# Patient Record
Sex: MALE | Race: BLACK OR AFRICAN AMERICAN | HISPANIC OR LATINO | Employment: UNEMPLOYED | URBAN - METROPOLITAN AREA
[De-identification: names, ages, dates, MRNs, and addresses within clinical notes are randomized per-mention and may not be internally consistent; named-entity substitution may affect disease eponyms.]

---
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
## 2021-04-21 NOTE — ED NOTES
Pt asking for bedtime medication, placed in chart and ordered       Margie Adams, CATALINA  02/24/19 0055 No

## 2023-11-01 ENCOUNTER — HOSPITAL ENCOUNTER (EMERGENCY)
Facility: HOSPITAL | Age: 23
End: 2023-11-03
Attending: EMERGENCY MEDICINE
Payer: COMMERCIAL

## 2023-11-01 DIAGNOSIS — F29 PSYCHOSIS (HCC): ICD-10-CM

## 2023-11-01 DIAGNOSIS — F12.10 CANNABIS ABUSE: Primary | ICD-10-CM

## 2023-11-01 LAB
AMPHETAMINES SERPL QL SCN: NEGATIVE
BARBITURATES UR QL: NEGATIVE
BENZODIAZ UR QL: NEGATIVE
COCAINE UR QL: NEGATIVE
ETHANOL EXG-MCNC: 0 MG/DL
METHADONE UR QL: NEGATIVE
OPIATES UR QL SCN: NEGATIVE
OXYCODONE+OXYMORPHONE UR QL SCN: NEGATIVE
PCP UR QL: NEGATIVE
THC UR QL: POSITIVE

## 2023-11-01 PROCEDURE — 80307 DRUG TEST PRSMV CHEM ANLYZR: CPT | Performed by: PHYSICIAN ASSISTANT

## 2023-11-01 PROCEDURE — 99285 EMERGENCY DEPT VISIT HI MDM: CPT | Performed by: PHYSICIAN ASSISTANT

## 2023-11-01 PROCEDURE — 82075 ASSAY OF BREATH ETHANOL: CPT | Performed by: PHYSICIAN ASSISTANT

## 2023-11-01 PROCEDURE — 99285 EMERGENCY DEPT VISIT HI MDM: CPT

## 2023-11-01 PROCEDURE — 96372 THER/PROPH/DIAG INJ SC/IM: CPT

## 2023-11-01 RX ORDER — BENZTROPINE MESYLATE 1 MG/ML
2 INJECTION INTRAMUSCULAR; INTRAVENOUS ONCE
Status: COMPLETED | OUTPATIENT
Start: 2023-11-01 | End: 2023-11-01

## 2023-11-01 RX ORDER — LORAZEPAM 2 MG/ML
2 INJECTION INTRAMUSCULAR ONCE
Status: DISCONTINUED | OUTPATIENT
Start: 2023-11-01 | End: 2023-11-03 | Stop reason: HOSPADM

## 2023-11-01 RX ORDER — DIPHENHYDRAMINE HYDROCHLORIDE 50 MG/ML
50 INJECTION INTRAMUSCULAR; INTRAVENOUS ONCE
Status: COMPLETED | OUTPATIENT
Start: 2023-11-01 | End: 2023-11-01

## 2023-11-01 RX ORDER — WATER 10 ML/10ML
INJECTION INTRAMUSCULAR; INTRAVENOUS; SUBCUTANEOUS
Status: COMPLETED
Start: 2023-11-01 | End: 2023-11-01

## 2023-11-01 RX ORDER — ZIPRASIDONE MESYLATE 20 MG/ML
20 INJECTION, POWDER, LYOPHILIZED, FOR SOLUTION INTRAMUSCULAR ONCE
Status: COMPLETED | OUTPATIENT
Start: 2023-11-01 | End: 2023-11-01

## 2023-11-01 RX ORDER — HALOPERIDOL 5 MG/ML
5 INJECTION INTRAMUSCULAR ONCE
Status: DISCONTINUED | OUTPATIENT
Start: 2023-11-01 | End: 2023-11-01

## 2023-11-01 RX ORDER — LORAZEPAM 2 MG/ML
2 INJECTION INTRAMUSCULAR ONCE
Status: COMPLETED | OUTPATIENT
Start: 2023-11-01 | End: 2023-11-01

## 2023-11-01 RX ORDER — LORAZEPAM 2 MG/ML
2 INJECTION INTRAMUSCULAR EVERY 4 HOURS PRN
Status: DISCONTINUED | OUTPATIENT
Start: 2023-11-01 | End: 2023-11-03

## 2023-11-01 RX ORDER — LORAZEPAM 2 MG/ML
2 INJECTION INTRAMUSCULAR ONCE
Status: DISCONTINUED | OUTPATIENT
Start: 2023-11-01 | End: 2023-11-01

## 2023-11-01 RX ORDER — HALOPERIDOL 5 MG/ML
5 INJECTION INTRAMUSCULAR ONCE
Status: COMPLETED | OUTPATIENT
Start: 2023-11-01 | End: 2023-11-01

## 2023-11-01 RX ORDER — DIPHENHYDRAMINE HYDROCHLORIDE 50 MG/ML
50 INJECTION INTRAMUSCULAR; INTRAVENOUS ONCE
Status: DISCONTINUED | OUTPATIENT
Start: 2023-11-01 | End: 2023-11-01

## 2023-11-01 RX ADMIN — BENZTROPINE MESYLATE 2 MG: 1 INJECTION INTRAMUSCULAR; INTRAVENOUS at 23:05

## 2023-11-01 RX ADMIN — HALOPERIDOL LACTATE 5 MG: 5 INJECTION, SOLUTION INTRAMUSCULAR at 17:04

## 2023-11-01 RX ADMIN — ZIPRASIDONE MESYLATE 20 MG: 20 INJECTION, POWDER, LYOPHILIZED, FOR SOLUTION INTRAMUSCULAR at 23:05

## 2023-11-01 RX ADMIN — WATER 1.2 ML: 1 INJECTION INTRAMUSCULAR; INTRAVENOUS; SUBCUTANEOUS at 23:05

## 2023-11-01 RX ADMIN — LORAZEPAM 2 MG: 2 INJECTION INTRAMUSCULAR; INTRAVENOUS at 17:04

## 2023-11-01 RX ADMIN — WATER 1.2 ML: 1 INJECTION INTRAMUSCULAR; INTRAVENOUS; SUBCUTANEOUS at 21:06

## 2023-11-01 RX ADMIN — DIPHENHYDRAMINE HYDROCHLORIDE 50 MG: 50 INJECTION, SOLUTION INTRAMUSCULAR; INTRAVENOUS at 17:03

## 2023-11-01 RX ADMIN — ZIPRASIDONE MESYLATE 20 MG: 20 INJECTION, POWDER, LYOPHILIZED, FOR SOLUTION INTRAMUSCULAR at 21:06

## 2023-11-01 NOTE — ED NOTES
Pt moved to the [facility] area, pt stripped and placed in scrubs w/ assistance from police and Maci Campbell  11/01/23 6366
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
## 2023-11-01 NOTE — ED NOTES
Unable to obtain vitals at this time due to patient being unsafe to approach.       Buddy Smith RN  11/01/23 3561

## 2023-11-01 NOTE — LETTER
[address] [facility] [address]  Dept: [phone]      [id number] TRANSFER CONSENT    NAME Latanya Wagoner                    2000                              MRN [id number]    I have been informed of my rights regarding examination, treatment, and transfer   by Dr. Nereida Porter DO    Benefits: Specialized equipment and/or services available at the receiving facility (Include comment)________________________    Risks: Potential for delay in receiving treatment      Consent for Transfer:  I acknowledge that my medical condition has been evaluated and explained to me by the emergency department physician or other qualified medical person and/or my attending physician, who has recommended that I be transferred to the service of  Accepting Physician: Dr. Gini Murillo at [address] Name, [address] [facility] [address] : [address] [facility], [facility]. The above potential benefits of such transfer, the potential risks associated with such transfer, and the probable risks of not being transferred have been explained to me, and I fully understand them. The doctor has explained that, in my case, the benefits of transfer outweigh the risks. I agree to be transferred. I authorize the performance of emergency medical procedures and treatments upon me in both transit and upon arrival at the receiving facility. Additionally, I authorize the release of any and all medical records to the receiving facility and request they be transported with me, if possible. I understand that the safest mode of transportation during a medical emergency is an ambulance and that the Hospital advocates the use of this mode of transport. Risks of traveling to the receiving facility by car, including absence of medical control, life sustaining equipment, such as oxygen, and medical personnel has been explained to me and I fully understand them.     ([address] [facility] CORRECT BOX BELOW)  [X]  I consent to the stated transfer and to be transported by ambulance/helicopter. [  ]  I consent to the stated transfer, but refuse transportation by ambulance and accept full responsibility for my transportation by car. I understand the risks of non-ambulance transfers and I exonerate the Hospital and its staff from any deterioration in my condition that results from this refusal.    X___________________________________________    DATE  23  TIME________  Signature of patient or legally responsible individual signing on patient behalf           RELATIONSHIP TO PATIENT_________________________                  Provider Certification    NAME All Harper                                 2000                              MRN [id number]    A medical screening exam was performed on the above named patient. Based on the examination:    Condition Necessitating Transfer The primary encounter diagnosis was Cannabis abuse. A diagnosis of Psychosis ([address] [facility]) was also pertinent to this visit.     Patient Condition: The patient has been stabilized such that within reasonable medical probability, no material deterioration of the patient condition or the condition of the unborn child(harshad) is likely to result from the transfer    Reason for Transfer: Level of Care needed not available at this facility    Transfer Requirements: [address] [facility], [address] [facility] [address]   Space available and qualified personnel available for treatment as acknowledged by [place], [address] [facility], [phone]  Agreed to accept transfer and to provide appropriate medical treatment as acknowledged by       Dr. Gilmar Hdz  Appropriate medical records of the examination and treatment of the patient are provided at the time of transfer   [address] [facility] [address] [facility] [address] __TS__  Transfer will be performed by qualified personnel from [facility]  and appropriate transfer equipment as required, including the use of necessary and appropriate life support measures. Provider Certification: I have examined the patient and explained the following risks and benefits of being transferred/refusing transfer to the patient/family:         Based on these reasonable risks and benefits to the patient and/or the unborn child(harshad), and based upon the information available at the time of the patient’s examination, I certify that the medical benefits reasonably to be expected from the provision of appropriate medical treatments at another medical facility outweigh the increasing risks, if any, to the individual’s medical condition, and in the case of labor to the unborn child, from effecting the transfer.     X____________________________________________ DATE 11/03/23        TIME_______      ORIGINAL - SEND TO MEDICAL RECORDS   COPY - SEND WITH PATIENT DURING TRANSFER

## 2023-11-01 NOTE — ED NOTES
Pt uncooperative with vital signs, breathalyzer, triage questions     Elio Messina RN  11/01/23 [phone]

## 2023-11-01 NOTE — ED NOTES
Pt sitting up in restraints, pt speaking but not providing any comprehensible information.       Ru Eckert RN  11/01/23 4234

## 2023-11-01 NOTE — ED NOTES
Pt urinated on himself. Bed changed. Skin cleansed. Security at bedside.  Scrubs changed     Óscar Ventura RN  11/01/23 6226

## 2023-11-02 PROCEDURE — 96374 THER/PROPH/DIAG INJ IV PUSH: CPT

## 2023-11-02 RX ORDER — HYDROXYZINE HYDROCHLORIDE 25 MG/1
25 TABLET, FILM COATED ORAL
Status: CANCELLED | OUTPATIENT
Start: 2023-11-02

## 2023-11-02 RX ORDER — HALOPERIDOL 5 MG/1
5 TABLET ORAL
Status: CANCELLED | OUTPATIENT
Start: 2023-11-02

## 2023-11-02 RX ORDER — LORAZEPAM 2 MG/ML
1 INJECTION INTRAMUSCULAR EVERY 4 HOURS PRN
Status: CANCELLED | OUTPATIENT
Start: 2023-11-02

## 2023-11-02 RX ORDER — ZIPRASIDONE MESYLATE 20 MG/ML
20 INJECTION, POWDER, LYOPHILIZED, FOR SOLUTION INTRAMUSCULAR EVERY 4 HOURS PRN
Status: DISCONTINUED | OUTPATIENT
Start: 2023-11-02 | End: 2023-11-03

## 2023-11-02 RX ORDER — WATER 10 ML/10ML
INJECTION INTRAMUSCULAR; INTRAVENOUS; SUBCUTANEOUS
Status: DISPENSED
Start: 2023-11-02 | End: 2023-11-03

## 2023-11-02 RX ORDER — LORAZEPAM 1 MG/1
1 TABLET ORAL EVERY 8 HOURS PRN
Status: CANCELLED | OUTPATIENT
Start: 2023-11-02

## 2023-11-02 RX ORDER — HALOPERIDOL 5 MG/ML
2.5 INJECTION INTRAMUSCULAR
Status: CANCELLED | OUTPATIENT
Start: 2023-11-02

## 2023-11-02 RX ORDER — POLYETHYLENE GLYCOL 3350 17 G/17G
17 POWDER, FOR SOLUTION ORAL DAILY PRN
Status: CANCELLED | OUTPATIENT
Start: 2023-11-02

## 2023-11-02 RX ORDER — HALOPERIDOL 5 MG/ML
5 INJECTION INTRAMUSCULAR
Status: CANCELLED | OUTPATIENT
Start: 2023-11-02

## 2023-11-02 RX ORDER — ACETAMINOPHEN 325 MG/1
650 TABLET ORAL EVERY 6 HOURS PRN
Status: CANCELLED | OUTPATIENT
Start: 2023-11-02

## 2023-11-02 RX ORDER — BENZTROPINE MESYLATE 1 MG/ML
0.5 INJECTION INTRAMUSCULAR; INTRAVENOUS
Status: CANCELLED | OUTPATIENT
Start: 2023-11-02

## 2023-11-02 RX ORDER — LORAZEPAM 2 MG/ML
1 INJECTION INTRAMUSCULAR
Status: CANCELLED | OUTPATIENT
Start: 2023-11-02

## 2023-11-02 RX ORDER — LORAZEPAM 2 MG/ML
2 INJECTION INTRAMUSCULAR EVERY 8 HOURS PRN
Status: CANCELLED | OUTPATIENT
Start: 2023-11-02

## 2023-11-02 RX ORDER — BENZTROPINE MESYLATE 1 MG/ML
1 INJECTION INTRAMUSCULAR; INTRAVENOUS
Status: CANCELLED | OUTPATIENT
Start: 2023-11-02

## 2023-11-02 RX ORDER — BENZTROPINE MESYLATE 1 MG/ML
2 INJECTION INTRAMUSCULAR; INTRAVENOUS ONCE
Status: DISCONTINUED | OUTPATIENT
Start: 2023-11-02 | End: 2023-11-03 | Stop reason: HOSPADM

## 2023-11-02 RX ORDER — LORAZEPAM 2 MG/ML
2 INJECTION INTRAMUSCULAR
Status: CANCELLED | OUTPATIENT
Start: 2023-11-02

## 2023-11-02 RX ORDER — TRAZODONE HYDROCHLORIDE 100 MG/1
100 TABLET ORAL
Status: CANCELLED | OUTPATIENT
Start: 2023-11-02

## 2023-11-02 RX ORDER — ACETAMINOPHEN 325 MG/1
650 TABLET ORAL EVERY 4 HOURS PRN
Status: CANCELLED | OUTPATIENT
Start: 2023-11-02

## 2023-11-02 RX ORDER — MAGNESIUM HYDROXIDE/ALUMINUM HYDROXICE/SIMETHICONE 120; 1200; 1200 MG/30ML; MG/30ML; MG/30ML
30 SUSPENSION ORAL EVERY 4 HOURS PRN
Status: CANCELLED | OUTPATIENT
Start: 2023-11-02

## 2023-11-02 RX ORDER — ACETAMINOPHEN 325 MG/1
975 TABLET ORAL EVERY 6 HOURS PRN
Status: CANCELLED | OUTPATIENT
Start: 2023-11-02

## 2023-11-02 RX ORDER — HALOPERIDOL 1 MG/1
2 TABLET ORAL
Status: CANCELLED | OUTPATIENT
Start: 2023-11-02

## 2023-11-02 RX ORDER — BENZTROPINE MESYLATE 1 MG/1
1 TABLET ORAL EVERY 6 HOURS PRN
Status: CANCELLED | OUTPATIENT
Start: 2023-11-02

## 2023-11-02 RX ORDER — HYDROXYZINE HYDROCHLORIDE 25 MG/1
50 TABLET, FILM COATED ORAL
Status: CANCELLED | OUTPATIENT
Start: 2023-11-02

## 2023-11-02 RX ADMIN — LORAZEPAM 2 MG: 2 INJECTION INTRAMUSCULAR; INTRAVENOUS at 00:10
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
## 2023-11-02 NOTE — ED NOTES
CW received tc from pt's mother inquiring on update of pt. CW advised pt's mother, Vaibhav Culver, pt has not given this writer verbal consent to share details. CW did advise Vaibhav Culver, as Vaibhav Culver is the petitioner of the [phone], pt will be hospitalized. CW advised Huy Noble would advise pt, Vaibhav Culver called and if pt is interested in contacting her, a phone would be provided. CW confirmed w/Ele[employer], pt does not have active health insurance at this time. Vaibhav Culver, pt's mother, can be reached at: [phone]    The phone number on demographic sheet is an old number.     TDS, CW
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
## 2023-11-02 NOTE — ED NOTES
CW observes pt speaking w/another pt in secure holding area. Pt appears to be conversing and engaging appropriately.     TDS, CW
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
## 2023-11-02 NOTE — ED NOTES
Pt resting comfortably at this time, will continue to monitor.       Charisse Aaron RN  11/02/23 2003

## 2023-11-02 NOTE — RESTRAINT FACE TO FACE
Restraint Face to Face   Lyssa Oconnor 25 y.o. male MRN: [id number]  Unit/Bed#: [facility] 02 Encounter: [id number]      Physical Evaluation  Purpose for Restraints/ Seclusion High risk for self harm, High risk for causing significant disruption of treatment environment , High risk for harm to others, and high risk for flight  Patient's reaction to the intervention is as expected  Patient's medical condition stable  Patient's Behavioral conditionstable  Restraints to be Continued

## 2023-11-02 NOTE — ED NOTES
Medication Ativan 2mg and Geodon wasted with Cristhian Chao RN.       Patricia Meyers RN  11/02/23 7908

## 2023-11-02 NOTE — ED NOTES
Pt is observed making verbal threats to staff. Pt is refusing to obtain vitals.       Shabana Gibson RN  11/01/23 8238

## 2023-11-02 NOTE — ED NOTES
Left arm, right leg restraints removed with security at bedside. Primary RN notified   Pt allowed EDT ML to obtain vitals.  pt behavior calm and cooperative, security at bedside   Pt currently eating sandwich calm and cooperative      Viktoriya Carrera, [address] [place] [address]  11/02/23 0251

## 2023-11-03 ENCOUNTER — HOSPITAL ENCOUNTER (INPATIENT)
Facility: HOSPITAL | Age: 23
LOS: 25 days | Discharge: HOME | DRG: 753 | End: 2023-11-28
Attending: PSYCHIATRY & NEUROLOGY | Admitting: PSYCHIATRY & NEUROLOGY
Payer: COMMERCIAL

## 2023-11-03 VITALS
HEART RATE: 91 BPM | OXYGEN SATURATION: 100 % | TEMPERATURE: 98.3 F | RESPIRATION RATE: 18 BRPM | SYSTOLIC BLOOD PRESSURE: 172 MMHG | DIASTOLIC BLOOD PRESSURE: 82 MMHG

## 2023-11-03 DIAGNOSIS — M54.50 LUMBAGO: ICD-10-CM

## 2023-11-03 DIAGNOSIS — E55.9 VITAMIN D DEFICIENCY: ICD-10-CM

## 2023-11-03 DIAGNOSIS — Z72.0 TOBACCO ABUSE: ICD-10-CM

## 2023-11-03 DIAGNOSIS — E53.8 VITAMIN B12 DEFICIENCY: ICD-10-CM

## 2023-11-03 DIAGNOSIS — K64.9 HEMORRHOIDS: ICD-10-CM

## 2023-11-03 DIAGNOSIS — F39 MOOD DISORDER (HCC): Primary | ICD-10-CM

## 2023-11-03 DIAGNOSIS — F84.0 AUTISM SPECTRUM DISORDER: Chronic | ICD-10-CM

## 2023-11-03 DIAGNOSIS — F12.10 CANNABIS ABUSE: ICD-10-CM

## 2023-11-03 PROCEDURE — 99245 OFF/OP CONSLTJ NEW/EST HI 55: CPT | Performed by: PSYCHIATRY & NEUROLOGY

## 2023-11-03 PROCEDURE — 99223 1ST HOSP IP/OBS HIGH 75: CPT | Performed by: PSYCHIATRY & NEUROLOGY

## 2023-11-03 RX ORDER — BENZTROPINE MESYLATE 1 MG/1
1 TABLET ORAL EVERY 6 HOURS PRN
Status: DISCONTINUED | OUTPATIENT
Start: 2023-11-03 | End: 2023-11-03 | Stop reason: HOSPADM

## 2023-11-03 RX ORDER — HYDROXYZINE HYDROCHLORIDE 25 MG/1
25 TABLET, FILM COATED ORAL
Status: DISCONTINUED | OUTPATIENT
Start: 2023-11-03 | End: 2023-11-28 | Stop reason: HOSPADM

## 2023-11-03 RX ORDER — HALOPERIDOL 5 MG/ML
2.5 INJECTION INTRAMUSCULAR
Status: DISCONTINUED | OUTPATIENT
Start: 2023-11-03 | End: 2023-11-28 | Stop reason: HOSPADM

## 2023-11-03 RX ORDER — HYDROXYZINE 50 MG/1
50 TABLET, FILM COATED ORAL
Status: DISCONTINUED | OUTPATIENT
Start: 2023-11-03 | End: 2023-11-28 | Stop reason: HOSPADM

## 2023-11-03 RX ORDER — MAGNESIUM HYDROXIDE/ALUMINUM HYDROXICE/SIMETHICONE 120; 1200; 1200 MG/30ML; MG/30ML; MG/30ML
30 SUSPENSION ORAL EVERY 4 HOURS PRN
Status: DISCONTINUED | OUTPATIENT
Start: 2023-11-03 | End: 2023-11-28 | Stop reason: HOSPADM

## 2023-11-03 RX ORDER — CHLORPROMAZINE HYDROCHLORIDE 25 MG/ML
50 INJECTION INTRAMUSCULAR EVERY 6 HOURS PRN
Status: DISCONTINUED | OUTPATIENT
Start: 2023-11-03 | End: 2023-11-03 | Stop reason: HOSPADM

## 2023-11-03 RX ORDER — HALOPERIDOL 5 MG/1
5 TABLET ORAL
Status: DISCONTINUED | OUTPATIENT
Start: 2023-11-03 | End: 2023-11-28 | Stop reason: HOSPADM

## 2023-11-03 RX ORDER — OLANZAPINE 20 MG/1
20 TABLET ORAL DAILY
COMMUNITY
End: 2023-11-28

## 2023-11-03 RX ORDER — CHLORPROMAZINE HYDROCHLORIDE 25 MG/1
50 TABLET, FILM COATED ORAL EVERY 6 HOURS PRN
Status: DISCONTINUED | OUTPATIENT
Start: 2023-11-03 | End: 2023-11-03 | Stop reason: HOSPADM

## 2023-11-03 RX ORDER — LORAZEPAM 1 MG/1
1 TABLET ORAL EVERY 8 HOURS PRN
Status: DISCONTINUED | OUTPATIENT
Start: 2023-11-03 | End: 2023-11-28 | Stop reason: HOSPADM

## 2023-11-03 RX ORDER — LORAZEPAM 2 MG/ML
2 INJECTION INTRAMUSCULAR EVERY 8 HOURS PRN
Status: DISCONTINUED | OUTPATIENT
Start: 2023-11-03 | End: 2023-11-28 | Stop reason: HOSPADM

## 2023-11-03 RX ORDER — LORAZEPAM 2 MG/ML
2 INJECTION INTRAMUSCULAR EVERY 6 HOURS PRN
Status: DISCONTINUED | OUTPATIENT
Start: 2023-11-03 | End: 2023-11-03 | Stop reason: HOSPADM

## 2023-11-03 RX ORDER — OLANZAPINE 5 MG/1
5 TABLET ORAL
COMMUNITY
End: 2023-11-28

## 2023-11-03 RX ORDER — BENZTROPINE MESYLATE 1 MG/1
1 TABLET ORAL EVERY 6 HOURS PRN
Status: DISCONTINUED | OUTPATIENT
Start: 2023-11-03 | End: 2023-11-28 | Stop reason: HOSPADM

## 2023-11-03 RX ORDER — HALOPERIDOL 5 MG/ML
5 INJECTION INTRAMUSCULAR
Status: DISCONTINUED | OUTPATIENT
Start: 2023-11-03 | End: 2023-11-28 | Stop reason: HOSPADM

## 2023-11-03 RX ORDER — LORAZEPAM 2 MG/ML
1 INJECTION INTRAMUSCULAR EVERY 4 HOURS PRN
Status: DISCONTINUED | OUTPATIENT
Start: 2023-11-03 | End: 2023-11-28 | Stop reason: HOSPADM

## 2023-11-03 RX ORDER — BENZTROPINE MESYLATE 1 MG/ML
1 INJECTION INTRAMUSCULAR; INTRAVENOUS EVERY 6 HOURS PRN
Status: DISCONTINUED | OUTPATIENT
Start: 2023-11-03 | End: 2023-11-03 | Stop reason: HOSPADM

## 2023-11-03 RX ORDER — LORAZEPAM 2 MG/ML
2 INJECTION INTRAMUSCULAR
Status: DISCONTINUED | OUTPATIENT
Start: 2023-11-03 | End: 2023-11-28 | Stop reason: HOSPADM

## 2023-11-03 RX ORDER — BENZTROPINE MESYLATE 1 MG/ML
1 INJECTION INTRAMUSCULAR; INTRAVENOUS
Status: DISCONTINUED | OUTPATIENT
Start: 2023-11-03 | End: 2023-11-28 | Stop reason: HOSPADM

## 2023-11-03 RX ORDER — HALOPERIDOL 2 MG/1
2 TABLET ORAL
Status: DISCONTINUED | OUTPATIENT
Start: 2023-11-03 | End: 2023-11-28 | Stop reason: HOSPADM

## 2023-11-03 RX ORDER — LITHIUM CARBONATE 600 MG/1
1200 CAPSULE ORAL 3 TIMES DAILY
COMMUNITY
End: 2023-11-28

## 2023-11-03 RX ORDER — CHLORPROMAZINE HYDROCHLORIDE 25 MG/1
100 TABLET, FILM COATED ORAL EVERY 6 HOURS PRN
Status: DISCONTINUED | OUTPATIENT
Start: 2023-11-03 | End: 2023-11-03 | Stop reason: HOSPADM

## 2023-11-03 RX ORDER — LORAZEPAM 2 MG/ML
1 INJECTION INTRAMUSCULAR
Status: DISCONTINUED | OUTPATIENT
Start: 2023-11-03 | End: 2023-11-28 | Stop reason: HOSPADM

## 2023-11-03 RX ORDER — LORAZEPAM 1 MG/1
1 TABLET ORAL EVERY 6 HOURS PRN
Status: DISCONTINUED | OUTPATIENT
Start: 2023-11-03 | End: 2023-11-03 | Stop reason: HOSPADM

## 2023-11-03 RX ORDER — BENZTROPINE MESYLATE 1 MG/ML
0.5 INJECTION INTRAMUSCULAR; INTRAVENOUS
Status: DISCONTINUED | OUTPATIENT
Start: 2023-11-03 | End: 2023-11-28 | Stop reason: HOSPADM

## 2023-11-03 RX ORDER — TRAZODONE HYDROCHLORIDE 100 MG/1
100 TABLET ORAL
Status: DISCONTINUED | OUTPATIENT
Start: 2023-11-03 | End: 2023-11-09

## 2023-11-03 RX ORDER — ACETAMINOPHEN 325 MG/1
975 TABLET ORAL EVERY 6 HOURS PRN
Status: DISCONTINUED | OUTPATIENT
Start: 2023-11-03 | End: 2023-11-28 | Stop reason: HOSPADM

## 2023-11-03 RX ORDER — ACETAMINOPHEN 325 MG/1
650 TABLET ORAL EVERY 4 HOURS PRN
Status: DISCONTINUED | OUTPATIENT
Start: 2023-11-03 | End: 2023-11-28 | Stop reason: HOSPADM

## 2023-11-03 RX ORDER — ACETAMINOPHEN 325 MG/1
650 TABLET ORAL EVERY 6 HOURS PRN
Status: DISCONTINUED | OUTPATIENT
Start: 2023-11-03 | End: 2023-11-28 | Stop reason: HOSPADM

## 2023-11-03 RX ORDER — POLYETHYLENE GLYCOL 3350 17 G/17G
17 POWDER, FOR SOLUTION ORAL DAILY PRN
Status: DISCONTINUED | OUTPATIENT
Start: 2023-11-03 | End: 2023-11-28 | Stop reason: HOSPADM

## 2023-11-03 NOTE — CONSULTS
REQUIRED DOCUMENTATION:     1. This service was provided via Telemedicine. 2. Provider located at [facility]. 3. TeleMed provider: Beatris Friedlander Holter, DO.  4. Identify all parties in room with patient during tele consult:  Patient, case management/crisis  5. Patient was then informed that this was a Telemedicine visit and that the exam was being conducted confidentially over secure lines. My office door was closed. No one else was in the room. Patient acknowledged consent and understanding of privacy and security of the Telemedicine visit, and gave us permission to have the assistant stay in the room in order to assist with the history and to conduct the exam.  I informed the patient that I have reviewed their record in Epic and presented the opportunity for them to ask any questions regarding the visit today. The patient agreed to participate. Psychiatric Evaluation - Department of [address] 25 y.o. male MRN: [id number]  Unit/Bed#: [facility] 02 Encounter: [id number]    ASSESSMENT & PLAN     Suicide/Homicide Risk Assessment:  Risk of Harm to Self:  The following ratings are based on assessment at the time of the interview and review of records  Demographic risk factors include: lowest socioeconomic class, never , male, age: young adult (15-24)  Historical Risk Factors include: chronic psychiatric problems, chronic mood disorder, history of mood disorder, chronic psychotic symptoms, history of psychosis, history of cognitive impairment, drug use, substance use, history of substance use, history of violence, criminal behaviors  Recent Specific Risk Factors include: mental illness diagnosis, unstable mood, substance abuse, unemployed, agitation, aggression  Protective Factors: no current suicidal ideation, stable living environment  Weapons:  admits to presence of weapons .  The following steps have been taken to ensure weapons are properly secured: Case Manager will contact family to remove or secure weapons  Based on today's assessment, Tayla Leon presents the following risk of harm to self:  elevated secondary to psychotic state    Risk of Harm to Others: The following ratings are based on assessment at the time of the interview and review of records  Demographic risk factors include: lowest socioeconomic class, never , male, age: young adult (15-24)  Historical Risk Factors include: chronic psychiatric problems, chronic mood disorder, history of mood disorder, chronic psychotic symptoms, history of psychosis, history of cognitive impairment, drug use, substance use, history of substance use, history of violence, criminal behaviors  Recent Specific Risk Factors include: mental illness diagnosis, unstable mood, substance abuse, unemployed, agitation, aggression, homicidal ideation  Protective Factors: stable living environment  Weapons: admits to presence of weapons. The following steps have been taken to ensure weapons are properly secured:  will contact family to remove or secure weapons  Based on today's assessment, Tayla Leon presents the following risk of harm to others: elevated secondary to psychotic state    DSM-5 Diagnoses:   Psychosis, unspecified; consideration for bipolar affective disorder, current episode manic with psychotic features versus schizoaffective disorder, bipolar type versus less likely substance-induced psychosis  Autism spectrum disorder per record review. Cannabis use disorder    Treatment Recommendations/Precautions:  Continue medical management per primary team. Consider assess CK level, seeing as patient was previously restrained physically. Collaborate with collaterals for baseline assessment and disposition as necessary. Continue one-to-one observation to assure patient and peer safety in addition to possible elopement risk  Defer introduction of psychotropic medications per patient preference. Introduce p.r.n. medications for agitation/aggression; see below. Presently, patient adheres to criteria for inpatient behavioral health admission by means of involuntary 302 commitment. 303 pending. Patient is not able to leave [address] [place] [address], pending inpatient behavioral health placement. Discharge date per primary team.   Consultation appreciated. Psychiatry to follow as necessary. Please do not hesitate to call/contact our service with any additional comments/concerns. Please call/contact on-call Psychiatry via [address] including on-call psychiatric service via [address] [place] [address] ([phone]) with any comments/concerns if after hours/Fri/Sat/Sunday. Thank you! Medications Risks/Benefits:    Risks, benefits, and possible side effects of medications explained to Rodríguez} and he verbalizes understanding. At this time Ernesto Selby does not want to start medications. Physician Requesting Consult: Marnee Sandifer, DO  Reason for Consult / Principal Problem: psychosis    HISTORY OF PRESENT ILLNESS (HPI)     Shante Serra is a 25 y.o., overtly appearing , single male, possessing pertinent psychiatric history of bipolar affective disorder versus schizoaffective disorder bipolar type in addition to autism spectrum disorder (documented as Asperger's disorder) and previous oppositional defiant disorder, presenting to [address] [place] [address] emergency department accompanied by ambulance, subsequent to instances of agitation/aggression requiring physical and chemical restraint in addition to worsening signs/symptoms of psychosis including homicidal ideation that includes particular plan to "slash her throat", referring to his mother, in addition to plan pertaining to [facility] his mother with a hammer in the setting of medication noncompliance for approximately 1-2 months preceding present admission.  Per record review: Pt placed in locked 4 point restraints due to aggressive behavior towards staff. Pt verbalizing death threats and threats of violence towards staff. Pt witnessed punching the wall and air. Pt witnessed making gestures of guns with his hands and "shooting" the staff. Pt witnessed using his hands to break a person's neck. Security to bedside to restrain patient. Pt witnessed saying to PCA delivering patient's food "I will knock you the fuck out" advancing upon PCA w/ his fists clenched and arms poised to punch PCA. Consequentially, transfer to inpatient behavioral health was cancelled. Presently, adamantly denies suicidal/homicidal ideation in addition to thoughts of self-injury, denying any/all psychiatric complaints/concerns despite stating that he has a lot of "hatred", appearing increasingly angry/irritable, agitated, labile, reiterating that he "doesn't need to be in this hellhole." Additionally, patient denies any/all psychiatric complaints/concerns, appearing suspicious and paranoid throughout evaluation, minimizing incident preceding present admission, stating that he is "good smoking weed and hanging out at home", contending that if staff attempts to intervene psychiatrically, he will "rain hell storm down on everyone."     PSYCHIATRIC REVIEW OF SYSTEMS     Appetite: no  Adverse eating: no  Weight changes: no  Insomnia/sleeplessness: no  Fatigue/anergy: no  Anhedonia/lack of interest: no  Attention/concentration: no  Psychomotor agitation/retardation: no  Somatic symptoms: no  Anxiety/panic attack: no  Mikki/hypomania: no  Hopelessness/helplessness/worthlessness: no  Self-injurious behavior/high-risk behavior: yes  Suicidal ideation: no  Homicidal ideation: yes, towards "everyone"  Auditory hallucinations: no  Visual hallucinations: no  Other perceptual disturbances: no  Delusional thinking: no  Obsessive/compulsive symptoms: no    REVIEW OF SYSTEMS   Pertinent items are noted in HPI.     HISTORICAL INFORMATION     Past Psychiatric History:   Past Psychiatric management: admits to previous inpatient  behavioral health admission approximately 3-4 years ago in [place], [place], denying present outpatient psychiatric provider  Past Suicide attempts/self-injurious behavior: denies  Past Violent behavior: yes, see above  Past Psychiatric medications: yes, although is unwilling to elaborate    Substance Abuse History:  I spent time with patient in counseling and education on risk of substance abuse. Assessed him motivation and encouraged patient for treatment. Brief intervention done. Social History       Tobacco History       Smoking Status  Never      Smokeless Tobacco Use  Never              Alcohol History       Alcohol Use Status  Never Comment  pt reports being a social drinker              Drug Use       Drug Use Status  Yes Types  Marijuana Comment  Pt reports daily use              Sexual Activity       Sexually Active  Not Asked              Activities of Daily Living    Not Asked                   I have assessed this patient for substance use within the past 12 months    Family Psychiatric History:   Denies    Social History:  Education: 12th grade  Learning Disabilities:  previous diagnosis of autism spectrum disorder  Marital history: single  Living arrangement, social support:  resides with two "friends". Occupational History: unemployed  Functioning Relationships: good support system.   Other Pertinent History: admits to previous charges pertaining to domestic violence, denying probation/parole, alluding to access to     Traumatic History:   Abuse: Denies  Other Traumatic Events: Denies    OBJECTIVE     Past Medical History:   Diagnosis Date    ADHD     Anxiety     Asperger's disorder     Depression     Oppositional defiant disorder     Schizoaffective disorder ([address])      Meds/Allergies   all current active meds have been reviewed, current meds:   Current Facility-Administered Medications   Medication Dose Route Frequency    benztropine (COGENTIN) injection 1 mg  1 mg Intramuscular Q6H PRN    benztropine (COGENTIN) injection 2 mg  2 mg Intramuscular Once    benztropine (COGENTIN) tablet 1 mg  1 mg Oral Q6H PRN    chlorproMAZINE (THORAZINE) tablet 100 mg  100 mg Oral Q6H PRN    Or    chlorproMAZINE (THORAZINE) injection SOLN 50 mg  50 mg Intramuscular Q6H PRN    chlorproMAZINE (THORAZINE) tablet 50 mg  50 mg Oral Q6H PRN    LORazepam (ATIVAN) injection 2 mg  2 mg Intravenous Once    LORazepam (ATIVAN) injection 2 mg  2 mg Intramuscular Q6H PRN    LORazepam (ATIVAN) tablet 1 mg  1 mg Oral Q6H PRN   , and PTA meds:   Prior to Admission Medications   Prescriptions Last Dose Informant Patient Reported? Taking? Benzoyl Peroxide 10 % LIQD   Yes No   Sig: Wash affected areas   amphetamine-dextroamphetamine (ADDERALL XR) 20 MG 24 hr capsule   Yes No   Sig: Take 20 mg by mouth   mirtazapine (REMERON) 15 mg tablet   Yes No   Sig: Take 20 mg by mouth daily at bedtime      Facility-Administered Medications: None     No Known Allergies    Vital signs in last 24 hours:  HR:  [91] 91  BP: (172)/(82) 172/82  No intake or output data in the 24 hours ending 11/03/23 1123    Screenings:  Nutrition Screening: Nutrition Assessment (completed by Staff): Nutrition  Appetite: Poor (Pt reports appetite varies)    Pain Screening: Pain Screening:      Suicide Screening: ED Crisis Suicide Risk Assessment: Suicide Risk Assessment  Violence Risk to Self: Denies ideation within past 6 months  Protective Factors: The patient has no thoughts of suicide      Laboratory results:  I have personally reviewed all pertinent laboratory/tests results.   Most Recent Labs:   Lab Results   Component Value Date    HGBA1C 5.1 07/28/2022     07/28/2022     Labs in last 72 hours: No results for input(s): "WBC", "RBC", "HGB", "HCT", "PLT", "RDW", "TOTANEUTABS", "NEUTROABS", "SODIUM", "K", "CL", "CO2", "BUN", "CREATININE", "GLUC", "GLUF", "CALCIUM", "AST", "ALT", "ALKPHOS", "TP", "ALB", "TBILI", "CHOLESTEROL", "HDL", "TRIG", "[facility]", "VALPROICTOT", "CARBAMAZEPIN", "LITHIUM", "AMMONIA", "[id number]", "Vivek Maury", "T3FREE", "PREGTESTUR", "PREGSERUM", "HCG", "HCGQUANT", "RPR" in the last 72 hours.   Admission Labs:   Admission on 11/01/2023   Component Date Value    Amph/Meth UR 11/01/2023 Negative     Barbiturate Ur 11/01/2023 Negative     Benzodiazepine Urine 11/01/2023 Negative     Cocaine Urine 11/01/2023 Negative     Methadone Urine 11/01/2023 Negative     Opiate Urine 11/01/2023 Negative     PCP Ur 11/01/2023 Negative     THC Urine 11/01/2023 Positive (A)     Oxycodone Urine 11/01/2023 Negative     EXTBreath Alcohol 11/01/2023 0.00        Imaging Studies: see pertinent studies if applicable    EKG, Pathology, and Other Studies: see pertinent studies if applicable    Mental Status Evaluation:  Appearance:  age appropriate, casually dressed, disheveled, and possessing marginal grooming/hygiene in hospital attire   Behavior:  psychomotor agitation and restless and fidgety, argumentative, agitated possessing poor eye contact, suspicious, superficial   Speech:  loud and profane   Mood:  Dysphoric; "Get me the fuck out of this hellhole."    Affect:  increased in intensity, labile, and mood-congruent   Language: naming objects and repeating phrases   Thought Process:  tangential   Thought Content:  No overt obsessions or delusions although appears paranoid   Perceptual Disturbances: None although appears internally preoccupied and distracted at times   Risk Potential: Suicidal Ideations none, Homicidal Ideations with plan as above, and Potential for Aggression Yes previous instances of agitation/aggression   Sensorium:  person, place, month of year, and year   Cognition:  recent and remote memory grossly intact   Consciousness:  alert and awake    Attention: attention span appeared shorter than expected for age   Intellect: not examined   Fund of Knowledge: vocabulary: appears appropriate   Insight:  poor   Judgment: poor Muscle Strength and Tone: Not assessed   Gait/Station: Appears appropriate   Motor Activity: no abnormal movements     Memory: Short and long term memory  fair     Code Status: No Order    Advance Directive and Living Will:        Power of :      POLST:      Note Share: This note was not shared with the patient due to reasonable likelihood of causing patient harm    This note has been constructed using a voice recognition system. There may be translation, syntax,  or grammatical errors. If you have any questions, please contact the dictating provider. Georgia C. Holter, D.O.

## 2023-11-03 NOTE — ED NOTES
Explained to patient would like to trial taking 1 wrist & 1 ankle out of restraint. Patient verbalized understanding. Security at the bedside at this time to release right wrist & left ankle. At this time patient verbalizes understanding to this RN & security of expectations when released from restraints. Patient agreeable to trial period with expectations of being cooperative & non violent towards staff. Provider made aware for new restraint order.         Isael Persaud RN  11/03/23 5224

## 2023-11-03 NOTE — ED NOTES
CW received TT from [facility]    Pt transferring to [facility]   Under the care of Dr. Juan Alberto Grimaldo    Pt may arrive at 1400 or later    CW placed request in [facility],

## 2023-11-03 NOTE — ED NOTES
CW sent email and TT to psychiatrist requesting consult and advising of the need to schedule 303 hearing.      TDS, CW

## 2023-11-03 NOTE — PROGRESS NOTES
Clothing at Bedside:    [address] [facility] with [facility]  1 [place] Sock  1 Black Sock    Patient Belonging Room #20:    [place] Far er War Jacket  [place] Far er Head Lace Band Long  [place] with Silver Chain  Swamp Boots w/laces

## 2023-11-03 NOTE — ED NOTES
Explained to patient will have security remove remaining 2 restraints at this time. Patient agreeable & cooperative. Security aware. Charge RN also aware.       Александр Flores RN  11/03/23 0962

## 2023-11-03 NOTE — ED NOTES
CW received TT from [facility]    Pt has been accepted to [facility]  Pt may arrive anytime after 2200    CW placed transport request in [facility],

## 2023-11-03 NOTE — ED NOTES
CW provided pt w/updates and advised of transfer. Pt states, "I really don't care I don't want to be here anymore and I hope they shoot you." CW asked pt if pt feels anxious and needs any meds to relax.  Pt denied and stated, "Nope I don't want nothing and I really don't care anymore about anything."     TDS, CW

## 2023-11-03 NOTE — ED NOTES
CW received TT from [facility]    Pt will be picked up at [address] [place] [address]

## 2023-11-03 NOTE — ED NOTES
Patient ambulatory to bathroom at this time. Bed sheet changed. Patient given new pair of clean paper scrub pants. Patient cooperative with staff at this time.       Samanta Cline RN  11/03/23 4265

## 2023-11-03 NOTE — ED NOTES
Transport cancelled due to pt becoming aggressive and threatening, and is currently in 4-point locked restraints. Pt to be evaluated for in-pt Tx again tomorrow when out of restraints.     Armin Longoria, [address], CIS ll  11/02/23 22:01

## 2023-11-03 NOTE — H&P
Psychiatric Evaluation - Behavioral Health   Identification Data:Rodríguez Adamson 25 y.o. male MRN: [id number]  Unit/Bed#: U 222-01 Encounter: [id number]    Assessment/Plan   Principal Problem:    Mood disorder ([address]) r/o Bipolar D/o  Active Problems:    Autism spectrum disorder    Plan:   Start Abilify 5 mg daily for mood stabilization. Titrate as necessary. .   Admission labs. Frequent safety checks and vitals per unit protocol. Collaborate with family for baseline assessment and disposition planning. Case discussed with treatment team.  Treatment options and alternatives were reviewed with the patient.        --------------------------------------------------------------------------------------------------    Chief Complaint:  "I don't know why I'm here."    History of Present Illness     Nimesh Haney is a 25 y.o. male with a history of depression, bipolar disorder versus schizoaffective disorder, anxiety, ADHD, Autistic disorder, and ODD  who was admitted to the inpatient psychiatric unit on a involuntary 302 commitment basis due to unstable mood, increased agitation, aggressive behavior, and homicidal threats towards family members . Patient was BIB by police initially due to making homicidal threats of wanting to slash his mother's throat, was agitated and threatening staff in the ED, thus requiring physical restraints and IM medications. Symptoms prior to admission included homicidal ideation, increased irritability, anger outbursts, difficulty controlling anger, agitation, and noncompliance with medications. Onset of symptoms was gradual starting 1 month ago with gradually worsening course since that time. Stressors preceding admission included family conflict, family issues, financial problems, everyday stressors, difficulty with anger management, and noncompliance with treatment.  Estela Ying reports vague history of manic-like symptoms inlcuding goal directed behavior, decreased need for sleep, grandiosity, and increased rate of speech in the past, though unsure if occurred explicitly in the setting of substance use. Denies hx of perceptual disturbances such as auditory or visual hallucinations. On initial evaluation after admission to the inpatient psychiatric unit Katie Gunter was irritable, did not wish to engage with this writer, and refused to leave the group room in order to go through psychiatric evaluation. Patient requested that this writer sit next to him in the group room. During the course of interaction, patient initially stated that "everything is fine, none of the things that were said about me are true."  When talked about the comments he has made in the emergency department and per 302, he states that he felt angry with his family numbers, particularly his brother, who has been getting to conflict with him" triggering" him, to the point where he has gotten agitated and angry. He says that at that time he did make threats of wanting to cut his brother's throat, but not his mother's. In the emergency department, he states that he felt cornered, and states that he gets easily overwhelmed/overstimulated, and "lost it."  At this time, he denies having the same feelings as he did prior to admission, and regrets saying those things, though acknowledges that he has been getting increasingly into conflict with his family members and wanting to move out for some time, but not having the means to do so. Mood wise, he reports episodes of depression and anxiety, but says that he "takes care of it". He states that he has been in and out of different psychiatric hospitals since the age of 12, and notes having tried multiple medications without efficacy. He notes that he stopped taking lithium and Zyprexa several months ago as he notes that he felt "like a zombie" and feels like all the medications that he has tried stripped him of his "identity".   He says that he is interested in trying "something ", but says that he prefers to use marijuana as it is "natural "and helps calm him down. During the conversation, patient broke down crying on several occasions stating that he does not know who he is anymore after "going through all this."  He notes that he has difficulty with social interactions, particularly with social cues, as well as sensitivity to different sensory inputs, such as sounds and lights.    --------------------------------------------------------------------------------------------------  Per Psychiatrist Consultant Dr. Celine Yang Evaluation:  "Adrien Bennett is a 25 y.o., overtly appearing , single male, possessing pertinent psychiatric history of bipolar affective disorder versus schizoaffective disorder bipolar type in addition to autism spectrum disorder (documented as Asperger's disorder) and previous oppositional defiant disorder, presenting to [address] [place] [address] emergency department accompanied by ambulance, subsequent to instances of agitation/aggression requiring physical and chemical restraint in addition to worsening signs/symptoms of psychosis including homicidal ideation that includes particular plan to "slash her throat", referring to his mother, in addition to plan pertaining to [address] [place] [address] his mother with a hammer in the setting of medication noncompliance for approximately 1-2 months preceding present admission. Per record review: Pt placed in locked 4 point restraints due to aggressive behavior towards staff. Pt verbalizing death threats and threats of violence towards staff. Pt witnessed punching the wall and air. Pt witnessed making gestures of guns with his hands and "shooting" the staff. Pt witnessed using his hands to break a person's neck. Security to bedside to restrain patient.  Pt witnessed saying to PCA delivering patient's food "I will knock you the fuck out" advancing upon PCA w/ his fists clenched and arms poised to punch PCA. Consequentially, transfer to inpatient behavioral health was cancelled. Presently, adamantly denies suicidal/homicidal ideation in addition to thoughts of self-injury, denying any/all psychiatric complaints/concerns despite stating that he has a lot of "hatred", appearing increasingly angry/irritable, agitated, labile, reiterating that he "doesn't need to be in this hellhole." Additionally, patient denies any/all psychiatric complaints/concerns, appearing suspicious and paranoid throughout evaluation, minimizing incident preceding present admission, stating that he is "good smoking weed and hanging out at home", contending that if staff attempts to intervene psychiatrically, he will "rain hell storm down on everyone." "    Per Crisis Worker:  "Pt presents to the ED from home accompanied by EMS. 36 petitioned by pt's mother alleging, "Rodríguez's mom, Alisha Mccoy, provided that he threatened to slash her throat, shoot the family, threatened with a hammer, and leave the family for dead. She provided he is non-compliant with medication for 1-2 months. She provided he has schizoaffective disorder and bipolar 1. When I asked if he wanted to speak to a doctor, he re-enacted stabbing them and stomping them. Then he stated that's what he's going to do. In the hospital he wanted to kill a nurse." CW reviewed w/pt events which took place and 302. Pt denies all allegations. Pt denies any threats made to medical staff, self, and or family. Pt is calm, cooperative, and engages in conversation w/this writer. Pt reports not having any OP services and states, "I don't like therapists and don't need to talk to them. I smoke weed which gets my mind level and it's better for me it comes from the earth. I don't need pills or a doctor to tell me what I need." Pt maintains fair eye contact. Pt does report becoming agitated at home w/brother and mother.  Pt states, "I don't know why my mother said I would try to hit her, I never touched her or would I but my brother needs to get out of the house already. I can't stand him." Pt reports hx of ADHD, depression, schizoaffective disorder "and maybe a few others I have them all on my phone." Pt reports hx of IP tx in the past. Pt denies SI's / HI's and or AVH's. Pt does not appear to be responding to internal stimuli. Pt reports sleeping aprox 3-4 hrs per day and reports appetite which varies. Pt states, "I try all different types of diets so it depends how that goes." Pt denies any medical hx but states, "I think I have to get bloodwork done." Pt reports being a social drinker, no use of tobacco products, and unclear or able to provide clarity on legal issues. Pt does not feel IP is needed. Pt does not recollect or confirm any events which transpired even the need for restraints within the ED. Pt minimizes the events. CW went over 302 rights w/pt; however, pt appears to dismiss them and states, "I really don't think any of this is necessary. I'm not sure why my mother and brother said anything they said." CW reminded pt about pt's bx's in the ED and pt does not confirm these bx's took place.  Pt does not report any hx of abuse or trauma."    Psychiatric Review Of Systems:    Sleep changes: no  Appetite changes: decreased  Weight changes: no  Energy/anergy: decreased  Interest/pleasure/anhedonia: no  Somatic symptoms: no  Anxiety/panic: yes  Mikki: no  Guilty/hopeless: no  Self injurious behavior/risky behavior: no  Suicidal ideation: no  Homicidal ideation: no  Auditory hallucinations: no  Visual hallucinations: no  Other hallucinations: no  Delusional thinking: no  Eating disorder history: no  Obsessive/compulsive symptoms: no    Historical Information     Past Psychiatric History:     Past Inpatient Psychiatric Treatment:   Several past inpatient psychiatric admissions  Past Outpatient Psychiatric Treatment:    Noncompliant with outpatient psychiatric treatment prior to admission  Not in outpatient treatment recently  Does not remember the name of the psychiatrist  Past Suicide Attempts: no  Past Violent Behavior: yes  Past Psychiatric Medication Trials: Zoloft, Celexa, Lexapro, Depakote, Lithium, Seroquel, Zyprexa, Vraylar, Buspar, Adderall XR, Clonidine, and Tenex     Substance Abuse History:    Social History       Tobacco History       Smoking Status  Never      Smokeless Tobacco Use  Never              Alcohol History       Alcohol Use Status  Never Comment  pt reports being a social drinker              Drug Use       Drug Use Status  Yes Types  Marijuana Comment  Pt reports daily use              Sexual Activity       Sexually Active  Not Asked              Activities of Daily Living    Not Asked                 I have assessed this patient for substance use within the past 12 months    Alcohol use: denies use  Recreational drug use:   Cocaine:  denies use  Heroin:  denies use  Marijuana:  uses daily  Other drugs: reports "abusing" Adderall. Longest clean time: not applicable  History of Inpatient/Outpatient rehabilitation program: no  Smoking history: denies use  Use of caffeine: denies use    Family Psychiatric History:     Psychiatric Illness:   Mother - mental illness  Substance Abuse:  no family history of substance abuse  Suicide Attempts:  no family history of suicide attempts    Social History:    Education: high school diploma/GED  Learning Disabilities:  ASD  Marital History: single  Children: none  Living Arrangement: lives in home with mother and brother  Occupational History: unemployed  Functioning Relationships: limited support system  Legal History: unknown   History: None    Traumatic History:     Abuse:  denies  Other Traumatic Events: none     Past Medical History:    History of Seizures: no  History of Head injury with loss of consciousness: no    Past Medical History:   Diagnosis Date    ADHD     Anxiety     Asperger's disorder Depression     Oppositional defiant disorder     Schizoaffective disorder [facility])      Past Surgical History:   Procedure Laterality Date    APPENDECTOMY         Medical Review Of Systems:    A comprehensive review of systems was negative except for: Behavioral/Psych: positive for aggressive behavior, irritability, and mood swings    Allergies:    No Known Allergies    Medications: All current active medications have been reviewed. Medications prior to admission:    Prior to Admission Medications   Prescriptions Last Dose Informant Patient Reported? Taking?    Benzoyl Peroxide 10 % LIQD Not Taking Self Yes No   Sig: Wash affected areas   Patient not taking: Reported on 11/3/2023   OLANZapine (ZyPREXA) 20 MG tablet More than a month  Yes No   Sig: Take 20 mg by mouth daily Patient states stopped meds about month and half ago   OLANZapine (ZyPREXA) 5 mg tablet More than a month Self Yes No   Sig: Take 5 mg by mouth Patient states stopped meds about month and half ago   amphetamine-dextroamphetamine (ADDERALL XR) 20 MG 24 hr capsule Not Taking Self Yes No   Sig: Take 20 mg by mouth   Patient not taking: Reported on 11/3/2023   lithium 600 MG capsule More than a month Self Yes No   Sig: Take 1,200 mg by mouth Three times a day Patient states stopped med about month and half ago   mirtazapine (REMERON) 15 mg tablet Not Taking Self Yes No   Sig: Take 20 mg by mouth daily at bedtime   Patient not taking: Reported on 11/3/2023      Facility-Administered Medications: None       OBJECTIVE:    Vital signs in last 24 hours:    Temp:  [97.6 °F (36.4 °C)-98.4 °F (36.9 °C)] 98.4 °F (36.9 °C)  HR:  [60-77] 60  Resp:  [18] 18  BP: (145-153)/(88-92) 146/88      Intake/Output Summary (Last 24 hours) at 11/4/2023 1320  Last data filed at 11/3/2023 2030  Gross per 24 hour   Intake 240 ml   Output --   Net 240 ml        Mental Status Evaluation:    Appearance:  age appropriate, casually dressed, pink hair highlights   Behavior:  calm, guarded   Speech:  normal rate and volume   Mood:  "Fine"   Affect:  constricted, tearful   Language: naming objects   Thought Process:  goal directed   Associations: intact associations   Thought Content:  Some paranoid thoughts   Perceptual Disturbances: does not appear responding to internal stimuli, denies auditory or visual hallucinations when asked   Risk Potential: Suicidal ideation - None at present  Homicidal ideation - None at present  Potential for aggression - Yes, due to poor impulse control   Sensorium:  oriented to person, place, and time/date   Memory:  recent and remote memory grossly intact   Consciousness:  alert and awake   Attention/Concentration: attention span and concentration appear shorter than expected for age   Intellect: within normal limits   Fund of Knowledge: awareness of current events: yes   Insight:  limited   Judgment: limited   Muscle Strength Muscle Tone: normal  normal   Gait/Station: normal gait/station   Motor Activity: no abnormal movements       Laboratory Results: I have personally reviewed all pertinent laboratory/tests results    Most Recent Labs:   Lab Results   Component Value Date    HGBA1C 5.1 07/28/2022     07/28/2022       Imaging Studies: No results found. Code Status: Level 1 - Full Code  Advance Directive and Living Will: <no information>      Planned Treatment and Medication Changes:     All current active medications have been reviewed  Encourage group therapy, milieu therapy and occupational therapy  Behavioral Health checks every 15 minutes      Current Facility-Administered Medications   Medication Dose Route Frequency Provider Last Rate    acetaminophen  650 mg Oral Q6H PRN Cathren David Medei, CRNP      acetaminophen  650 mg Oral Q4H PRN Cathren David Medei, CRNP      acetaminophen  975 mg Oral Q6H PRN Cathren David Medei, CRNP      aluminum-magnesium hydroxide-simethicone  30 mL Oral Q4H PRN Cathren David Medei, CRNP      ARIPiprazole  5 mg Oral Daily Theresar MD Master      haloperidol lactate  2.5 mg Intramuscular Q6H PRN Max 4/day Quyen Maxcy Medei, CRNP      And    LORazepam  1 mg Intramuscular Q6H PRN Max 4/day Quyen Maxcy Medei, CRNP      And    benztropine  0.5 mg Intramuscular Q6H PRN Max 4/day Quyen Maxcy Medei, CRNP      haloperidol lactate  5 mg Intramuscular Q4H PRN Max 4/day Quyen Maxcy Medei, CRNP      And    LORazepam  2 mg Intramuscular Q4H PRN Max 4/day Quyen Maxcy Medei, CRNP      And    benztropine  1 mg Intramuscular Q4H PRN Max 4/day Quyen Maxcy Medei, CRNP      benztropine  1 mg Oral Q6H PRN Quyen Maxcy Medei, CRNP      haloperidol  2 mg Oral Q4H PRN Max 6/day Quyen Maxcy Medei, CRNP      haloperidol  5 mg Oral Q6H PRN Max 4/day Quyen Maxcy Medei, CRNP      haloperidol  5 mg Oral Q4H PRN Max 4/day Quyen Maxcy Medei, CRNP      hydrOXYzine HCL  25 mg Oral Q6H PRN Max 4/day Quyen Maxcy Medei, CRNP      hydrOXYzine HCL  50 mg Oral Q4H PRN Max 4/day Quyen Maxcy Medei, CRNP      Or    LORazepam  1 mg Intramuscular Q4H PRN Quyen Maxcy Medei, CRNP      LORazepam  1 mg Oral Q8H PRN Quyen Maxcy Medei, CRNP      Or    LORazepam  2 mg Intramuscular Q8H PRN Quyen Maxcy Medei, CRNP      polyethylene glycol  17 g Oral Daily PRN Quyen Maxcy Medei, CRNP      traZODone  100 mg Oral HS PRN Quyen Maxcy Medei, CRNP       Risks / Benefits of Treatment:    Risks, benefits, and possible side effects of medications explained to patient and patient verbalizes understanding and agreement for treatment. Counseling / Coordination of Care:    Patient's presentation on admission and proposed treatment plan discussed with treatment team.  Diagnosis, medication changes and treatment plan reviewed with patient.     Inpatient Psychiatric Certification:    Estimated length of stay: 10 midnights    Based upon physical, mental and social evaluations, I certify that inpatient psychiatric services are medically necessary for this patient for a duration of 10 midnights for the treatment of Mood disorder [facility])  Available alternative community resources do not meet the patient's mental health care needs. I further attest that an established written individualized plan of care has been implemented and is outlined in the patient's medical records.     Amparo Alcantar MD 11/04/23

## 2023-11-03 NOTE — ED NOTES
Pt said they had to urinate, tech went in with urinal so pt could urinate while in 4pt restraints. Pt called tech "mad patel" and said they wont urinate while in 4pt restraints.       Volney Halls  11/02/23 2138

## 2023-11-03 NOTE — NURSING NOTE
Patient admitted to unit from ED. When asked patient while he ws here he stated he did not know why denied everything stated no longer had feelings/thoughts to hurt others. ED reported patient threatened to kill mother and leave family for dead. Patient stated he stopped taking his meds about a month and half ago stated did not like how they made him feel so he "weaned" himself off them. Patient cooperative during admission and skin assessment performed with [facility] RN skin unremarkable. Patient states lives with family. Patient denied everything. Q 7 min behavioral and safety checks in place.

## 2023-11-03 NOTE — RESTRAINT FACE TO FACE
Restraint Face to Face   Darci Cooks 25 y.o. male MRN: [id number]  Unit/Bed#: SH 02 Encounter: [id number]      Physical Evaluation intermittent agitation, down to 2 points  Purpose for Restraints/ Seclusion High risk for causing significant disruption of treatment environment   Patient's reaction to the intervention: reluctance  Patient's medical condition guarded  Patient's Behavioral condition agitation   Restraints to be Continued

## 2023-11-04 PROBLEM — F39 MOOD DISORDER (HCC): Status: ACTIVE | Noted: 2023-11-04

## 2023-11-04 PROBLEM — F84.0 AUTISM SPECTRUM DISORDER: Chronic | Status: ACTIVE | Noted: 2023-11-04

## 2023-11-04 LAB
ATRIAL RATE: 59 BPM
P AXIS: 117 DEGREES
PR INTERVAL: 148 MS
QRS AXIS: 102 DEGREES
QRSD INTERVAL: 94 MS
QT INTERVAL: 432 MS
QTC INTERVAL: 427 MS
T WAVE AXIS: 127 DEGREES
VENTRICULAR RATE: 59 BPM

## 2023-11-04 PROCEDURE — 93005 ELECTROCARDIOGRAM TRACING: CPT

## 2023-11-04 PROCEDURE — 93010 ELECTROCARDIOGRAM REPORT: CPT | Performed by: INTERNAL MEDICINE

## 2023-11-04 RX ORDER — ARIPIPRAZOLE 5 MG/1
5 TABLET ORAL DAILY
Status: DISCONTINUED | OUTPATIENT
Start: 2023-11-04 | End: 2023-11-07

## 2023-11-04 NOTE — NURSING NOTE
Pt visible on unit. Interacting with select peers. Uninterested in assessment with RN at start of shift. Pt politely declined stating, "na I don't need to see the psychiatrist I'm good" Compliant with meals. Declined prn medications. Denies SI, HI, or hallucinations. Guarded. Underlying irritable edge. No acute behaviors observed this shift. Safety precautions maintained. Will continue to monitor and assess.

## 2023-11-04 NOTE — H&P
Jerrica Farias#  :2000 [address]  OXZ:[id number]    SAY:[id number]  Adm Date: 11/3/2023 1543  3:43 PM   ATT PHY: [facility]  [address] [address]         Chief Complaint:   Worsening depression and anxiety    History of Presenting Illness: Blondell Both is a(n) 25y.o. year old male was admitted through ED due to worsening depression and anxiety symptoms. Patient reportedly threatened to kill his mother and the family. Patient examined at bedside. Patient denied any active suicidal homicidal ideations. No Known Allergies    No current facility-administered medications on file prior to encounter.      Current Outpatient Medications on File Prior to Encounter   Medication Sig Dispense Refill    amphetamine-dextroamphetamine (ADDERALL XR) 20 MG 24 hr capsule Take 20 mg by mouth (Patient not taking: Reported on 11/3/2023)      Benzoyl Peroxide 10 % LIQD Wash affected areas (Patient not taking: Reported on 11/3/2023)      lithium 600 MG capsule Take 1,200 mg by mouth Three times a day Patient states stopped med about month and half ago      mirtazapine (REMERON) 15 mg tablet Take 20 mg by mouth daily at bedtime (Patient not taking: Reported on 11/3/2023)      OLANZapine (ZyPREXA) 20 MG tablet Take 20 mg by mouth daily Patient states stopped meds about month and half ago      OLANZapine (ZyPREXA) 5 mg tablet Take 5 mg by mouth Patient states stopped meds about month and half ago         Active Ambulatory Problems     Diagnosis Date Noted    No Active Ambulatory Problems     Resolved Ambulatory Problems     Diagnosis Date Noted    No Resolved Ambulatory Problems     Past Medical History:   Diagnosis Date    ADHD     Anxiety     Asperger's disorder     Depression     Oppositional defiant disorder     Schizoaffective disorder ([facility])        Past Surgical History:   Procedure Laterality Date    APPENDECTOMY         Social History:   Social History Socioeconomic History    Marital status: Single     Spouse name: None    Number of children: None    Years of education: None    Highest education level: None   Occupational History    None   Tobacco Use    Smoking status: Never    Smokeless tobacco: Never   Vaping Use    Vaping Use: Never used   Substance and Sexual Activity    Alcohol use: Never     Comment: pt reports being a social drinker    Drug use: Yes     Types: Marijuana     Comment: Pt reports daily use    Sexual activity: None   Other Topics Concern    None   Social History Narrative    None     Social Determinants of Health     Financial Resource Strain: Not on file   Food Insecurity: Not on file   Transportation Needs: Not on file   Physical Activity: Not on file   Stress: Not on file   Social Connections: Not on file   Intimate Partner Violence: Not on file   Housing Stability: Not on file       Family History:   Family History   Problem Relation Age of Onset    Bipolar disorder Mother        Review of Systems    Physical Exam   Vitals: Blood pressure 147/83, pulse 80, temperature 98.2 °F (36.8 °C), temperature source Temporal, resp. rate 18, height 5' 8" (1.727 m), weight 72.5 kg (159 lb 12.8 oz), SpO2 97 %. ,Body mass index is 24.3 kg/m². Constitutional: Awake and Alert. Well-developed and well-nourished. No distress. HENT: PERR,EOMI, conjunctiva normal  Head: Normocephalic and atraumatic. Mouth/Throat: Oropharynx is clear and moist.    Eyes: Conjunctivae and EOM are normal. Pupils are equal, round, and reactive to light. Right and left eye exhibits no discharge. Neck: Neck supple. No tracheal deviation present. No thyromegaly present. Cardiovascular: Normal rate, regular rhythm and normal heart sounds. Exam reveals no friction rub. No murmur heard. Pulmonary/Chest: Effort normal and breath sounds normal. No respiratory distress. She has no wheezes. Abdominal: Soft. Bowel sounds are normal. She exhibits no distension.  There is no tenderness. There is no rebound and no guarding. Neurological: Cranial Nerves grossly intact. No sensory deficit. Coordination normal.   Musculoskeletal:   Nontender spine  Skin: Skin is warm and dry. No rash noted. No diaphoresis. No erythema. No edema. No cyanosis. Assessment     Latanya Wagoner is a(n) 25y.o. year old male with MDD    1. Arthralgia/headache. Patient may get Tylenol as needed. 2.  Insomnia. Patient may get trazodone as needed. 3.  Psych with MDD. Patient is being managed by psych. Prognosis: Fair. Discharge Plan: In progress. Advanced Directives: I have discussed in detail the patient the advanced directives. Patient has not appointed anybody as his POA and has no living will with advanced healthcare directives. Patient's first contact is his mother Brody Galvez and her phone number is [phone]. When discussing cardiac and pulmonary resuscitation efforts with the patient, the patient wishes to be  FULL CODE. I have spent more than 50 minutes gathering data, doing physical examination, and discussing the advanced directives, which was witnessed by caring staff.

## 2023-11-04 NOTE — PROGRESS NOTES
This writer attempted to get the pt's labs this morning, pt refused. Will attempt at a later time. Nurse notified.

## 2023-11-04 NOTE — NURSING NOTE
Patient  was out in community, pt was calm , cooperative and pleasant upon approach. Pt does not have any scheduled medication, denies SI, HI and  visual or auditory hallucinations. Staff maintained Q 7 safety checks, administered medications as prescribed, and assisted as needed. We will continue to monitor , support and encourage as needed.

## 2023-11-05 PROBLEM — Z86.59 HISTORY OF OPPOSITIONAL DEFIANT DISORDER: Status: ACTIVE | Noted: 2023-11-05

## 2023-11-05 PROBLEM — F12.90 CONTINUOUS CANNABIS USE: Status: ACTIVE | Noted: 2023-11-05

## 2023-11-05 PROCEDURE — 93005 ELECTROCARDIOGRAM TRACING: CPT

## 2023-11-05 PROCEDURE — 99232 SBSQ HOSP IP/OBS MODERATE 35: CPT | Performed by: PSYCHIATRY & NEUROLOGY

## 2023-11-05 NOTE — NURSING NOTE
Patient observed intermittently social with peers this evening. On approach he is somewhat bright, but dismissive in assessment. Reports being unsure of events leading to admission, stating he does have a hx of schizoaffective disorder but he is not sure why he was admitted. Asked patient about hx of emotional lability and patient denies this, states he has no recollection of anger or agitation. Asked if he had any "triggers" that he could think of and he shook his head no. Denies hx of psychosis. Patient denies SI/HI. Denies depression and anxiety. Seen inappropriately smiling at times. No needs identified. Needed some redirection for inappropriate childlike behavior but easily redirected. Q7 minute monitoring ongoing.

## 2023-11-05 NOTE — PLAN OF CARE
Problem: Risk for Violence/Aggression Toward Others  Goal: Refrain from harming others  Outcome: Progressing  Goal: Control angry outbursts  Description: Interventions:  - Monitor patient closely, per order  - Ensure early verbal de-escalation  - Monitor prn medication needs  - Set reasonable/therapeutic limits, outline behavioral expectations, and consequences   - Provide a non-threatening milieu, utilizing the least restrictive interventions   Outcome: Progressing

## 2023-11-05 NOTE — PROGRESS NOTES
Progress Note - Yifan Carrillo 25 y.o. male MRN: [id number]    Unit/Bed#: [facility] 222-01 Encounter: [id number]        Subjective:   Patient seen and examined at bedside after reviewing the chart and discussing the case with the caring staff. Patient examined at bedside. Patient has no acute issues. Patient's labs including vitamin D and B12 levels are still pending. Physical Exam   Vitals: Blood pressure 140/96, pulse 64, temperature 97.5 °F (36.4 °C), temperature source Tympanic, resp. rate 18, height 5' 8" (1.727 m), weight 73.5 kg (162 lb), SpO2 100 %. ,Body mass index is 24.63 kg/m². Constitutional: He appears well-developed. HEENT: PERR, EOMI, MMM  Cardiovascular: Normal rate and regular rhythm. Pulmonary/Chest: Effort normal and breath sounds normal.   Abdomen: Soft, + BS, NT    Assessment/Plan:  Yifan Carrillo is a(n) 25y.o. year old male with MDD     1. Arthralgia/headache. Patient may get Tylenol as needed. 2.  Insomnia. Patient may get trazodone as needed.

## 2023-11-05 NOTE — PROGRESS NOTES
Progress Note - [address] 25 y.o. male MRN: [id number]  Unit/Bed#: [facility] 222-01 Encounter: [id number]    Assessment/Plan   Principal Problem:    Mood disorder ([address]) r/o Bipolar D/o  Active Problems:    Autism spectrum disorder    Continuous cannabis use    History of oppositional defiant disorder    Continue Abilify 5 mg for mood stabilization. Patient refused dose yesterday and that he will think about taking it. Recommend encouraging patient to take medications. Continue medications as noted below. Continue to promote patient participation in group therapy, milieu therapy, occupational therapy  Continue medical management by primary team.  Discharge disposition:  patient is under 302 admission status. Subjective: The patient was evaluated this morning for continuity of care and no acute distress noted throughout the evaluation. Over the past 24 hours staff noted that patient is visible in the milieu and interacting with other patients. Guarded, irritable and refusing nursing assessments, but no acute behavioral issues. Refused labwork in the morning. Refused medications, stating "I will not be taking that fucking shit I will take nothing they give me." Patient has been medication non-adherent. Today on evaluation, Sharon Garibay reports that overall he fells "fine." Notes that he has had depression and symptoms of anxiety. Reports that he has irritability when speaking with staff due to "they want to push things on me I don't want to do."  Reports that he did not take his Abilify yesterday due to "not wanting medications." Education provided to patient on importance of medication adherence and treatment goals and patient states that he will "think about it."  Offered other mood stabilizer options which the declined. He notes that he wants to pursue more "natural options like water and juice."    Notes that he slept poorly and felt tired in the morning. Reports improving appetite. Denies AVH. Reports feeling safe. Denies SI and HI. States that he wants to get out of the house and find another place.      Psychiatric Review of Systems:  Behavior over the last 24 hours:  unchanged  Sleep: difficulty falling asleep  Appetite: normal  Medication side effects:  n/a    ROS: no complaints, all other systems are negative    Current Medications:  Current Facility-Administered Medications   Medication Dose Route Frequency Provider Last Rate    acetaminophen  650 mg Oral Q6H PRN Charlyne Elders Medei, CRNP      acetaminophen  650 mg Oral Q4H PRN Charlyne Elders Medei, CRNP      acetaminophen  975 mg Oral Q6H PRN Charlyne Elders Medei, CRNP      aluminum-magnesium hydroxide-simethicone  30 mL Oral Q4H PRN Charlyne Elders Medei, CRNP      ARIPiprazole  5 mg Oral Daily Krystian Thao MD      haloperidol lactate  2.5 mg Intramuscular Q6H PRN Max 4/day Shahrzad Zeng M Medei, CRNP      And    LORazepam  1 mg Intramuscular Q6H PRN Max 4/day Charlyne Elders Medei, CRNP      And    benztropine  0.5 mg Intramuscular Q6H PRN Max 4/day Debora M Medei, CRNP      haloperidol lactate  5 mg Intramuscular Q4H PRN Max 4/day Charlyne Elders Medei, CRNP      And    LORazepam  2 mg Intramuscular Q4H PRN Max 4/day Charlyne Elders Medei, CRNP      And    benztropine  1 mg Intramuscular Q4H PRN Max 4/day Charlyne Elders Medei, CRNP      benztropine  1 mg Oral Q6H PRN Charlyne Elders Medei, CRNP      haloperidol  2 mg Oral Q4H PRN Max 6/day Charlyne Elders Medei, CRNP      haloperidol  5 mg Oral Q6H PRN Max 4/day Charlyne Elders Medei, CRNP      haloperidol  5 mg Oral Q4H PRN Max 4/day Charlyne Elders Medei, CRNP      hydrOXYzine HCL  25 mg Oral Q6H PRN Max 4/day Charlyne Elders Medei, CRNP      hydrOXYzine HCL  50 mg Oral Q4H PRN Max 4/day Charlyne Elders Medei, CRNP      Or    LORazepam  1 mg Intramuscular Q4H PRN [facility] Medei, CRNP      LORazepam  1 mg Oral Q8H PRN JAYA Anaya      Or    LORazepam  2 mg Intramuscular Q8H PRN JAYA Anaya      polyethylene glycol  17 g Oral Daily PRN JAYA Cleaning traZODone  100 mg Oral HS PRN [place] JAYA Oliveros         Behavioral Health Medications: all current active meds have been reviewed and continue current psychiatric medications. Vital signs in last 24 hours:  Temp:  [97.5 °F (36.4 °C)-98.2 °F (36.8 °C)] 97.5 °F (36.4 °C)  HR:  [64-80] 64  Resp:  [18] 18  BP: (140-147)/(83-96) 140/96    Laboratory results:  I have personally reviewed all pertinent laboratory/tests results. Most Recent Labs:   Lab Results   Component Value Date    HGBA1C 5.1 07/28/2022     07/28/2022         Mental Status Evaluation:    Appearance:  age appropriate, casually dressed, wearing shorts and pink shirt. Behavior:  cooperative, calm, guarded   Speech:  normal rate and volume   Mood:  "I'm feeling fine"   Affect:  Constricted, irritable   Thought Process:  goal directed   Associations: intact associations   Thought Content:  no overt delusions   Perceptual Disturbances: denies auditory or visual hallucinations when asked   Risk Potential: Suicidal ideation - None at present  Homicidal ideation - None at present  Potential for aggression - Yes, due to poor impulse control   Sensorium:  oriented to person, place, and time/date   Memory:  recent and remote memory grossly intact   Consciousness:  alert and awake   Attention/Concentration: attention span and concentration appear shorter than expected for age   Insight:  limited   Judgment: limited   Gait/Station: normal gait/station   Motor Activity: no abnormal movements     Progress Toward Goals: limited improvement. Pt refusing medications and has limited insight into needing medication for mood stabilization. Recommended Treatment:   See above for assessment and plan. Risks, benefits and possible side effects of Medications:   Risks, benefits, and possible side effects of medications explained to patient and patient verbalizes understanding.    Patient states that he will "think about" taking the medication but noted to nursing staff that he will not take any medications. Treatment discussed with nursing staff. This note has been constructed using a voice recognition system. There may be translation, syntax, or grammatical errors. If you have any questions, please contact the dictating provider.     Asad Metzger MD

## 2023-11-05 NOTE — NURSING NOTE
Patient observed having sporadic and poor sleep overnight. Non labored breathing observed during periods of rest. No acute behaviors observed. Will remain on safety precautions and continual monitoring.

## 2023-11-05 NOTE — NURSING NOTE
Pt observed playing card in dining room at start of shift. Social with peers. Patient uninterested in answering assessment questions. When attempting to provide medication education on Abilify scheduled in am pt adamantly states, "I will not be taking that fucking shit I will take nothing they give me" Refused any further discussion on topic. Easily agitates when discussing treatment or care. Calm and polite when asking for basic needs on unit. Safety precautions maintained. Will continue to monitor and assess.

## 2023-11-06 LAB
ATRIAL RATE: 69 BPM
P AXIS: 53 DEGREES
PR INTERVAL: 160 MS
QRS AXIS: 65 DEGREES
QRSD INTERVAL: 94 MS
QT INTERVAL: 402 MS
QTC INTERVAL: 430 MS
T WAVE AXIS: 36 DEGREES
VENTRICULAR RATE: 69 BPM

## 2023-11-06 PROCEDURE — 99232 SBSQ HOSP IP/OBS MODERATE 35: CPT | Performed by: HOSPITALIST

## 2023-11-06 PROCEDURE — 93010 ELECTROCARDIOGRAM REPORT: CPT | Performed by: INTERNAL MEDICINE

## 2023-11-06 RX ORDER — DIVALPROEX SODIUM 500 MG/1
500 TABLET, EXTENDED RELEASE ORAL DAILY
Status: DISCONTINUED | OUTPATIENT
Start: 2023-11-06 | End: 2023-11-06

## 2023-11-06 RX ORDER — DIVALPROEX SODIUM 500 MG/1
500 TABLET, EXTENDED RELEASE ORAL
Status: DISCONTINUED | OUTPATIENT
Start: 2023-11-06 | End: 2023-11-09

## 2023-11-06 RX ADMIN — ARIPIPRAZOLE 5 MG: 5 TABLET ORAL at 11:40

## 2023-11-06 NOTE — PROGRESS NOTES
11/06/23 1140   Team Meeting   Meeting Type Tx Team Meeting   Team Members Present   Team Members Present Physician;Nurse;   Physician Team Member Dr Linda Guillen MD   Nursing Team Member Leigh Lombardi[address] Work Team Member Abiola Avina [place]   Patient/Family Present   Patient Present Yes   Patient's Family Present No     Patient and treatment team met and reviewed pt strengths, limitations, coping skills, treatment plan and goals; pt agreeable and signed; copy on chart

## 2023-11-06 NOTE — TREATMENT PLAN
TREATMENT PLAN REVIEW - [address] 22 y.o. 2000 male MRN: [id number]    [address] [address] Room / Bed: Gaston Oseguera/[facility] 042-12 Encounter: [id number]          Admit Date/Time:  11/3/2023  3:43 PM    Treatment Team: Attending Provider: Scotty Ortiz MD; Consulting Physician: Ayla Almeida MD; Patient Care Assistant: Danielle Moyer; Registered Nurse: Anson Cline RN; Patient Care Assistant: Camille Dillon; Care Manager: Leyda Gregory RN; : Chidi Pa;  Patient Care Assistant: Ronny Osei    Diagnosis: Principal Problem:    Mood disorder ([address]) r/o Bipolar D/o  Active Problems:    Autism spectrum disorder    Continuous cannabis use    History of oppositional defiant disorder      Patient Strengths/Assets: average or above intelligence, communication skills, good past treatment response, good physical health    Patient Barriers/Limitations: difficulty adapting, lack of stable employment, noncompliant with medication, noncompliant with treatment, patient is on an involuntary commitment, resistance to treatment    Short Term Goals: decrease in homicidal thoughts, ability to stay safe on the unit, ability to stay free of restraints, mood stabilization, increase in group attendance, increase in socialization with peers on the unit, acceptance of need for psychiatric treatment, acceptance of psychiatric medications    Long Term Goals: stabilization of mood, free of suicidal thoughts, free of homicidal thoughts, acceptance of need for psychiatric medications, acceptance of need for psychiatric treatment, acceptance of need for psychiatric follow up after discharge, appropriate interaction with peers, appropriate interaction with family    Progress Towards Goals: starting psychiatric medications as prescribed    Recommended Treatment: medication management, patient medication education, group therapy, milieu therapy, continued Behavioral Health psychiatric evaluation/assessment process    Treatment Frequency: daily medication monitoring, group and milieu therapy daily, monitoring through interdisciplinary rounds, monitoring through weekly patient care conferences    Expected Discharge Date:  TBD    Discharge Plan: referral for outpatient medication management with a psychiatrist, referral for outpatient psychotherapy, referrals as indicated    Treatment Plan Created/Updated By: Jennifer Mills MD

## 2023-11-06 NOTE — PLAN OF CARE
Problem: DISCHARGE PLANNING - CARE MANAGEMENT  Goal: Discharge to post-acute care or home with appropriate resources  Description: INTERVENTIONS:  - Conduct assessment to determine patient/family and health care team treatment goals, and need for post-acute services based on payer coverage, community resources, and patient preferences, and barriers to discharge  - Address psychosocial, clinical, and financial barriers to discharge as identified in assessment in conjunction with the patient/family and health care team  - Arrange appropriate level of post-acute services according to patient’s   needs and preference and payer coverage in collaboration with the physician and health care team  - Communicate with and update the patient/family, physician, and health care team regarding progress on the discharge plan  - Arrange appropriate transportation to post-acute venues  Outcome: Progressing     New 302, goal initiated.

## 2023-11-06 NOTE — PROGRESS NOTES
11/06/23 1135   Activity/Group Checklist   Group Admission/Discharge   Attendance Attended   Attendance Duration (min) 16-30   Interactions Interacted appropriately   Affect/Mood Appropriate; Wide   Goals Achieved Identified feelings; Identified triggers; Identified relapse prevention strategies; Discussed coping strategies; Discussed discharge plans; Identified resources and support systems; Able to listen to others; Able to engage in interactions; Able to reflect/comment on own behavior;Able to manage/cope with feelings; Able to self-disclose; Able to recieve feedback     Patient was agreeable to meet and complete his self assessment and relapse prevention plan with [facility]. Patient is frustrated being here in the hospital; he does not feel he needs to be here. He shared that the last time he was in hospital he was on so many medication and he currently is down to a minimal amount and he does not feel he needs more at this time.

## 2023-11-06 NOTE — PROGRESS NOTES
Progress Note - [address] 25 y.o. male MRN: [id number]   Unit/Bed#: U 222-01 Encounter: [id number]    Behavior over the last 24 hours: unchanged. Buck Hughes seen today, per staff report has been less irritable still refusing medication on the unit. Pt seen today, is illogical and has poor reasoning skills. He is guarded and denies making threats to others at home or in the emergency room. Feels he should not be in the hospital. Has refused psychotropic medication. Poor sleep, appetite fair. Noncompliant with medication      Mental Status Evaluation:    Appearance:  casually dressed, marginal hygiene, looks stated age   Behavior:  guarded   Speech:  increased rate, hypertalkative   Mood:  anxious   Affect:  overbright, expansive   Thought Process:  illogical, circumstantial   Associations: circumstantial associations   Thought Content:  paranoid ideation   Perceptual Disturbances: denies auditory hallucinations when asked   Risk Potential: Suicidal ideation - None at present  Homicidal ideation - None at present, prior to admission. Denies at present. Sensorium:  oriented to person, place, and time/date   Memory:  recent and remote memory grossly intact   Consciousness:  alert and awake   Attention: decreased concentration and decreased attention span   Insight:  impaired   Judgment: impaired   Gait/Station: normal gait/station   Motor Activity: no abnormal movements     Vital signs in last 24 hours:    Temp:  [97.4 °F (36.3 °C)-97.5 °F (36.4 °C)] 97.5 °F (36.4 °C)  HR:  [70-82] 70  Resp:  [18] 18  BP: (132-142)/(83-85) 132/85    Laboratory results: I have personally reviewed all pertinent laboratory/tests results.         Assessment/Plan   Principal Problem:    Mood disorder (HCC) r/o Bipolar D/o  Active Problems:    Autism spectrum disorder    Continuous cannabis use    History of oppositional defiant disorder    Recommended Treatment:     Planned medication and treatment changes:  Continue Abilify 5 mg daily and add Depakote 500 mg at bedtime for mood instability and paranoid ideations. Will obtain a Depakote level on November 11. After discussion with patient regarding need of medication and due to his being on an involuntary admission we would have to instate medications over refusal if he continues to not accept medications patient states that he will begin to take his medication. We will observe for compliance.   If patient continues to refuse medication we will get a second opinion for medications over refusal    All current active medications have been reviewed  Encourage group therapy, milieu therapy and occupational therapy  Behavioral Health checks every 7 minutes  Current Facility-Administered Medications   Medication Dose Route Frequency Provider Last Rate    acetaminophen  650 mg Oral Q6H PRN Sharolyn Feliz Medei, CRNP      acetaminophen  650 mg Oral Q4H PRN Sharolyn Feliz Medei, CRNP      acetaminophen  975 mg Oral Q6H PRN Sharolyn Feliz Medei, CRNP      aluminum-magnesium hydroxide-simethicone  30 mL Oral Q4H PRN Sharolyn Feliz Medei, CRNP      ARIPiprazole  5 mg Oral Daily Krystian Thao MD      haloperidol lactate  2.5 mg Intramuscular Q6H PRN Max 4/day Debora M Medei, CRNP      And    LORazepam  1 mg Intramuscular Q6H PRN Max 4/day Sharolyn Feliz Medei, CRNP      And    benztropine  0.5 mg Intramuscular Q6H PRN Max 4/day Debora M Medei, CRNP      haloperidol lactate  5 mg Intramuscular Q4H PRN Max 4/day Debora M Medei, CRNP      And    LORazepam  2 mg Intramuscular Q4H PRN Max 4/day Sharolyn Feliz Medei, CRNP      And    benztropine  1 mg Intramuscular Q4H PRN Max 4/day Debora M Medei, CRNP      benztropine  1 mg Oral Q6H PRN Sharolyn Feliz Medei, CRNP      divalproex sodium  500 mg Oral Daily Baltazar Shirley MD      haloperidol  2 mg Oral Q4H PRN Max 6/day Sharolyn Feliz Medei, CRNP      haloperidol  5 mg Oral Q6H PRN Max 4/day Sharolyn Feliz Medei, CRNP      haloperidol  5 mg Oral Q4H PRN Max 4/day [facility]      hydrOXYzine HCL  25 mg Oral Q6H PRN Max 4/day Elizebeth Nestor Medei, CRNP      hydrOXYzine HCL  50 mg Oral Q4H PRN Max 4/day Elizebeth Nestor Medei, CRNP      Or    LORazepam  1 mg Intramuscular Q4H PRN Elizebeth Nestor Medei, CRNP      LORazepam  1 mg Oral Q8H PRN Elizebeth Nestor Medei, CRNP      Or    LORazepam  2 mg Intramuscular Q8H PRN Elizebeth Nestor Medei, CRNP      polyethylene glycol  17 g Oral Daily PRN Elizebeth Nestor Medei, CRNP      traZODone  100 mg Oral HS PRN Elizebeth Nestor Medei, CRNP         Risks / Benefits of Treatment:    Risks, benefits, and possible side effects of medications explained to patient and patient verbalizes understanding and agreement for treatment. Counseling / Coordination of Care: Total floor / unit time spent today 50 minutes. Greater than 50% of total time was spent with the patient and / or family counseling and / or coordination of care. A description of counseling / coordination of care:  Patient's progress discussed with staff in treatment team meeting. Medications, treatment progress and treatment plan reviewed with patient.     Carleen Nugent MD 11/06/23

## 2023-11-06 NOTE — SOCIAL WORK
Hearing Documentation    303 hearing held today;  in attendance:  [address] - ; [address] [email or web address] [employer] office; staff psych; ;  pt;  [phone] recommendation upheld for up to an additional 20 days of inpt treatment at [facility];  [address] [place] [address] expires: 11/26/23    Commitment Josh [email or web address] sent copy of results, copy on pt chart, copy sent to scanning.

## 2023-11-06 NOTE — PLAN OF CARE
Problem: Risk for Violence/Aggression Toward Others  Goal: Refrain from harming others  Outcome: Progressing     Problem: Risk for Violence/Aggression Toward Others  Goal: Control angry outbursts  Description: Interventions:  - Monitor patient closely, per order  - Ensure early verbal de-escalation  - Monitor prn medication needs  - Set reasonable/therapeutic limits, outline behavioral expectations, and consequences   - Provide a non-threatening milieu, utilizing the least restrictive interventions   Outcome: Not Progressing

## 2023-11-06 NOTE — PROGRESS NOTES
11/06/23   Team Meeting   Meeting Type Daily Rounds   Team Members Present   Team Members Present Physician;Nurse;   Physician Team Member Dr Chary Romero MD; Luci Odonnell [place]   Nursing Team Member Siria zayas[address]   Social Work Team Member Yudi Frances [place]   Patient/Family Present   Patient Present No   Patient's Family Present No     New [phone], 303 today 11am, increased aggression, threatening family, in 4pts twice in ED, compliant at admission, rapid mood shifts, labile, continues to threaten family over weekend, peeing on towels to throw at staff, possible MOO.

## 2023-11-06 NOTE — PROGRESS NOTES
Progress Note - Aaliyah Salcedo 25 y.o. male MRN: [id number]    Unit/Bed#: [facility] [address] Encounter: [id number]        Subjective:   Patient seen and examined at bedside after reviewing the chart and discussing the case with the caring staff. Patient examined at bedside. Patient has no acute issues. Patient's labs including vitamin D and B12 levels are still pending. Physical Exam   Vitals: Blood pressure 132/85, pulse 70, temperature 97.5 °F (36.4 °C), temperature source Temporal, resp. rate 18, height 5' 8" (1.727 m), weight 73.5 kg (162 lb), SpO2 99 %. ,Body mass index is 24.63 kg/m². Constitutional: He appears well-developed. HEENT: PERR, EOMI, MMM  Cardiovascular: Normal rate and regular rhythm. Pulmonary/Chest: Effort normal and breath sounds normal.   Abdomen: Soft, + BS, NT    Assessment/Plan:  Aaliyah Salcedo is a(n) 25y.o. year old male with MDD     1. Arthralgia/headache. Patient may get Tylenol as needed. 2.  Insomnia. Patient may get trazodone as needed.

## 2023-11-06 NOTE — NURSING NOTE
Patient presents as superficially bright and overly polite when making requests from staff. This evening he demonstrated a significantly rapid mood shift was noted when the patient was asked to repeat himself due to loud fan in nurses station, patient loudly and aggressively "CRACKERS I NEED FUCKING CRACKERS". Limits on appropriate behavior immediately set and patient's affect went back to bright with his speech drastically softer. Extremely bizarre. Denies psychosis. Denies needs. Intermittently social. Noted to have refused AM medication, states he does not need medication.

## 2023-11-06 NOTE — SOCIAL WORK
Patient Intake: sw met with pt in small group room for psychosocial, ROIs. Pt presents labile but cooperative    Legal status: new 302, 303 completed today; from [facility] for increased aggression at home, threatening     Current SI:  None  Current HI: None  AVH:  None  Depression:  None  Anxiety:   none      Strengths:unstable mental health, family relationships  Stressors/Limitations: no significant stressors  Coping skills:    SA/SI in last 12 months: None  HI/violence towards others in last 12 months: None, yes during [id number] [facility] episode, threatening to harm family  Access to Firearms: No  Hx abuse/trauma: denies all      Treatment History: 3 prior AIP; details unknown    Current Treatment:                 Psychiatrist: None                Therapist: None    Legal Issues:  hx arrest of domestic violence arrest; none current  Substance Abuse:   thc, etoh; declined D&A    Marial Status: single  Children:  denies  Pets: denies   Can patient return home?: yes per pt, but would need to talk with family due to aggression at home. Family:   Parents: mother-strained relationship   Siblings: brother-strained relationship  Family hx (MH/SI/HI/substance use): states mother has bipolar; denies all other family hx      Type of Work: unemployed, last: [employer]   Income/Financial Supports:   Education: [employer], no college  : denies  Transportation: family   Gnosticism/Cultural Needs: denies all   POA/guardianship/advanced directives: denies     Pharm: unknown    No current psych provider; will be Count includes the [facility] referral due to no current insurance; declined D&A, pcp.     SHANNAN signed:  Gutierrez Martins (Mother) [phone]; not available, no vm option

## 2023-11-07 LAB
25(OH)D3 SERPL-MCNC: 13.1 NG/ML (ref 30–100)
ALBUMIN SERPL BCP-MCNC: 4.3 G/DL (ref 3.5–5)
ALP SERPL-CCNC: 99 U/L (ref 34–104)
ALT SERPL W P-5'-P-CCNC: 34 U/L (ref 7–52)
ANION GAP SERPL CALCULATED.3IONS-SCNC: 6 MMOL/L
AST SERPL W P-5'-P-CCNC: 27 U/L (ref 13–39)
BASOPHILS # BLD AUTO: 0.02 THOUSANDS/ÂΜL (ref 0–0.1)
BASOPHILS NFR BLD AUTO: 0 % (ref 0–1)
BILIRUB SERPL-MCNC: 0.56 MG/DL (ref 0.2–1)
BUN SERPL-MCNC: 9 MG/DL (ref 5–25)
CALCIUM SERPL-MCNC: 9.6 MG/DL (ref 8.4–10.2)
CHLORIDE SERPL-SCNC: 101 MMOL/L (ref 96–108)
CHOLEST SERPL-MCNC: 125 MG/DL
CO2 SERPL-SCNC: 31 MMOL/L (ref 21–32)
CREAT SERPL-MCNC: 0.87 MG/DL (ref 0.6–1.3)
EOSINOPHIL # BLD AUTO: 0.13 THOUSAND/ÂΜL (ref 0–0.61)
EOSINOPHIL NFR BLD AUTO: 2 % (ref 0–6)
ERYTHROCYTE [DISTWIDTH] IN BLOOD BY AUTOMATED COUNT: 14.4 % (ref 11.6–15.1)
EST. AVERAGE GLUCOSE BLD GHB EST-MCNC: 117 MG/DL
FOLATE SERPL-MCNC: 5.1 NG/ML
GFR SERPL CREATININE-BSD FRML MDRD: 122 ML/MIN/1.73SQ M
GLUCOSE P FAST SERPL-MCNC: 81 MG/DL (ref 65–99)
GLUCOSE SERPL-MCNC: 81 MG/DL (ref 65–140)
HBA1C MFR BLD: 5.7 %
HCT VFR BLD AUTO: 50.3 % (ref 36.5–49.3)
HDLC SERPL-MCNC: 43 MG/DL
HGB BLD-MCNC: 16 G/DL (ref 12–17)
IMM GRANULOCYTES # BLD AUTO: 0.01 THOUSAND/UL (ref 0–0.2)
IMM GRANULOCYTES NFR BLD AUTO: 0 % (ref 0–2)
LDLC SERPL CALC-MCNC: 66 MG/DL (ref 0–100)
LYMPHOCYTES # BLD AUTO: 2.27 THOUSANDS/ÂΜL (ref 0.6–4.47)
LYMPHOCYTES NFR BLD AUTO: 35 % (ref 14–44)
MCH RBC QN AUTO: 28.8 PG (ref 26.8–34.3)
MCHC RBC AUTO-ENTMCNC: 31.8 G/DL (ref 31.4–37.4)
MCV RBC AUTO: 91 FL (ref 82–98)
MONOCYTES # BLD AUTO: 0.5 THOUSAND/ÂΜL (ref 0.17–1.22)
MONOCYTES NFR BLD AUTO: 8 % (ref 4–12)
NEUTROPHILS # BLD AUTO: 3.5 THOUSANDS/ÂΜL (ref 1.85–7.62)
NEUTS SEG NFR BLD AUTO: 55 % (ref 43–75)
NONHDLC SERPL-MCNC: 82 MG/DL
NRBC BLD AUTO-RTO: 0 /100 WBCS
PLATELET # BLD AUTO: 155 THOUSANDS/UL (ref 149–390)
POTASSIUM SERPL-SCNC: 3.5 MMOL/L (ref 3.5–5.3)
PROT SERPL-MCNC: 7.4 G/DL (ref 6.4–8.4)
RBC # BLD AUTO: 5.56 MILLION/UL (ref 3.88–5.62)
SODIUM SERPL-SCNC: 138 MMOL/L (ref 135–147)
TRIGL SERPL-MCNC: 82 MG/DL
TSH SERPL DL<=0.05 MIU/L-ACNC: 1.33 UIU/ML (ref 0.45–4.5)
VIT B12 SERPL-MCNC: 160 PG/ML (ref 180–914)
WBC # BLD AUTO: 6.43 THOUSAND/UL (ref 4.31–10.16)

## 2023-11-07 PROCEDURE — 85025 COMPLETE CBC W/AUTO DIFF WBC: CPT | Performed by: PSYCHIATRY & NEUROLOGY

## 2023-11-07 PROCEDURE — 84443 ASSAY THYROID STIM HORMONE: CPT | Performed by: PSYCHIATRY & NEUROLOGY

## 2023-11-07 PROCEDURE — 83036 HEMOGLOBIN GLYCOSYLATED A1C: CPT | Performed by: PSYCHIATRY & NEUROLOGY

## 2023-11-07 PROCEDURE — 82306 VITAMIN D 25 HYDROXY: CPT | Performed by: FAMILY MEDICINE

## 2023-11-07 PROCEDURE — NC001 PR NO CHARGE: Performed by: PSYCHIATRY & NEUROLOGY

## 2023-11-07 PROCEDURE — 99232 SBSQ HOSP IP/OBS MODERATE 35: CPT | Performed by: NURSE PRACTITIONER

## 2023-11-07 PROCEDURE — 80061 LIPID PANEL: CPT | Performed by: PSYCHIATRY & NEUROLOGY

## 2023-11-07 PROCEDURE — 82746 ASSAY OF FOLIC ACID SERUM: CPT | Performed by: FAMILY MEDICINE

## 2023-11-07 PROCEDURE — 80053 COMPREHEN METABOLIC PANEL: CPT | Performed by: PSYCHIATRY & NEUROLOGY

## 2023-11-07 PROCEDURE — 82607 VITAMIN B-12: CPT | Performed by: FAMILY MEDICINE

## 2023-11-07 RX ORDER — OLANZAPINE 5 MG/1
5 TABLET ORAL
Status: DISCONTINUED | OUTPATIENT
Start: 2023-11-07 | End: 2023-11-07

## 2023-11-07 RX ORDER — OLANZAPINE 5 MG/1
5 TABLET, ORALLY DISINTEGRATING ORAL
Status: DISCONTINUED | OUTPATIENT
Start: 2023-11-07 | End: 2023-11-09

## 2023-11-07 RX ORDER — OLANZAPINE 10 MG/2ML
5 INJECTION, POWDER, FOR SOLUTION INTRAMUSCULAR
Status: DISCONTINUED | OUTPATIENT
Start: 2023-11-07 | End: 2023-11-09

## 2023-11-07 RX ADMIN — Medication 2 G: at 18:58

## 2023-11-07 RX ADMIN — ARIPIPRAZOLE 5 MG: 5 TABLET ORAL at 08:32

## 2023-11-07 RX ADMIN — OLANZAPINE 5 MG: 5 TABLET, ORALLY DISINTEGRATING ORAL at 20:19

## 2023-11-07 NOTE — NURSING NOTE
Patient compliant with meds and meals. Patient denies all states he does not need to be here. Patient approached this writer an CM stating he felt we were ruining his life outside of here because he can't pay his bills and he feels he does not need medication this writer reassured patient that it is frustrating and patient was able to go to room. Patient is social and visible. Q 7 min behavioral and safety checks in place.

## 2023-11-07 NOTE — PROGRESS NOTES
Progress Note - [address] 25 y.o. male MRN: [id number]  Unit/Bed#: -01 Encounter: [id number]    Assessment/Plan   Principal Problem:    Mood disorder ([address]) r/o Bipolar D/o  Active Problems:    Autism spectrum disorder    Continuous cannabis use    History of oppositional defiant disorder      Behavior over the last 24 hours:  unchanged  Sleep: Poor  Appetite: normal  Medication side effects: No  ROS: no complaints and all other systems are negative    [place] Ronald remains visible in the milieu. Presents as bizarre, manic, and internally preoccupied. Behavior is inappropriate and would begin laughing uncontrollably without reason during encounter. Denies AVH, but grossly psychotic. When discussing discontinuation of Abilify and reinitiating Zyprexa, patient stated "we'll see what they say about that."  Upon clarification if patient felt he was going to become agitated or aggressive or had thoughts of SI/HI, patient stated "nah, I feel safe. It's just I don't know how they're going to react on the outside."  Begin laughing uncontrollably and continued to respond to internal stimuli prompting termination of encounter.     Mental Status Evaluation:  Appearance:  piercings and athletic build -American male, dyed hair   Behavior:  Bizarre, inappropriate, manic   Speech:  normal pitch and normal volume   Mood:  euphoric   Affect:  increased in intensity and labile   Thought Process:  disorganized and illogical   Associations: loose associations   Thought Content:  Grandiose, paranoid   Perceptual Disturbances: Denied AVH, but actively responding to internal stimuli   Risk Potential: Suicidal Ideations none at present  Homicidal Ideations none at present  Potential for Aggression Yes due to low frustration tolerance and poor impulse control, history of aggressive/assaultive behaviors, mood lability, and paranoia   Sensorium:  person and place   Memory:  recent and remote memory: unable to assess due to lack of cooperation, patient does not answer   Consciousness:  alert and awake    Attention: Decreased attention/concentration   Insight:  Impaired   Judgment: Poor   Gait/Station: normal gait/station and normal balance   Motor Activity: no abnormal movements     Progress Toward Goals: Remains psychotic and manic. Sleep poor. Refused medication last evening. Plan is to discontinue Abilify and initiate Zyprexa Zydis 5 mg PO QHS for psychosis, agitation, and mood stabilization. Medication over objection order in place; patient to receive IM Zyprexa 5 mg PO QHS but he refused oral dosing. No aggressive behaviors. Plan is to further titrate Zyprexa and Depakote for psychosis and mood stabilization. Recommended Treatment: Continue with group therapy, milieu therapy and occupational therapy. Risks, benefits and possible side effects of Medications:   Patient does not verbalize understanding at this time and will require further explanation.       Medications:   Continue Abilify 5 mg PO QD  Initiate Zyprexa Zydis 5 mg PO QHS  all current active meds have been reviewed and current meds:   Current Facility-Administered Medications   Medication Dose Route Frequency    acetaminophen (TYLENOL) tablet 650 mg  650 mg Oral Q6H PRN    acetaminophen (TYLENOL) tablet 650 mg  650 mg Oral Q4H PRN    acetaminophen (TYLENOL) tablet 975 mg  975 mg Oral Q6H PRN    aluminum-magnesium hydroxide-simethicone (MAALOX) oral suspension 30 mL  30 mL Oral Q4H PRN    haloperidol lactate (HALDOL) injection 2.5 mg  2.5 mg Intramuscular Q6H PRN Max 4/day    And    LORazepam (ATIVAN) injection 1 mg  1 mg Intramuscular Q6H PRN Max 4/day    And    benztropine (COGENTIN) injection 0.5 mg  0.5 mg Intramuscular Q6H PRN Max 4/day    haloperidol lactate (HALDOL) injection 5 mg  5 mg Intramuscular Q4H PRN Max 4/day    And    LORazepam (ATIVAN) injection 2 mg  2 mg Intramuscular Q4H PRN Max 4/day    And    benztropine (COGENTIN) injection 1 mg  1 mg Intramuscular Q4H PRN Max 4/day    benztropine (COGENTIN) tablet 1 mg  1 mg Oral Q6H PRN    divalproex sodium (DEPAKOTE ER) 24 hr tablet 500 mg  500 mg Oral HS    haloperidol (HALDOL) tablet 2 mg  2 mg Oral Q4H PRN Max 6/day    haloperidol (HALDOL) tablet 5 mg  5 mg Oral Q6H PRN Max 4/day    haloperidol (HALDOL) tablet 5 mg  5 mg Oral Q4H PRN Max 4/day    hydrOXYzine HCL (ATARAX) tablet 25 mg  25 mg Oral Q6H PRN Max 4/day    hydrOXYzine HCL (ATARAX) tablet 50 mg  50 mg Oral Q4H PRN Max 4/day    Or    LORazepam (ATIVAN) injection 1 mg  1 mg Intramuscular Q4H PRN    LORazepam (ATIVAN) tablet 1 mg  1 mg Oral Q8H PRN    Or    LORazepam (ATIVAN) injection 2 mg  2 mg Intramuscular Q8H PRN    OLANZapine (ZyPREXA ZYDIS) dispersible tablet 5 mg  5 mg Oral HS    Or    OLANZapine (ZyPREXA) IM injection 5 mg  5 mg Intramuscular HS    polyethylene glycol (MIRALAX) packet 17 g  17 g Oral Daily PRN    traZODone (DESYREL) tablet 100 mg  100 mg Oral HS PRN   . Labs: I have personally reviewed all pertinent laboratory/tests results.  CBC:   Lab Results   Component Value Date    WBC 6.43 11/07/2023    RBC 5.56 11/07/2023    HGB 16.0 11/07/2023    HCT 50.3 (H) 11/07/2023    MCV 91 11/07/2023     11/07/2023    MCH 28.8 11/07/2023    MCHC 31.8 11/07/2023    RDW 14.4 11/07/2023    NEUTROABS 3.50 11/07/2023     CMP:   Lab Results   Component Value Date    SODIUM 138 11/07/2023    K 3.5 11/07/2023     11/07/2023    CO2 31 11/07/2023    AGAP 6 11/07/2023    BUN 9 11/07/2023    CREATININE 0.87 11/07/2023    GLUC 81 11/07/2023    GLUF 81 11/07/2023    CALCIUM 9.6 11/07/2023    AST 27 11/07/2023    ALT 34 11/07/2023    ALKPHOS 99 11/07/2023    TP 7.4 11/07/2023    ALB 4.3 11/07/2023    TBILI 0.56 11/07/2023    EGFR 122 11/07/2023     Drug Screen:   Lab Results   Component Value Date    AMPMETHUR Negative 11/01/2023    BARBTUR Negative 11/01/2023    BDZUR Negative 11/01/2023    THCUR Positive (A) 11/01/2023    COCAINEUR Negative 11/01/2023    METHADONEUR Negative 11/01/2023    OPIATEUR Negative 11/01/2023    PCPUR Negative 11/01/2023     EKG   Lab Results   Component Value Date    VENTRATE 69 11/05/2023    ATRIALRATE 69 11/05/2023    PRINT 160 11/05/2023    QRSDINT 94 11/05/2023    QTINT 402 11/05/2023    QTCINT 430 11/05/2023    PAXIS 53 11/05/2023    QRSAXIS 65 11/05/2023    TWAVEAXIS 36 11/05/2023       Counseling / Coordination of Care  Total floor / unit time spent today 30 minutes. Greater than 50% of total time was spent with the patient and / or family counseling and / or coordination of care.  A description of the counseling / coordination of care: Medication education, treatment plan, safety planning

## 2023-11-07 NOTE — SOCIAL WORK
Pt requested to speak to Madie RN regarding frustration related to admission stating "You are really fucking things up for me on the outside. I had everything in order and I have bills to pay. If I stay here, I will take things to the next level". Verbal redirection, support not effective. Pt encouraged to return to room.

## 2023-11-07 NOTE — PLAN OF CARE
Problem: Risk for Violence/Aggression Toward Others  Goal: Refrain from harming others  Outcome: Progressing     Problem: Risk for Violence/Aggression Toward Others  Goal: Verbalize thoughts and feelings  Description: Interventions:  - Assess and re-assess patient's level of risk, every waking shift  - Engage patient in 1:1 interactions, daily, for a minimum of 15 minutes   - Allow patient to express feelings and frustrations in a safe and non-threatening manner   - Establish rapport/trust with patient   Outcome: Not Progressing  Goal: Control angry outbursts  Description: Interventions:  - Monitor patient closely, per order  - Ensure early verbal de-escalation  - Monitor prn medication needs  - Set reasonable/therapeutic limits, outline behavioral expectations, and consequences   - Provide a non-threatening milieu, utilizing the least restrictive interventions   Outcome: Not Progressing

## 2023-11-07 NOTE — NURSING NOTE
Patient seen in milieu socializing with peers. He continues to demonstrate mood lability with poor insight this evening, easily agitated by other peers, however no acute behaviors. He did refuse his new order of Depakote 500 mg due to reporting that the doctors are "treating me like a fucking lab rat". Patient also intrusive with care of other peers, overheard making snide remarks. He denies depression, anxiety, SI/HI and hallucinations. Makes needs known. Limit setting and care ongoing. Q7 minute monitoring ongoing.

## 2023-11-07 NOTE — PROGRESS NOTES
11/07/23   Team Meeting   Meeting Type Daily Rounds   Team Members Present   Team Members Present Physician;Nurse;   Physician Team Member Dr Koby Riggs MD; Kam Park, [address] [place] [address]   Nursing Team Member Siria zayas [place]   Social Work Team Member Stephanie Lopez, [place]   Patient/Family Present   Patient Present No   Patient's Family Present No     Targeting peers, 18 upheld yest, rapid mood shifts, paranoid, passive remarks towards peers, over stimulated due to noise, laid in rm 36 for stimulation decrease, denies all psych symptoms, internally preoccupied, agitated, refused PM Depakote, attempted to cheek AM meds, showered, labs obtained, minimal sleep, MOO, switched to Zyprexa, insurance lapsed.

## 2023-11-07 NOTE — PROGRESS NOTES
Progress Note - Donavan Arita 25 y.o. male MRN: [id number]    Unit/Bed#: [facility] 222-01 Encounter: [id number]        Subjective:   Patient seen and examined at bedside after reviewing the chart and discussing the case with the caring staff. Patient examined at bedside. Patient has no acute issues. Patient's labs including vitamin D and B12 levels are still pending. Physical Exam   Vitals: Blood pressure 148/89, pulse 71, temperature 97.5 °F (36.4 °C), temperature source Temporal, resp. rate 18, height 5' 8" (1.727 m), weight 73.5 kg (162 lb), SpO2 98 %. ,Body mass index is 24.63 kg/m². Constitutional: He appears well-developed. HEENT: PERR, EOMI, MMM  Cardiovascular: Normal rate and regular rhythm. Pulmonary/Chest: Effort normal and breath sounds normal.   Abdomen: Soft, + BS, NT    Assessment/Plan:  Donavan Arita is a(n) 25y.o. year old male with MDD     1. Arthralgia/headache. Patient may get Tylenol as needed. 2.  Insomnia. Patient may get trazodone as needed.

## 2023-11-07 NOTE — CONSULTS
SECOND OPINION FOR INJECTABLE PSYCHIATRIC MEDICATIONS    Wei Lewis 25 y.o. male MRN: [id number]  Unit/Bed#: [facility] 222-01 Encounter: [id number]      Reason for Admission/Current Symptoms:    Wei Lewis is a 25 y.o. male with a history of depression versus bipolar disorder, anxiety, ADHD, and Autistic disorder who was admitted to the inpatient psychiatric unit on a involuntary 302 commitment basis due to unstable mood, increased agitation, aggressive behavior, and homicidal threats toward family members. 303 hearing was upheld on 11/06/23 for up to additional 20 days in inpatient treatment. Symptoms prior to admission included mood swings, increased irritability, anger outbursts, aggressive behavior, and noncompliance with medications. Second opinion for injectable psychotropic medications was requested by Dr. Francisco J Kendall due to Rodríguez's refusal to take oral medications. On evaluation today Zurdo Michel presents initially irritable, guarded but later was able be engaged in a conversation. He acknowledged he came to the psych unit because he threatened his family members but denies making homicidal threats. He states his main conflict is with his brother. Pt believes his mom is "siding with him" and he is getting what he wants from his mother. He states that he was diagnosed with chronic depression, bipolar disorder, ODD, ADHD and Autistic disorder and was prescribed multiple psych meds in the past. Merced Peguero not being adherence, stating "none of the medication helped me with my mental illness, they give me side effects" and ruminates about getting medical marijuana instead. Discussed about his current 303 status and his requirement to take oral medication, otherwise IM medication would be administer. However, patient does not believe he needs to be in the hospital, demonstrates poor insight and continues refusing to take any oral medication during assessment.      Mental Status Evaluation:    Appearance:  age appropriate   Behavior:  restless   Speech:  increased rate   Mood:  dysphoric, irritable   Affect:  labile   Thought Process:  circumstantial, increased rate of thoughts   Associations: circumstantial associations   Thought Content:  some paranoia   Perceptual Disturbances: denies auditory hallucinations when asked   Risk Potential: Suicidal ideation - None at present  Homicidal ideation - None at present  Potential for aggression - Yes, due to poor impulse control   Sensorium:  oriented to person, place, and time/date   Memory:  recent and remote memory grossly intact, grossly intact   Consciousness:  alert and awake    Attention: attention span and concentration appear shorter than expected for age   Insight:  poor   Judgment: limited   Gait/Station: normal gait/station   Motor Activity: no abnormal movements       Vital signs in last 24 hours:    Temp:  [97.5 °F (36.4 °C)-98.1 °F (36.7 °C)] 97.5 °F (36.4 °C)  HR:  [71-91] 71  Resp:  [18] 18  BP: (146-148)/(87-89) 148/89    Labs: I have personally reviewed all pertinent laboratory/tests results. Most Recent Labs:   Lab Results   Component Value Date    WBC 6.43 11/07/2023    RBC 5.56 11/07/2023    HGB 16.0 11/07/2023    HCT 50.3 (H) 11/07/2023     11/07/2023    RDW 14.4 11/07/2023    NEUTROABS 3.50 11/07/2023    SODIUM 138 11/07/2023    K 3.5 11/07/2023     11/07/2023    CO2 31 11/07/2023    BUN 9 11/07/2023    CREATININE 0.87 11/07/2023    GLUC 81 11/07/2023    GLUF 81 11/07/2023    CALCIUM 9.6 11/07/2023    AST 27 11/07/2023    ALT 34 11/07/2023    ALKPHOS 99 11/07/2023    TP 7.4 11/07/2023    ALB 4.3 11/07/2023    TBILI 0.56 11/07/2023    CHOLESTEROL 125 11/07/2023    HDL 43 11/07/2023    TRIG 82 11/07/2023    LDLCALC 66 11/07/2023    NONHDLC 82 11/07/2023    [id number] 1.331 11/07/2023    HGBA1C 5.1 07/28/2022     07/28/2022       Medications: All current active medications have been reviewed.     Current medications:  Current Facility-Administered Medications   Medication Dose Route Frequency Provider Last Rate    acetaminophen  650 mg Oral Q6H PRN Quyen Maxcy Medei, CRNP      acetaminophen  650 mg Oral Q4H PRN Quyen Maxcy Medei, CRNP      acetaminophen  975 mg Oral Q6H PRN Quyen Maxcy Medei, CRNP      aluminum-magnesium hydroxide-simethicone  30 mL Oral Q4H PRN Quyen Maxcy Medei, CRNP      haloperidol lactate  2.5 mg Intramuscular Q6H PRN Max 4/day Quyen Maxcy Medei, CRNP      And    LORazepam  1 mg Intramuscular Q6H PRN Max 4/day Quyen Maxcy Medei, CRNP      And    benztropine  0.5 mg Intramuscular Q6H PRN Max 4/day Quyen Maxcy Medei, CRNP      haloperidol lactate  5 mg Intramuscular Q4H PRN Max 4/day Quyen Maxcy Medei, CRNP      And    LORazepam  2 mg Intramuscular Q4H PRN Max 4/day Quyen Maxcy Medei, CRNP      And    benztropine  1 mg Intramuscular Q4H PRN Max 4/day Quyen Maxcy Medei, CRNP      benztropine  1 mg Oral Q6H PRN Quyen Maxcy Medei, CRNP      divalproex sodium  500 mg Oral HS Keyshawn Aguayo MD      haloperidol  2 mg Oral Q4H PRN Max 6/day Quyen Maxcy Medei, CRNP      haloperidol  5 mg Oral Q6H PRN Max 4/day Quyen Maxcy Medei, CRNP      haloperidol  5 mg Oral Q4H PRN Max 4/day Quyen Maxcy Medei, CRNP      hydrOXYzine HCL  25 mg Oral Q6H PRN Max 4/day Quyen Maxcy Medei, CRNP      hydrOXYzine HCL  50 mg Oral Q4H PRN Max 4/day Quyen Maxcy Medei, CRNP      Or    LORazepam  1 mg Intramuscular Q4H PRN Quyen Maxcy Medei, CRNP      LORazepam  1 mg Oral Q8H PRN Quyen Maxcy Medei, CRNP      Or    LORazepam  2 mg Intramuscular Q8H PRN Quyen Maxcy Medei, CRNP      OLANZapine  5 mg Oral HS Keyshawn Aguayo MD      polyethylene glycol  17 g Oral Daily PRN Quyen Maxcy Medei, CRNP      traZODone  100 mg Oral HS PRN JAYA Lawler         Assessment/Plan     Principal Problem:    Mood disorder ([address]) r/o Bipolar D/o  Active Problems:    Autism spectrum disorder    Continuous cannabis use    History of oppositional defiant disorder      Recommended Treatment:     Kesha Livingston has long history of psychiatric illness and is not likely to improve without medications. I believe that patient requires administration of injectable medications against his will, if he continues refusing oral meds, to achieve clinical improvement and assure safety of self and others.      Jatinder Carrillo MD 11/07/23
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
## 2023-11-08 PROCEDURE — 99232 SBSQ HOSP IP/OBS MODERATE 35: CPT | Performed by: NURSE PRACTITIONER

## 2023-11-08 RX ORDER — OLANZAPINE 5 MG/1
5 TABLET, ORALLY DISINTEGRATING ORAL DAILY
Status: DISCONTINUED | OUTPATIENT
Start: 2023-11-08 | End: 2023-11-09

## 2023-11-08 RX ORDER — MELATONIN
1000 DAILY
Status: DISCONTINUED | OUTPATIENT
Start: 2024-01-12 | End: 2023-11-28 | Stop reason: HOSPADM

## 2023-11-08 RX ORDER — OLANZAPINE 10 MG/2ML
5 INJECTION, POWDER, FOR SOLUTION INTRAMUSCULAR DAILY
Status: DISCONTINUED | OUTPATIENT
Start: 2023-11-08 | End: 2023-11-09

## 2023-11-08 RX ORDER — ERGOCALCIFEROL 1.25 MG/1
50000 CAPSULE ORAL WEEKLY
Status: DISCONTINUED | OUTPATIENT
Start: 2023-11-09 | End: 2023-11-28 | Stop reason: HOSPADM

## 2023-11-08 RX ADMIN — TRAZODONE HYDROCHLORIDE 100 MG: 100 TABLET ORAL at 20:40

## 2023-11-08 RX ADMIN — Medication 2 G: at 01:55

## 2023-11-08 RX ADMIN — OLANZAPINE 5 MG: 5 TABLET, ORALLY DISINTEGRATING ORAL at 20:38

## 2023-11-08 RX ADMIN — OLANZAPINE 5 MG: 5 TABLET, ORALLY DISINTEGRATING ORAL at 11:04

## 2023-11-08 NOTE — NURSING NOTE
Patient in hallway laughing inappropriately, appears internally pre occupied. Patient returned to his room where he continued to laugh and talk to self. Will monitor.

## 2023-11-08 NOTE — PROGRESS NOTES
Progress Note - Latanya Wagoner 25 y.o. male MRN: [id number]    Unit/Bed#: [facility] 222-01 Encounter: [id number]        Subjective:   Patient seen and examined at bedside after reviewing the chart and discussing the case with the caring staff. Patient examined at bedside. Patient has no acute complaints. Physical Exam   Vitals: Blood pressure 136/86, pulse 65, temperature 98.9 °F (37.2 °C), temperature source Temporal, resp. rate 18, height 5' 8" (1.727 m), weight 73.5 kg (162 lb), SpO2 99 %. ,Body mass index is 24.63 kg/m². Constitutional: Patient in no acute distress. HEENT: PERR, EOMI, MMM, neck supple. Cardiovascular: Normal rate and regular rhythm. Pulmonary/Chest: Effort normal and breath sounds normal.  Abdomen: Non distended. Assessment/Plan:  Latanya Wagoner is a(n) 25y.o. year old male with mood disorder. 1.  Arthralgia/headache. Patient may get Tylenol as needed. 2.  Insomnia. Patient may take trazodone as needed. 3.  Prediabetes. Hgb A1c 5.7% on 11/7/23. Lifestyle modifications. 4.  Vitamin B12 deficiency. Patient started on vitamin B12 supplement. 5.  Vitamin D deficiency. Patient started on vitamin D2 88734 units weekly x 10 weeks followed by vitamin D3 1000 units daily. The patient was discussed with Dr. Mara Almaguer and he is in agreement with the above note.

## 2023-11-08 NOTE — NURSING NOTE
Patient awake late. Once patient fell asleep. Patient slept well during Q 7 minute checks. Easy non labored breathing noted. No changes in medical condition. No behaviors noted. Monitoring continues.

## 2023-11-08 NOTE — NURSING NOTE
Patient has been visible in the milieu this morning. His mood remains labile. He questions his medications and continues to insist he has an allergy to Depakote. He continues to appear internally preoccupied. Reports not feeling he needs to have psychiatric care and requests to leave. He is redirectable at this time. His appetite is good. Denies pain. He is able to make his basic needs known. Plan of care continues. Q7 minute safety checks in progress.

## 2023-11-08 NOTE — NURSING NOTE
Patient is irritable and demanding. He states he feels he is being held here against his will and we are lying to him. Pt attempted to call his mother x's 3 and her cell phone is turned off- he states "you have to call from a smart phone because a land line can't  her voicemail" pt becomes more irritable when nursing staff declines to allow him to use staff smart phones. He was redirected to his bedroom and he is currently ambulating the unit hallways rapping to himself. Pt is wearing ear plugs.

## 2023-11-08 NOTE — PLAN OF CARE
Problem: Ineffective Coping  Goal: Participates in unit activities  Description: Interventions:  - Provide therapeutic environment   - Provide required programming   - Redirect inappropriate behaviors   Outcome: Progressing     Problem: Risk for Violence/Aggression Toward Others  Goal: Verbalize thoughts and feelings  Description: Interventions:  - Assess and re-assess patient's level of risk, every waking shift  - Engage patient in 1:1 interactions, daily, for a minimum of 15 minutes   - Allow patient to express feelings and frustrations in a safe and non-threatening manner   - Establish rapport/trust with patient   Outcome: Progressing

## 2023-11-08 NOTE — PROGRESS NOTES
11/08/23 0755   Team Meeting   Meeting Type Daily Rounds   Team Members Present   Team Members Present Physician;Nurse;   Physician Team Member Dr Yared Garcia MD; HOSP DR. CARRIE MORATAYA, [address] [place] [address]   Nursing Team Member Siria zayas, [address]   Social Work Team Member [place], [place]   Patient/Family Present   Patient Present No   Patient's Family Present No     Refused PM Depakote, took zyprexa, punching air, internally preoccupied, loud, poor sleep, disrupting peers, agitated, laughing to self, cursing, possible no roommate, bizarre movements, angry, psychotic, increase zyprexa zidis 5mg BID, add scheduled trazodone
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
## 2023-11-08 NOTE — NURSING NOTE
Patient compliant with medications and meals. Patient has labile mood irritable and verbally aggressive at times towards peers and staff. Patent appears paranoid and internally preoccupied denies everything states does not need to be here and has no mental health issues. Patient visible and social with select requires behavior redirection is able to follow. Q 7 min behavioral and safety checks in place.

## 2023-11-08 NOTE — NURSING NOTE
Patient visible on unit. Social with select peers. Denies SI,HI,AVH, anxiety and depression. States " I don't need to be here" Took pm Zyprexa but refused Depakote. Stated he is allergic to it. Safety checks continue Q 7 minutes.

## 2023-11-08 NOTE — NURSING NOTE
Patient awake pacing hallway. Patient had a flannel over his head when asked to remove it patient became irritable. Patient said "there was a patient with a black bandana on earlier and making [employer] and he didn't have to remove it" ( This was not observed by staff) Patient took off flannel and threw it on the floor. Patient continues to pace the unit. Will monitor.

## 2023-11-09 PROCEDURE — 99232 SBSQ HOSP IP/OBS MODERATE 35: CPT | Performed by: HOSPITALIST

## 2023-11-09 RX ORDER — TRAZODONE HYDROCHLORIDE 150 MG/1
150 TABLET ORAL
Status: DISCONTINUED | OUTPATIENT
Start: 2023-11-09 | End: 2023-11-28 | Stop reason: HOSPADM

## 2023-11-09 RX ORDER — OLANZAPINE 10 MG/2ML
5 INJECTION, POWDER, FOR SOLUTION INTRAMUSCULAR
Status: DISCONTINUED | OUTPATIENT
Start: 2023-11-09 | End: 2023-11-10

## 2023-11-09 RX ORDER — OLANZAPINE 10 MG/1
10 TABLET, ORALLY DISINTEGRATING ORAL DAILY
Status: DISCONTINUED | OUTPATIENT
Start: 2023-11-10 | End: 2023-11-13

## 2023-11-09 RX ORDER — OLANZAPINE 10 MG/1
10 TABLET, ORALLY DISINTEGRATING ORAL
Status: DISCONTINUED | OUTPATIENT
Start: 2023-11-09 | End: 2023-11-10

## 2023-11-09 RX ORDER — OLANZAPINE 10 MG/2ML
5 INJECTION, POWDER, FOR SOLUTION INTRAMUSCULAR DAILY
Status: DISCONTINUED | OUTPATIENT
Start: 2023-11-10 | End: 2023-11-13

## 2023-11-09 RX ADMIN — OLANZAPINE 5 MG: 5 TABLET, ORALLY DISINTEGRATING ORAL at 08:12

## 2023-11-09 RX ADMIN — CYANOCOBALAMIN TAB 500 MCG 1000 MCG: 500 TAB at 08:12

## 2023-11-09 RX ADMIN — OLANZAPINE 10 MG: 10 TABLET, ORALLY DISINTEGRATING ORAL at 20:17

## 2023-11-09 RX ADMIN — ERGOCALCIFEROL 50000 UNITS: 1.25 CAPSULE, LIQUID FILLED ORAL at 08:12

## 2023-11-09 RX ADMIN — TRAZODONE HYDROCHLORIDE 150 MG: 150 TABLET ORAL at 20:18

## 2023-11-09 NOTE — PROGRESS NOTES
Progress Note - [address] 25 y.o. male MRN: [id number]  Unit/Bed#: U 222-01 Encounter: [id number]    Assessment/Plan   Principal Problem:    Mood disorder ([address]) r/o Bipolar D/o  Active Problems:    Autism spectrum disorder    Continuous cannabis use    History of oppositional defiant disorder      Behavior over the last 24 hours:  unchanged  Sleep: Poor  Appetite: normal  Medication side effects: No  ROS: no complaints and all other systems are negative    Estela Ying has been visible in the milieu and participating appropriately in group. Continues to present as bizarre, irritable, and psychotic. Paranoia and hypervigilance persists. Preservative and illogical. Demanding discharge, but states "I don't care how long I'm here. I'm getting 3 meals a day and working out."  Began doing push-ups mid interview. States he has been compliant with medications with the exception of Depakote. Continues to exhibit poor reasoning ability with low frustration tolerance and impulse control. Mood labile. Intermittent agitation persists while in the hallways regarding peers and believes he is going to be attacked. Currently denies SI/HI.      Mental Status Evaluation:  Appearance:  Shirtless, dyed hair, piercings, sitting on edge of bed   Behavior:  Bizarre, irritable, labile, hypervigilant   Speech:  normal pitch and normal volume   Mood:  labile   Affect:  increased in intensity and labile   Thought Process:  illogical and perserverative   Associations: perseverative   Thought Content:  Paranoid   Perceptual Disturbances: Denied AVH, however appears intermittently preoccupied   Risk Potential: Suicidal Ideations none  Homicidal Ideations none  Potential for Aggression Yes due to low frustration tolerance, poor impulse control, mood lability, paranoia, and history of aggressive/assaultive behaviors   Sensorium:  person, place, and time/date   Memory:  recent and remote memory: unable to assess due to lack of cooperation, patient does not answer   Consciousness:  alert and awake    Attention: Decreased attention/concentration   Insight:  Impaired   Judgment: Poor   Gait/Station: normal gait/station and normal balance   Motor Activity: no abnormal movements     Progress Toward Goals: Remains intermittently agitated, labile, psychotic, paranoid, and illogical.  Sleep poor despite administration of PRN trazodone 100mg. Plan is to discontinue Depakote due to ongoing refusal and maximize Zyprexa for psychosis, agitation, and mood stabilization. Increase Zyprexa 10 mg PO BID. Will also increase Trazodone 150mg PO QHS PRN insomnia. Discontinue Depakote due to ongoing refusal.  Per chart review, patient has history of thrombocytopenia while on lithium therapy in 2021. Has been maintaining safety with no threats of SI/HI. No discharge date at this time. Recommended Treatment: Continue with group therapy, milieu therapy and occupational therapy. Risks, benefits and possible side effects of Medications:   Patient does not verbalize understanding at this time and will require further explanation.       Medications:   Increase Zyprexa 10 mg PO BID  Increase Trazodone 150mg PO QHS PRN insomnia  Discontinue Depakote 500 mg PO QHS  all current active meds have been reviewed and current meds:   Current Facility-Administered Medications   Medication Dose Route Frequency    acetaminophen (TYLENOL) tablet 650 mg  650 mg Oral Q6H PRN    acetaminophen (TYLENOL) tablet 650 mg  650 mg Oral Q4H PRN    acetaminophen (TYLENOL) tablet 975 mg  975 mg Oral Q6H PRN    aluminum-magnesium hydroxide-simethicone (MAALOX) oral suspension 30 mL  30 mL Oral Q4H PRN    haloperidol lactate (HALDOL) injection 2.5 mg  2.5 mg Intramuscular Q6H PRN Max 4/day    And    LORazepam (ATIVAN) injection 1 mg  1 mg Intramuscular Q6H PRN Max 4/day    And    benztropine (COGENTIN) injection 0.5 mg  0.5 mg Intramuscular Q6H PRN Max 4/day    haloperidol lactate (HALDOL) injection 5 mg  5 mg Intramuscular Q4H PRN Max 4/day    And    LORazepam (ATIVAN) injection 2 mg  2 mg Intramuscular Q4H PRN Max 4/day    And    benztropine (COGENTIN) injection 1 mg  1 mg Intramuscular Q4H PRN Max 4/day    benztropine (COGENTIN) tablet 1 mg  1 mg Oral Q6H PRN    [START ON 1/12/2024] cholecalciferol (VITAMIN D3) tablet 1,000 Units  1,000 Units Oral Daily    cyanocobalamin (VITAMIN B-12) tablet 1,000 mcg  1,000 mcg Oral Daily    Diclofenac Sodium (VOLTAREN) 1 % topical gel 2 g  2 g Topical 4x Daily PRN    ergocalciferol (VITAMIN D2) capsule 50,000 Units  50,000 Units Oral Weekly    haloperidol (HALDOL) tablet 2 mg  2 mg Oral Q4H PRN Max 6/day    haloperidol (HALDOL) tablet 5 mg  5 mg Oral Q6H PRN Max 4/day    haloperidol (HALDOL) tablet 5 mg  5 mg Oral Q4H PRN Max 4/day    hydrOXYzine HCL (ATARAX) tablet 25 mg  25 mg Oral Q6H PRN Max 4/day    hydrOXYzine HCL (ATARAX) tablet 50 mg  50 mg Oral Q4H PRN Max 4/day    Or    LORazepam (ATIVAN) injection 1 mg  1 mg Intramuscular Q4H PRN    LORazepam (ATIVAN) tablet 1 mg  1 mg Oral Q8H PRN    Or    LORazepam (ATIVAN) injection 2 mg  2 mg Intramuscular Q8H PRN    OLANZapine (ZyPREXA ZYDIS) dispersible tablet 10 mg  10 mg Oral HS    Or    OLANZapine (ZyPREXA) IM injection 5 mg  5 mg Intramuscular HS    [START ON 11/10/2023] OLANZapine (ZyPREXA ZYDIS) dispersible tablet 10 mg  10 mg Oral Daily    Or    [START ON 11/10/2023] OLANZapine (ZyPREXA) IM injection 5 mg  5 mg Intramuscular Daily    polyethylene glycol (MIRALAX) packet 17 g  17 g Oral Daily PRN    traZODone (DESYREL) tablet 150 mg  150 mg Oral HS PRN   . Labs: I have personally reviewed all pertinent laboratory/tests results.  CBC:   Lab Results   Component Value Date    WBC 6.43 11/07/2023    RBC 5.56 11/07/2023    HGB 16.0 11/07/2023    HCT 50.3 (H) 11/07/2023    MCV 91 11/07/2023     11/07/2023    MCH 28.8 11/07/2023    MCHC 31.8 11/07/2023    RDW 14.4 11/07/2023    NEUTROABS 3.50 11/07/2023     CMP:   Lab Results   Component Value Date    SODIUM 138 11/07/2023    K 3.5 11/07/2023     11/07/2023    CO2 31 11/07/2023    AGAP 6 11/07/2023    BUN 9 11/07/2023    CREATININE 0.87 11/07/2023    GLUC 81 11/07/2023    GLUF 81 11/07/2023    CALCIUM 9.6 11/07/2023    AST 27 11/07/2023    ALT 34 11/07/2023    ALKPHOS 99 11/07/2023    TP 7.4 11/07/2023    ALB 4.3 11/07/2023    TBILI 0.56 11/07/2023    EGFR 122 11/07/2023     Drug Screen:   Lab Results   Component Value Date    AMPMETHUR Negative 11/01/2023    BARBTUR Negative 11/01/2023    BDZUR Negative 11/01/2023    THCUR Positive (A) 11/01/2023    COCAINEUR Negative 11/01/2023    METHADONEUR Negative 11/01/2023    OPIATEUR Negative 11/01/2023    PCPUR Negative 11/01/2023     EKG   Lab Results   Component Value Date    VENTRATE 69 11/05/2023    ATRIALRATE 69 11/05/2023    PRINT 160 11/05/2023    QRSDINT 94 11/05/2023    QTINT 402 11/05/2023    QTCINT 430 11/05/2023    PAXIS 53 11/05/2023    QRSAXIS 65 11/05/2023    TWAVEAXIS 36 11/05/2023       Counseling / Coordination of Care  Total floor / unit time spent today 30 minutes. Greater than 50% of total time was spent with the patient and / or family counseling and / or coordination of care.  A description of the counseling / coordination of care: Medication education, treatment plan, safety planning

## 2023-11-09 NOTE — PROGRESS NOTES
11/09/23   Team Meeting   Meeting Type Daily Rounds   Team Members Present   Team Members Present Physician;Nurse;   Physician Team Member Dr Chary Romero MD; Luci Odonnell [place]   Nursing Team Member [place]   Social Work Team Member Yudi Frances [place]   Patient/Family Present              Umair Whitfield (OMRBK)   Patient Present No   Patient's Family Present No     Compliant with Zyprexa, refused Depakote, trazodone not effective, wants to go into parents room with rock and bash them if he held them back from what he wanted to do. Poor insight, paranoid, agitated, psychotic, laughing to self inappropriately, bizarre.

## 2023-11-09 NOTE — NURSING NOTE
Patient visible in dayroom playing [employer]. Social with select peers. Patient bizarre but pleasant and cooperative. No inappropriate behaviors noted so far this shift. Refused Depakote but took Zyprexa without issues. PRN Trazodone not effective. Safety checks continue Q 7 minutes.

## 2023-11-09 NOTE — NURSING NOTE
Pt was visible and social on the unit. Pt was cooperative. Pt was irritable at times. Pt was medication compliant. Pt denies all psychiatric symptoms. Pt states, " I'm doing good, just chillin." Pt denies pain. Pt has no complaints at this time. Q 7 minute safety checks were maintained.

## 2023-11-09 NOTE — SOCIAL WORK
Contact attempted to Mayra Hewitt (Mother) [phone] who states pt has hx mental health issues for "a long time", stopped taking psych meds (time unknown), started decompensating about a month ago, told mother and psychiatrist/therapist that he did not want to be on medication, estimated time: stopped psych meds about 4-5wks ago, lost medical assistance after obtaining a job due to making too much money, was unable to continue working, went to [facility] [place] office for assistance in obtaining MA, confirmed pt does not have active coverage. At home, pt tries to provoke people to fight him due to a lot of rage, becomes disruptive, unmotivated, does not care for room, does not sleep, OCD like symptoms like repetitive behaviors, change clothes frequent, take multiple showers, become violent and aggressive with mother (will stab her, kill her, bash her head in), paranoid, think fbi is out to get him, VH, threatening to kill people around him. States psych meds work when pt takes him; previously diagnosed with ODD, Asperger, schizoaffective, bipolar 1. When stable, can return to family home with expectations of med compliance, controlling behaviors. Baseline: slightly irritable, angry, not very pleasant, manipulative, can be polite and charming. Pt smokes THC occasional, no other legal substances. No known legal issues, hx arrest for domestic violence. Pt lives with mother, brother age 30yo and mother's friend. Pt does not get along well with brother. States Zyprexa was effective previously. Has poor insight/judgement. Call ended mutually.

## 2023-11-09 NOTE — DISCHARGE INSTR - OTHER ORDERS
You are being discharged to your home located at [address] [place] [address] [place] [address]. Phone: [phone]. Triggers you have identified during your hospitalization that led to your admission of a distressed mood include medication noncompliance and family stressors. Coping skills you have identified during your hospitalization include music and talking to peers. If you are unable to deal with your distressed mood alone please contact Dee Dee Ryder (Mother) [phone]. If that is not effective and you continue to have are distressed mood, are overwhelmed, or in crisis, please contact (Crisis #) [facility] [phone], dial 911 or go to the nearest emergency center. BEHAVIORAL MEDICINE AT [facility] [place] Crisis Hotline: 1 724.412.5583  *National Suicide Prevention Lifeline:  0-912.504.4730  *Alcohol Anonymous: 408.115.5017  *[place] Drug & Alcohol Commission: (889) 129-6511  [address] on [address] [place] [address]) HELPLINE: 415.984.9440/Website: www.blanca.[employer]  *Substance Abuse and [facility]) [employer], which is a confidential, free, 24-hour-a-day, 365-day-a-year, information service for individuals and family members facing mental health and/or substance use disorders. This service provides referrals to local treatment facilities, support groups, and community-based organizations. Callers can also order free publications and other information. Call 5-816.658.5311/Website: www.[facility].gov  *[facility] 2-1-1: This is a toll free, confidential, 24-hour-a-day service which connects you to a community  in your area who can help you find services and resources that are available to you locally and provide critical services that can improve and save lives. Call: 80  /Website: https://francoiseSkipjumpmurrieta.net/     You declined a follow up primary care provider appointment and drug & alcohol treatment at this time.  A [employer] card provided for assistance with medication costs. Maury Maguire or Lata, our Werner and Ermias, will be calling you after your discharge, on the phone number that you provided. They will be available as an additional support, if needed. If you wish to speak with one of them, you may contact Maury Maguire at [phone] or Davide Sahni at [phone]     March 24, 2020   [address] [facility]   **[address] [facility]**   The [employer] food pantry will be open Mondays, Wednesdays, and Fridays from 12-1pm for a drive thru food distribution effective immediately. Additional Food Give-Aways:   Monday, Wednesday, and Friday from noon - 1 PM. Drive through on the gym side of the building. Also, for the older or more at risk, please contact us at [phone] and we will arrange a home delivery. [employer]. [employer],   [address] [employer] ([employer], community meals and several restaurants that are offering free food)    [employer] ([address] [facility] has issued a moratorium)    [employer] for Students     https://[employer].org/covid-19/   [address] [place] will be given out between 11:30-1pm at both high [facility] and [facility] every weekday , starting Monday, March 16. Meals are for anyone 25 and younger. [facility] is providing meals between 9am-12pm starting Tuesday, March 17 at both high schools and at [facility]. Bagged lunches and breakfast items will be provided for each child while supplies last. Families can drive-up, and food will be brought to the vehicles. They can visit any school regardless of which school their child attends. Visitors will not be allowed to enter the school buildings.    [address] [facility] [employer] and [employer] will be provided as information becomes available   [employer]   kwiry is providing free meals to those in need, with the help of the [facility] .  For Substance Abuse treatment Assessment:  [address]  [place] Treatment and Healing (PATH)  [address] [facility], [facility] [address] [place]  Phone: [phone], [address] [facility]  [address]  New Admissions [phone]  Local office [phone]

## 2023-11-09 NOTE — PROGRESS NOTES
Progress Note - Vahid Weeks 25 y.o. male MRN: [id number]    Unit/Bed#: Gaston Morin 222-01 Encounter: [id number]        Subjective:   Patient seen and examined at bedside after reviewing the chart and discussing the case with the caring staff. Patient has no acute complaints. Physical Exam   Vitals: Blood pressure 134/87, pulse 83, temperature 97.5 °F (36.4 °C), temperature source Temporal, resp. rate 18, height 5' 8" (1.727 m), weight 73.5 kg (162 lb), SpO2 100 %. ,Body mass index is 24.63 kg/m². Constitutional: Patient in no acute distress. HEENT: PERR, EOMI, MMM, neck supple. Cardiovascular: Normal rate. Pulmonary/Chest: No respiratory distress. Abdomen: Non distended. Assessment/Plan:  Vahid Weeks is a(n) 25y.o. year old male with mood disorder. 1.  Arthralgia/headache. Patient may get Tylenol as needed. 2.  Insomnia. Patient may take trazodone as needed. 3.  Prediabetes. Hgb A1c 5.7% on 11/7/23. Lifestyle modifications. 4.  Vitamin B12 deficiency. Patient started on vitamin B12 supplement. 5.  Vitamin D deficiency. Patient started on vitamin D2 23449 units weekly x 10 weeks followed by vitamin D3 1000 units daily. The patient was discussed with Dr. Tristen Huang and he is in agreement with the above note.

## 2023-11-09 NOTE — SOCIAL WORK
Stamped 303 results received from Marshal Leyva@"[employer] copy on pt chart, copy sent to scanning.

## 2023-11-10 PROCEDURE — 99232 SBSQ HOSP IP/OBS MODERATE 35: CPT | Performed by: HOSPITALIST

## 2023-11-10 RX ORDER — ECHINACEA PURPUREA EXTRACT 125 MG
2 TABLET ORAL
Status: DISCONTINUED | OUTPATIENT
Start: 2023-11-10 | End: 2023-11-28 | Stop reason: HOSPADM

## 2023-11-10 RX ORDER — OLANZAPINE 10 MG/1
10 TABLET, ORALLY DISINTEGRATING ORAL
Status: DISCONTINUED | OUTPATIENT
Start: 2023-11-10 | End: 2023-11-13

## 2023-11-10 RX ORDER — OLANZAPINE 10 MG/2ML
5 INJECTION, POWDER, FOR SOLUTION INTRAMUSCULAR
Status: DISCONTINUED | OUTPATIENT
Start: 2023-11-10 | End: 2023-11-13

## 2023-11-10 RX ADMIN — OLANZAPINE 10 MG: 10 TABLET, ORALLY DISINTEGRATING ORAL at 08:56

## 2023-11-10 RX ADMIN — CYANOCOBALAMIN TAB 500 MCG 1000 MCG: 500 TAB at 08:56

## 2023-11-10 RX ADMIN — OLANZAPINE 10 MG: 10 TABLET, ORALLY DISINTEGRATING ORAL at 21:25

## 2023-11-10 RX ADMIN — Medication 2 G: at 05:39

## 2023-11-10 NOTE — PROGRESS NOTES
Progress Note - Tracey Najjar 25 y.o. male MRN: [id number]    Unit/Bed#: [address] 222-01 Encounter: [id number]        Subjective:   Patient seen and examined at bedside after reviewing the chart and discussing the case with the caring staff. Patient has no acute complaints. Physical Exam   Vitals: Blood pressure 169/90, pulse 71, temperature 97.5 °F (36.4 °C), temperature source Temporal, resp. rate 18, height 5' 8" (1.727 m), weight 73.5 kg (162 lb), SpO2 97 %. ,Body mass index is 24.63 kg/m². Constitutional: Patient in no acute distress. HEENT: PERR, EOMI, MMM, neck supple. Cardiovascular: Normal rate, regular rhythm. Pulmonary/Chest: Lungs clear bilaterally. Abdomen: Non distended. Assessment/Plan:  Tracey Najjar is a(n) 25y.o. year old male with mood disorder. 1.  Arthralgia/headache. Patient may get Tylenol as needed. 2.  Insomnia. Patient may take trazodone as needed. 3.  Prediabetes. Hgb A1c 5.7% on 11/7/23. Lifestyle modifications. 4.  Vitamin B12 deficiency. Patient started on vitamin B12 supplement. 5.  Vitamin D deficiency. Patient started on vitamin D2 38687 units weekly x 10 weeks followed by vitamin D3 1000 units daily. The patient was discussed with Dr. Leonor Bajwa and he is in agreement with the above note.

## 2023-11-10 NOTE — NURSING NOTE
Patient visible in dayroom . Social with select peers. Patient bizarre but pleasant and cooperative. No inappropriate behaviors noted so far this shift. Took Zyprexa without issues. Trazodone 150 given for sleep at Patients request 2018. Safety checks continue Q 7 minutes.

## 2023-11-10 NOTE — PROGRESS NOTES
11/10/23    Team Meeting   Meeting Type Daily Rounds   Team Members Present   Team Members Present Physician;Nurse;   Physician Team Member Dr. Berkley Joaquin MD, [address] [facility]   Nursing Team Member Davey [place]   Social Work Team Member Clista Riedel, [place]   Patient/Family Present   Patient Present No   Patient's Family Present No     Zyprexa PO compliant, broken sleep, trazodone effective, move times of meds, social.

## 2023-11-10 NOTE — PLAN OF CARE
Problem: Ineffective Coping  Goal: Participates in unit activities  Description: Interventions:  - Provide therapeutic environment   - Provide required programming   - Redirect inappropriate behaviors   Outcome: Progressing     Problem: Risk for Violence/Aggression Toward Others  Goal: Refrain from harming others  Outcome: Progressing  Goal: Control angry outbursts  Description: Interventions:  - Monitor patient closely, per order  - Ensure early verbal de-escalation  - Monitor prn medication needs  - Set reasonable/therapeutic limits, outline behavioral expectations, and consequences   - Provide a non-threatening milieu, utilizing the least restrictive interventions   Outcome: Progressing

## 2023-11-10 NOTE — PROGRESS NOTES
Progress Note - [address] 25 y.o. male MRN: [id number]  Unit/Bed#: -01 Encounter: [id number]    Assessment/Plan   Principal Problem:    Mood disorder ([address]) r/o Bipolar D/o  Active Problems:    Autism spectrum disorder    Continuous cannabis use    History of oppositional defiant disorder      Behavior over the last 24 hours:  unchanged  Sleep: Intermittent  Appetite: normal  Medication side effects: No  ROS: no complaints and all other systems are negative    Zurdo Sayer remains visible in the milieu and continues to present with irritability. Thoughts are negative, illogical, and paranoid. Continues to appear intermittently preoccupied. Exhibits extremely low frustration tolerance. When discussing contact with his family, patient became increasingly agitated, posturing, and profane.   He then walked away from provider and stated "I would rather eat garbage and live on the streets then be here!"    Mental Status Evaluation:  Appearance:  Athletic build -American male, dyed hair, piercings, layered clothing   Behavior:  uncooperative and agitated   Speech:  loud and profane   Mood:  angry, irritable, and labile   Affect:  increased in intensity, labile, and mood-congruent   Thought Process:  illogical and perserverative   Associations: perseverative   Thought Content:  Paranoid, negative thinking, ruminating   Perceptual Disturbances: Appears preoccupied   Risk Potential: Suicidal Ideations none  Homicidal Ideations patient would not answer, verbalized extreme hostility toward family members  Potential for Aggression Yes due to low frustration tolerance, poor impulse control, mood lability, paranoia, and history of agitation, aggression, and assaultive behaviors   Sensorium:  person and place   Memory:  recent and remote memory: unable to assess due to lack of cooperation, patient does not answer   Consciousness:  alert and awake    Attention: Decreased attention/concentration Insight:  Impaired   Judgment: Poor   Gait/Station: normal gait/station and normal balance   Motor Activity: no abnormal movements     Progress Toward Goals: No improvement. Continues to present as irritable, angry, and paranoid. Preservative on discharge. Exhibits no reasoning ability and extremely low frustration tolerance. Tolerating titration of Zyprexa 10 mg BID well. Has had no violent behaviors on unit. Plan is to continue titration of Zyprexa for agitation, psychosis, and mood control. Continue utilization of PRN trazodone for sleep; if ineffective, consider adding PRN remeron for insomnia. No discharge date at this time. Recommended Treatment: Continue with group therapy, milieu therapy and occupational therapy. Risks, benefits and possible side effects of Medications:   Patient does not verbalize understanding at this time and will require further explanation.       Medications: all current active meds have been reviewed, continue current psychiatric medications, and current meds:   Current Facility-Administered Medications   Medication Dose Route Frequency    acetaminophen (TYLENOL) tablet 650 mg  650 mg Oral Q6H PRN    acetaminophen (TYLENOL) tablet 650 mg  650 mg Oral Q4H PRN    acetaminophen (TYLENOL) tablet 975 mg  975 mg Oral Q6H PRN    aluminum-magnesium hydroxide-simethicone (MAALOX) oral suspension 30 mL  30 mL Oral Q4H PRN    haloperidol lactate (HALDOL) injection 2.5 mg  2.5 mg Intramuscular Q6H PRN Max 4/day    And    LORazepam (ATIVAN) injection 1 mg  1 mg Intramuscular Q6H PRN Max 4/day    And    benztropine (COGENTIN) injection 0.5 mg  0.5 mg Intramuscular Q6H PRN Max 4/day    haloperidol lactate (HALDOL) injection 5 mg  5 mg Intramuscular Q4H PRN Max 4/day    And    LORazepam (ATIVAN) injection 2 mg  2 mg Intramuscular Q4H PRN Max 4/day    And    benztropine (COGENTIN) injection 1 mg  1 mg Intramuscular Q4H PRN Max 4/day    benztropine (COGENTIN) tablet 1 mg  1 mg Oral Q6H PRN    [START ON 1/12/2024] cholecalciferol (VITAMIN D3) tablet 1,000 Units  1,000 Units Oral Daily    cyanocobalamin (VITAMIN B-12) tablet 1,000 mcg  1,000 mcg Oral Daily    Diclofenac Sodium (VOLTAREN) 1 % topical gel 2 g  2 g Topical 4x Daily PRN    ergocalciferol (VITAMIN D2) capsule 50,000 Units  50,000 Units Oral Weekly    haloperidol (HALDOL) tablet 2 mg  2 mg Oral Q4H PRN Max 6/day    haloperidol (HALDOL) tablet 5 mg  5 mg Oral Q6H PRN Max 4/day    haloperidol (HALDOL) tablet 5 mg  5 mg Oral Q4H PRN Max 4/day    hydrOXYzine HCL (ATARAX) tablet 25 mg  25 mg Oral Q6H PRN Max 4/day    hydrOXYzine HCL (ATARAX) tablet 50 mg  50 mg Oral Q4H PRN Max 4/day    Or    LORazepam (ATIVAN) injection 1 mg  1 mg Intramuscular Q4H PRN    LORazepam (ATIVAN) tablet 1 mg  1 mg Oral Q8H PRN    Or    LORazepam (ATIVAN) injection 2 mg  2 mg Intramuscular Q8H PRN    OLANZapine (ZyPREXA ZYDIS) dispersible tablet 10 mg  10 mg Oral Daily    Or    OLANZapine (ZyPREXA) IM injection 5 mg  5 mg Intramuscular Daily    OLANZapine (ZyPREXA ZYDIS) dispersible tablet 10 mg  10 mg Oral HS    Or    OLANZapine (ZyPREXA) IM injection 5 mg  5 mg Intramuscular HS    polyethylene glycol (MIRALAX) packet 17 g  17 g Oral Daily PRN    sodium chloride (OCEAN) 0.65 % nasal spray 2 spray  2 spray Each Nare Q1H PRN    traZODone (DESYREL) tablet 150 mg  150 mg Oral HS PRN   . Labs: I have personally reviewed all pertinent laboratory/tests results.  CBC:   Lab Results   Component Value Date    WBC 6.43 11/07/2023    RBC 5.56 11/07/2023    HGB 16.0 11/07/2023    HCT 50.3 (H) 11/07/2023    MCV 91 11/07/2023     11/07/2023    MCH 28.8 11/07/2023    MCHC 31.8 11/07/2023    RDW 14.4 11/07/2023    NEUTROABS 3.50 11/07/2023     CMP:   Lab Results   Component Value Date    SODIUM 138 11/07/2023    K 3.5 11/07/2023     11/07/2023    CO2 31 11/07/2023    AGAP 6 11/07/2023    BUN 9 11/07/2023    CREATININE 0.87 11/07/2023    GLUC 81 11/07/2023    GLUF 81 11/07/2023    CALCIUM 9.6 11/07/2023    AST 27 11/07/2023    ALT 34 11/07/2023    ALKPHOS 99 11/07/2023    TP 7.4 11/07/2023    ALB 4.3 11/07/2023    TBILI 0.56 11/07/2023    EGFR 122 11/07/2023     Drug Screen:   Lab Results   Component Value Date    AMPMETHUR Negative 11/01/2023    BARBTUR Negative 11/01/2023    BDZUR Negative 11/01/2023    THCUR Positive (A) 11/01/2023    COCAINEUR Negative 11/01/2023    METHADONEUR Negative 11/01/2023    OPIATEUR Negative 11/01/2023    PCPUR Negative 11/01/2023     EKG   Lab Results   Component Value Date    VENTRATE 69 11/05/2023    ATRIALRATE 69 11/05/2023    PRINT 160 11/05/2023    QRSDINT 94 11/05/2023    QTINT 402 11/05/2023    QTCINT 430 11/05/2023    PAXIS 53 11/05/2023    QRSAXIS 65 11/05/2023    TWAVEAXIS 36 11/05/2023       Counseling / Coordination of Care  Total floor / unit time spent today 25 minutes. Greater than 50% of total time was spent with the patient and / or family counseling and / or coordination of care.  A description of the counseling / coordination of care: Medication education, treatment plan, safety planning

## 2023-11-10 NOTE — NUTRITION
11/10/23 1527   Current PO Intake   Current Diet Order Regular diet, thin liquids   Current Meal Intake 25-50%;50-75%;%   Estimated calorie intake compared to estimated need Meal completion varies % this admission, mostly 100%. Nutrient needs are met at present. Recommendations/Interventions   Malnutrition/BMI Present No  (one criteria met - weight loss, will continue to monitor)   Summary LOS. Past medical history significant for mood disorder, autism spectrum disorder, cannabis use. Weight history reviewed. Noted 78# weight loss in just over 1 year (32% body weight); significant 63# weight loss in 8 months (28% body weight), 58# weight loss in 7 months (26% body weight), 29# weight loss in 4 months (15% body weight). Per chart review pt symptomatic with nausea and vomiting, diagnosed with gastroenteritis at [address] [place] [address] 4/11/23, and 7/21/23. No edema. No pressure areas. Prescribed a Regular diet, thin liquids. Meal completion varies % this admission, mostly 100%. Nutrient needs are met at present. Per chart review from ED, "Pt reports sleeping aprox 3-4 hrs per day and reports appetite which varies. Pt states, 'I try all different types of diets so it depends how that goes. '" Per chart review pt seeking snacks at nurses station intermittently. Will continue to monitor PO intakes. Recommend obtain weights at least once weekly. RD to follow. Interventions/Recommendations Continue current diet order;Obtain current weight;Monitor I & O's   Education Assessment   Education Education not indicated at this time   Patient Nutrition Goals   Goal Wt maintained; Adequate intake

## 2023-11-11 PROCEDURE — 99232 SBSQ HOSP IP/OBS MODERATE 35: CPT

## 2023-11-11 RX ADMIN — OLANZAPINE 10 MG: 10 TABLET, ORALLY DISINTEGRATING ORAL at 08:54

## 2023-11-11 RX ADMIN — CYANOCOBALAMIN TAB 500 MCG 1000 MCG: 500 TAB at 08:54

## 2023-11-11 RX ADMIN — POLYETHYLENE GLYCOL 3350 17 G: 17 POWDER, FOR SOLUTION ORAL at 09:20

## 2023-11-11 RX ADMIN — OLANZAPINE 10 MG: 10 TABLET, ORALLY DISINTEGRATING ORAL at 20:59

## 2023-11-11 RX ADMIN — SALINE NASAL SPRAY 2 SPRAY: 1.5 SOLUTION NASAL at 21:21

## 2023-11-11 NOTE — NURSING NOTE
Pt was visible and social on the unit. Pt is cooperative with an irritable edge. Pt is medication compliant. Pt becomes irritable and agitated at times. Pt declines PRN medication and states he can calm himself down. Pt was able to deescalate. Q 7 minute safety checks were maintained.

## 2023-11-11 NOTE — NURSING NOTE
Patient visible in milieu. Out for meals and medications. Bright on approach. Calm and cooperative. Compliant with medication and mouth checks. No disturbed behavior noted. Denies Si/HI/AVH. Denies depression and anxiety. Q 7 minutes safety checks  maintained and continued.

## 2023-11-11 NOTE — PLAN OF CARE
Problem: Ineffective Coping  Goal: Participates in unit activities  Description: Interventions:  - Provide therapeutic environment   - Provide required programming   - Redirect inappropriate behaviors   Outcome: Progressing     Problem: Risk for Violence/Aggression Toward Others  Goal: Verbalize thoughts and feelings  Description: Interventions:  - Assess and re-assess patient's level of risk, every waking shift  - Engage patient in 1:1 interactions, daily, for a minimum of 15 minutes   - Allow patient to express feelings and frustrations in a safe and non-threatening manner   - Establish rapport/trust with patient   Outcome: Progressing  Goal: Refrain from harming others  Outcome: Progressing

## 2023-11-11 NOTE — PROGRESS NOTES
Progress Note - Gibran Thomson 25 y.o. male MRN: [id number]    Unit/Bed#: [facility]-01 Encounter: [id number]        Subjective:   Patient seen and examined at bedside after reviewing the chart and discussing the case with the caring staff. Patient has no acute complaints. Physical Exam   Vitals: Blood pressure 133/75, pulse 61, temperature 97.6 °F (36.4 °C), temperature source Temporal, resp. rate 16, height 5' 8" (1.727 m), weight 73.5 kg (162 lb), SpO2 99 %. ,Body mass index is 24.63 kg/m². Constitutional: Patient in no acute distress. HEENT: PERR, EOMI, MMM, neck supple. Cardiovascular: Normal rate, regular rhythm. Pulmonary/Chest: Lungs clear bilaterally. Abdomen: Non distended. Assessment/Plan:  Gibran Thomson is a(n) 25y.o. year old male with mood disorder. 1.  Arthralgia/headache. Patient may get Tylenol as needed. 2.  Insomnia. Patient may take trazodone as needed. 3.  Prediabetes. Hgb A1c 5.7% on 11/7/23. Lifestyle modifications. 4.  Vitamin B12 deficiency. Patient started on vitamin B12 supplement. 5.  Vitamin D deficiency. Patient started on vitamin D2 75763 units weekly x 10 weeks followed by vitamin D3 1000 units daily.

## 2023-11-11 NOTE — PROGRESS NOTES
Progress Note - [address] [facility] Michael urrutia male MRN: [id number]  Unit/Bed#: -01 Encounter: [id number]    Assessment/Plan   Principal Problem:    Mood disorder ([address]) r/o Bipolar D/o  Active Problems:    Autism spectrum disorder    Continuous cannabis use    History of oppositional defiant disorder      Behavior over the last 24 hours:  unchanged  Sleep: broken  Appetite: normal  Medication side effects: No  ROS: no complaints and all other systems are negative    Subjective: Elise Lu was seen today for psychiatric follow-up. Patient calm, cooperative. She is visible on the unit, social with peers. Patient appears less irritable today. Patient remains labile in mood with very low frustration tolerance. He appears paranoid and negative in thought. He endorses broken sleep. Patient denied any SI/HI/AVH. He did not appear internally preoccupied. Mental Status Evaluation:  Appearance:  age appropriate, casually dressed, and piercings   Behavior:  Calm, cooperative   Speech:  normal pitch and normal volume   Mood:  labile and irritable at times   Affect:  mood-congruent   Thought Process:  illogical and perserverative   Associations: perseveration   Thought Content:  Some paranoia, negative thinking   Perceptual Disturbances: Denied AVH, did not appear internally preoccupied   Risk Potential: Suicidal Ideations none at present  Homicidal Ideations none  Potential for Aggression No   Sensorium:  person and place   Memory:  recent and remote memory grossly intact   Consciousness:  alert and awake    Attention: Decreased attention/concentration   Insight:  impaired   Judgment: poor   Gait/Station: normal gait/station   Motor Activity: no abnormal movements     Progress Toward Goals: Unchanged. Patient remains labile and irritable at times. he is discharge preoccupied. Patient compliant with his current psychotropic medication regimen.  He denied side effects from current psychotropic medication regimen. No discharge date at this time. Recommended Treatment: Continue with group therapy, milieu therapy and occupational therapy. Risks, benefits and possible side effects of Medications:   Risks, benefits, and possible side effects of medications explained to patient and patient verbalizes understanding. Medications: all current active meds have been reviewed. Labs: I have personally reviewed all pertinent laboratory/tests results. Most Recent Labs:   Lab Results   Component Value Date    WBC 6.43 11/07/2023    RBC 5.56 11/07/2023    HGB 16.0 11/07/2023    HCT 50.3 (H) 11/07/2023     11/07/2023    RDW 14.4 11/07/2023    NEUTROABS 3.50 11/07/2023    SODIUM 138 11/07/2023    K 3.5 11/07/2023     11/07/2023    CO2 31 11/07/2023    BUN 9 11/07/2023    CREATININE 0.87 11/07/2023    GLUC 81 11/07/2023    GLUF 81 11/07/2023    CALCIUM 9.6 11/07/2023    AST 27 11/07/2023    ALT 34 11/07/2023    ALKPHOS 99 11/07/2023    TP 7.4 11/07/2023    ALB 4.3 11/07/2023    TBILI 0.56 11/07/2023    CHOLESTEROL 125 11/07/2023    HDL 43 11/07/2023    TRIG 82 11/07/2023    LDLCALC 66 11/07/2023    NONHDLC 82 11/07/2023    [id number] 1.331 11/07/2023    HGBA1C 5.7 (H) 11/07/2023     11/07/2023       Counseling / Coordination of Care  Total floor / unit time spent today 25 minutes.

## 2023-11-12 PROCEDURE — 99232 SBSQ HOSP IP/OBS MODERATE 35: CPT

## 2023-11-12 RX ADMIN — OLANZAPINE 10 MG: 10 TABLET, ORALLY DISINTEGRATING ORAL at 20:13

## 2023-11-12 RX ADMIN — SALINE NASAL SPRAY 2 SPRAY: 1.5 SOLUTION NASAL at 23:24

## 2023-11-12 RX ADMIN — CYANOCOBALAMIN TAB 500 MCG 1000 MCG: 500 TAB at 08:45

## 2023-11-12 RX ADMIN — OLANZAPINE 10 MG: 10 TABLET, ORALLY DISINTEGRATING ORAL at 08:45

## 2023-11-12 NOTE — PLAN OF CARE
Problem: Anxiety  Goal: Anxiety is at manageable level  Description: Interventions:  - Assess and monitor patient's anxiety level. - Monitor for signs and symptoms (heart palpitations, chest pain, shortness of breath, headaches, nausea, feeling jumpy, restlessness, irritable, apprehensive). - Collaborate with interdisciplinary team and initiate plan and interventions as ordered.   - Somis patient to unit/surroundings  - Explain treatment plan  - Encourage participation in care  - Encourage verbalization of concerns/fears  - Identify coping mechanisms  - Assist in developing anxiety-reducing skills  - Administer/offer alternative therapies  - Limit or eliminate stimulants  Outcome: Progressing     Problem: Risk for Violence/Aggression Toward Others  Goal: Refrain from harming others  Outcome: Progressing

## 2023-11-12 NOTE — PROGRESS NOTES
Progress Note - Abel Chaudhry 25 y.o. male MRN: [id number]    Unit/Bed#: [facility] 222-01 Encounter: [id number]        Subjective:   Patient seen and examined at bedside after reviewing the chart and discussing the case with the caring staff. Patient has no acute complaints. Physical Exam   Vitals: Blood pressure 141/82, pulse 74, temperature 97.5 °F (36.4 °C), temperature source Temporal, resp. rate 18, height 5' 8" (1.727 m), weight 73.5 kg (162 lb 0.6 oz), SpO2 97 %. ,Body mass index is 24.64 kg/m². Constitutional: Patient in no acute distress. HEENT: PERR, EOMI, MMM, neck supple. Cardiovascular: Normal rate, regular rhythm. Pulmonary/Chest: Lungs clear bilaterally. Abdomen: Non distended. Assessment/Plan:  Abel Chaudhry is a(n) 25y.o. year old male with mood disorder. 1.  Arthralgia/headache. Patient may get Tylenol as needed. 2.  Insomnia. Patient may take trazodone as needed. 3.  Prediabetes. Hgb A1c 5.7% on 11/7/23. Lifestyle modifications. 4.  Vitamin B12 deficiency. Patient started on vitamin B12 supplement. 5.  Vitamin D deficiency. Patient started on vitamin D2 85123 units weekly x 10 weeks followed by vitamin D3 1000 units daily.

## 2023-11-12 NOTE — NURSING NOTE
Patient visible in milieu. Out for meals and medications. Compliant with medications. Denies SI/HI/AVH. Paces the hallway. Social with select peers. Food seeking but was redirectable. Internally preoccupied. Q 7 minutes safety checks maintained and continued.

## 2023-11-12 NOTE — PROGRESS NOTES
Progress Note - [address] 25 y.o. male MRN: [id number]  Unit/Bed#: -01 Encounter: [id number]    Assessment/Plan   Principal Problem:    Mood disorder ([address]) r/o Bipolar D/o  Active Problems:    Autism spectrum disorder    Continuous cannabis use    History of oppositional defiant disorder      Behavior over the last 24 hours:  unchanged  Sleep: broken  Appetite: normal  Medication side effects: No  ROS: no complaints and all other systems are negative    Subjective: Ric Tsai was seen today for psychiatric follow-up. Patient calm, cooperative. He is visible on the unit, social with peers. Patient appears less irritable today. Patient remains labile in mood with very low frustration tolerance. He appears paranoid and negative in thought. He endorses poor sleep overnight and continues to refuse PRN medications. Patient denied any SI/HI/AVH. He did not appear internally preoccupied. Mental Status Evaluation:  Appearance:  age appropriate, casually dressed, and piercings   Behavior:  Calm, cooperative   Speech:  normal pitch and normal volume   Mood:  labile and irritable at times   Affect:  mood-congruent   Thought Process:  illogical and perserverative   Associations: perseveration   Thought Content:  Some paranoia, negative thinking   Perceptual Disturbances: Denied AVH, did not appear internally preoccupied   Risk Potential: Suicidal Ideations none at present  Homicidal Ideations none  Potential for Aggression No   Sensorium:  person and place   Memory:  recent and remote memory grossly intact   Consciousness:  alert and awake    Attention: Decreased attention/concentration   Insight:  impaired   Judgment: poor   Gait/Station: normal gait/station   Motor Activity: no abnormal movements     Progress Toward Goals: Unchanged. Patient remains labile and irritable in mood. He is discharge preoccupied. Patient compliant with his current psychotropic medication regimen.  He denied side effects from current psychotropic medication regimen. No discharge date at this time. Recommended Treatment: Continue with group therapy, milieu therapy and occupational therapy. Risks, benefits and possible side effects of Medications:   Risks, benefits, and possible side effects of medications explained to patient and patient verbalizes understanding. Medications: all current active meds have been reviewed. Labs: I have personally reviewed all pertinent laboratory/tests results. Most Recent Labs:   Lab Results   Component Value Date    WBC 6.43 11/07/2023    RBC 5.56 11/07/2023    HGB 16.0 11/07/2023    HCT 50.3 (H) 11/07/2023     11/07/2023    RDW 14.4 11/07/2023    NEUTROABS 3.50 11/07/2023    SODIUM 138 11/07/2023    K 3.5 11/07/2023     11/07/2023    CO2 31 11/07/2023    BUN 9 11/07/2023    CREATININE 0.87 11/07/2023    GLUC 81 11/07/2023    GLUF 81 11/07/2023    CALCIUM 9.6 11/07/2023    AST 27 11/07/2023    ALT 34 11/07/2023    ALKPHOS 99 11/07/2023    TP 7.4 11/07/2023    ALB 4.3 11/07/2023    TBILI 0.56 11/07/2023    CHOLESTEROL 125 11/07/2023    HDL 43 11/07/2023    TRIG 82 11/07/2023    LDLCALC 66 11/07/2023    NONHDLC 82 11/07/2023    [id number] 1.331 11/07/2023    HGBA1C 5.7 (H) 11/07/2023     11/07/2023       Counseling / Coordination of Care  Total floor / unit time spent today 25 minutes.

## 2023-11-12 NOTE — NURSING NOTE
Patient seen in milieu, socializing with peers. He continues to appear have a blunted affect, presents with irritable edge. Patient continues to be sarcastic and uninterested in assessment, denying any psychosis or depression/anxiety unrelated to admission. He is easily agitated when limits are set regarding appropriate behavior or when unit rules are upheld, however has been redirectable despite attempts to staff split. Insight and judgement remain limited. Compliant with PO zyprexa. Q7 minute monitoring ongoing.

## 2023-11-12 NOTE — NURSING NOTE
Patient observed walking the halls and responding to internal stimulus most of the night with minimal sleep. Reports this is normal sleep for him, refusing PRNs stating "I don't need that shit". Patient food seeking overnight, upset with limits set, coughing into the nurse's station in anger. Bizarre and internally preoccupied. Redirectable at this time. Q7 minute monitoring ongoing.

## 2023-11-13 PROCEDURE — 99232 SBSQ HOSP IP/OBS MODERATE 35: CPT | Performed by: STUDENT IN AN ORGANIZED HEALTH CARE EDUCATION/TRAINING PROGRAM

## 2023-11-13 RX ORDER — LANOLIN ALCOHOL/MO/W.PET/CERES
3 CREAM (GRAM) TOPICAL
Status: COMPLETED | OUTPATIENT
Start: 2023-11-13 | End: 2023-11-13

## 2023-11-13 RX ORDER — DIVALPROEX SODIUM 500 MG/1
500 TABLET, EXTENDED RELEASE ORAL 2 TIMES DAILY
Status: DISCONTINUED | OUTPATIENT
Start: 2023-11-13 | End: 2023-11-14

## 2023-11-13 RX ORDER — OLANZAPINE 10 MG/2ML
7.5 INJECTION, POWDER, FOR SOLUTION INTRAMUSCULAR 2 TIMES DAILY
Status: DISCONTINUED | OUTPATIENT
Start: 2023-11-13 | End: 2023-11-22

## 2023-11-13 RX ADMIN — Medication 2 G: at 05:28

## 2023-11-13 RX ADMIN — OLANZAPINE 15 MG: 5 TABLET, ORALLY DISINTEGRATING ORAL at 21:45

## 2023-11-13 RX ADMIN — Medication 2 G: at 01:41

## 2023-11-13 RX ADMIN — Medication 3 MG: at 21:45

## 2023-11-13 RX ADMIN — OLANZAPINE 10 MG: 10 TABLET, ORALLY DISINTEGRATING ORAL at 09:03

## 2023-11-13 RX ADMIN — CYANOCOBALAMIN TAB 500 MCG 1000 MCG: 500 TAB at 09:03

## 2023-11-13 NOTE — NURSING NOTE
Patient came up to the desk and states "I need my fucking saline spray". Reminded patient that aggression and cussing are unnecessary and inappropriate, states "well I don't respect you", given saline at this time, q7 minute monitoring ongoing.

## 2023-11-13 NOTE — PROGRESS NOTES
11/13/23   Team Meeting   Meeting Type Daily Rounds   Team Members Present   Team Members Present Physician;Nurse;   Physician Team Member Dr Jono Duke MD; Elian Aparicio, [address] [place] [address]   Nursing Team Member Siria zayas [place]   Social Work Team Member Fabiano [facility] [place]   Patient/Family Present   Patient Present No   Patient's Family Present No     Labile, demanding, internally preoccupied, poor sleep, food focused, poor insight, cursing at nursing, limit setting, bizarre movements like he is fighting someone/gun motions, social with select peers, med compliant, posturing.

## 2023-11-13 NOTE — SOCIAL WORK
Pt requested to speak to sw, presents irritable, agitated, pressured stating "I never said I would smash my mother's face in. I said I would knock her friend out which doesn't mean she would die. My mother made me dependent on her and now I have to pay for everything on my own". Pt has poor insight, ability to reason.

## 2023-11-13 NOTE — PROGRESS NOTES
Progress Note - Adrien Bennett 25 y.o. male MRN: [id number]    Unit/Bed#: [facility] 222-01 Encounter: [id number]        Subjective:   Patient seen and examined at bedside after reviewing the chart and discussing the case with the caring staff. Patient has no acute complaints. Physical Exam   Vitals: Blood pressure 141/78, pulse 73, temperature 97.6 °F (36.4 °C), temperature source Temporal, resp. rate 18, height 5' 8" (1.727 m), weight 73.5 kg (162 lb 0.6 oz), SpO2 99 %. ,Body mass index is 24.64 kg/m². Constitutional: Patient in no acute distress. HEENT: PERR, EOMI, MMM, neck supple. Cardiovascular: Normal rate, regular rhythm. Pulmonary/Chest: Lungs clear bilaterally. Abdomen: Non distended. Assessment/Plan:  Adrien Bennett is a(n) 25y.o. year old male with mood disorder. 1.  Arthralgia/headache. Patient may get Tylenol as needed. 2.  Insomnia. Patient may take trazodone as needed. 3.  Prediabetes. Hgb A1c 5.7% on 11/7/23. Lifestyle modifications. 4.  Vitamin B12 deficiency. Patient started on vitamin B12 supplement. 5.  Vitamin D deficiency. Patient started on vitamin D2 94700 units weekly x 10 weeks followed by vitamin D3 1000 units daily.

## 2023-11-13 NOTE — NURSING NOTE
Patient observed within the milieu, socializing with peers. Labile affect and mood. He continues to demonstrate poor frustration tolerance, often verbally aggressive when limits are set but less overt this evening. He continues to respond to internal stimulus when less peers are within the milieu. Continuous dismissive responses during assessment. Compliant with medication as ordered. No other needs identified at this time. Q7 minute monitoring ongoing.

## 2023-11-13 NOTE — NURSING NOTE
Patient observed having sporadic and poor sleep overnight. Non labored breathing observed during periods of rest. Patient continues to pace, responding to internal stimulus. Apologized to this writer. States "I'm fighting my own war". Reassurance provided. Will remain on safety precautions and continual monitoring.

## 2023-11-13 NOTE — NUTRITION
11/13/23 1321   Biochemical Data,Medical Tests, and Procedures   Biochemical Data/Medical Tests/Procedures Lab values reviewed; Meds reviewed   Labs (Comment) No new labs   Meds (Comment) Vit D, Vit B12, haldol, ativan, zyprexa   Nutrition-Focused Physical Exam   Nutrition-Focused Physical Exam Findings RN skin assessment reviewed; No skin issues documented   Medical-Related Concerns PMH reviewed   Adequacy of Intake   Nutrition Modality PO   Feeding Route   PO Independent   Current PO Intake   Current Diet Order Regular double protein thin liquids   Current Meal Intake %   Estimated calorie intake compared to estimated need Nutrient needs met. PES Statement   Problem Continue previous diagnosis   Recommendations/Interventions   Malnutrition/BMI Present No  (does not meet 2 criteria at this time.)   Summary Regular, double protein thin liquids. Meal completions 100%. 11/12 162#, BMI=24.64; 3# gain since admission. Patient has had a 79#(33%) weight loss in ~1 year since 10/20/#. Skin intact. Nutrition history obtained. Patient reports good appetite stating, "I'm hungry a lot". He states the reason for his weight loss was ~1 year ago he needed jaw surgery and his jaw was wired shut requiring a liquid diet. He states he lost nearly 50# during that time due to dislike for liquid diet. He states he now incorporates fasting into his diet. He also states he tries to be active. He reports he is happy with his current weight. Recommend change diet from double protein to double portions. Interventions/Recommendations Obtain current height;Monitor I & O's;Adjust diet order   Education Assessment   Education Education not indicated at this time   Patient Nutrition Goals   Goal Wt maintained; Adequate intake   Goal Status Met;Extended   Timeframe to complete goal by next f/u   Nutrition Complexity Risk   Nutrition complexity level Low risk   Nutrition review: 11/27/23   Follow up date 11/27/23

## 2023-11-13 NOTE — NURSING NOTE
Patient is visible pacing the unit at this time. Pleasant and cooperative upon interaction. Patient is compliant with medications. Mouth checks completed. Presents as bright and goal oriented. Patient states he wants to get out of this hostile environment. Denies all psychiatric symptoms and feel good. Upset about his breakfast menu stating it was messed up and meal orders reviewed with patient. No acute behaviors noted. Q 7 min safety checks maintained. Fall precautions maintained. Monitoring continues.

## 2023-11-13 NOTE — PLAN OF CARE
Problem: DISCHARGE PLANNING - CARE MANAGEMENT  Goal: Discharge to post-acute care or home with appropriate resources  Description: INTERVENTIONS:  - Conduct assessment to determine patient/family and health care team treatment goals, and need for post-acute services based on payer coverage, community resources, and patient preferences, and barriers to discharge  - Address psychosocial, clinical, and financial barriers to discharge as identified in assessment in conjunction with the patient/family and health care team  - Arrange appropriate level of post-acute services according to patient’s   needs and preference and payer coverage in collaboration with the physician and health care team  - Communicate with and update the patient/family, physician, and health care team regarding progress on the discharge plan  - Arrange appropriate transportation to post-acute venues  Outcome: Progressing     Pt progressing, no dc date, will dc home with OP psych

## 2023-11-14 PROCEDURE — 99232 SBSQ HOSP IP/OBS MODERATE 35: CPT | Performed by: STUDENT IN AN ORGANIZED HEALTH CARE EDUCATION/TRAINING PROGRAM

## 2023-11-14 RX ADMIN — Medication 2 G: at 03:14

## 2023-11-14 RX ADMIN — TRAZODONE HYDROCHLORIDE 150 MG: 150 TABLET ORAL at 21:35

## 2023-11-14 RX ADMIN — CYANOCOBALAMIN TAB 500 MCG 1000 MCG: 500 TAB at 10:20

## 2023-11-14 RX ADMIN — NICOTINE POLACRILEX 4 MG: 4 GUM, CHEWING BUCCAL at 15:11

## 2023-11-14 RX ADMIN — OLANZAPINE 15 MG: 5 TABLET, ORALLY DISINTEGRATING ORAL at 10:20

## 2023-11-14 RX ADMIN — OLANZAPINE 15 MG: 5 TABLET, ORALLY DISINTEGRATING ORAL at 21:35

## 2023-11-14 NOTE — NURSING NOTE
Patient less irritable with this writer this evening, more engaged in assessment tonight. Circumstantial in speech with some fleeting eye contact. He reports that he feels his diagnosis is incorrect, states he used to "lie to get high" when he was younger, reports a hx of stimulant abuse. In discussion of events leading to admission he reports that he would never try to harm his mother however has threatened his brother because he "deserves to get his ass kicked". Makes paranoid statements about providers trying to poison him with medications. Patient feels his anger is just and his mood is stable. States he "just shadow boxes" and has never experienced hallucinations. Patient does not understand reason for admission, or legal status. States "I was not involuntary, I submitted I put my hands behind my back". Patient continues to display restless and bizarre behavior when not socializing with peers. He is uninterested in Depakote and states "I'm allergic" cannot report reaction. Insight remains poor. Ongoing irritability and poor frustration tolerance expressed. Behavior has been redirectable. PRN melatonin ordered due to patient preference. Q7 minute monitoring ongoing.

## 2023-11-14 NOTE — NURSING NOTE
Patient was irritable and restless. Patient bizarre and observed making hand gestures toward staff and some peers. Staff support provided. Q 7 minute safety checks maintained. Patient denied SI/HI. Patient reported he wanted to be discharged immediately to his mothers house. Patient refused depakote. Patient only willing to take zyprexa and vitamins. Patient continue to be irritable and demanding with staff. Patient redirected for inappropriate behaviors. Staff will continue to monitor and support.

## 2023-11-14 NOTE — PROGRESS NOTES
Progress Note - [address] 25 y.o. male MRN: [id number]  Unit/Bed#: [facility] 222-01 Encounter: [id number]    Assessment/Plan   Principal Problem:    Mood disorder ([address]) r/o Bipolar D/o  Active Problems:    Autism spectrum disorder    Continuous cannabis use    History of oppositional defiant disorder      Behavior over the last 24 hours:  regressed  Sleep: normal  Appetite: normal  Medication side effects: No  ROS: all other systems are negative    Subjective: Patient was seen and examined today at the bedside during morning rounds. Patient is preoccupied by discharge and unhappy being in the hospital however he admits that is better than being in senior care. He continued to have difficulty controlling his verbal threats and he threatened today to staff members verbally. He seems to be irritable and easily provoked dealing with women in general as he does not show this reactions to men. Mental Status Evaluation:  Appearance:  disheveled   Behavior:  psychomotor agitation and restless and fidgety   Speech:  pressured   Mood:  angry, irritable, and labile   Affect:  inappropriate, increased in intensity, and increased in range   Thought Process:  disorganized   Associations: flight of ideas   Thought Content:  Ruminations   Perceptual Disturbances: None   Risk Potential: Suicidal Ideations none  Homicidal Ideations without plan  Potential for Aggression Yes due to verbal threats   Sensorium:  person and place   Memory:  recent and remote memory grossly intact   Consciousness:  alert and awake    Attention: attention span appeared shorter than expected for age   Insight:  limited   Judgment: limited   Gait/Station: normal gait/station   Motor Activity: no abnormal movements     Progress Toward Goals: Working on anger management and impulse control. We will increase Zyprexa to 15 mg twice a day and possibly adding mood stabilizer.     Recommended Treatment: Continue with group therapy, milieu therapy and occupational therapy. Risks, benefits and possible side effects of Medications:   Risks, benefits, and possible side effects of medications explained to patient and patient verbalizes understanding.       Medications: all current active meds have been reviewed, continue current psychiatric medications, and current meds:   Current Facility-Administered Medications   Medication Dose Route Frequency    acetaminophen (TYLENOL) tablet 650 mg  650 mg Oral Q6H PRN    acetaminophen (TYLENOL) tablet 650 mg  650 mg Oral Q4H PRN    acetaminophen (TYLENOL) tablet 975 mg  975 mg Oral Q6H PRN    aluminum-magnesium hydroxide-simethicone (MAALOX) oral suspension 30 mL  30 mL Oral Q4H PRN    haloperidol lactate (HALDOL) injection 2.5 mg  2.5 mg Intramuscular Q6H PRN Max 4/day    And    LORazepam (ATIVAN) injection 1 mg  1 mg Intramuscular Q6H PRN Max 4/day    And    benztropine (COGENTIN) injection 0.5 mg  0.5 mg Intramuscular Q6H PRN Max 4/day    haloperidol lactate (HALDOL) injection 5 mg  5 mg Intramuscular Q4H PRN Max 4/day    And    LORazepam (ATIVAN) injection 2 mg  2 mg Intramuscular Q4H PRN Max 4/day    And    benztropine (COGENTIN) injection 1 mg  1 mg Intramuscular Q4H PRN Max 4/day    benztropine (COGENTIN) tablet 1 mg  1 mg Oral Q6H PRN    [START ON 1/12/2024] cholecalciferol (VITAMIN D3) tablet 1,000 Units  1,000 Units Oral Daily    cyanocobalamin (VITAMIN B-12) tablet 1,000 mcg  1,000 mcg Oral Daily    Diclofenac Sodium (VOLTAREN) 1 % topical gel 2 g  2 g Topical 4x Daily PRN    divalproex sodium (DEPAKOTE ER) 24 hr tablet 500 mg  500 mg Oral BID    ergocalciferol (VITAMIN D2) capsule 50,000 Units  50,000 Units Oral Weekly    haloperidol (HALDOL) tablet 2 mg  2 mg Oral Q4H PRN Max 6/day    haloperidol (HALDOL) tablet 5 mg  5 mg Oral Q6H PRN Max 4/day    haloperidol (HALDOL) tablet 5 mg  5 mg Oral Q4H PRN Max 4/day    hydrOXYzine HCL (ATARAX) tablet 25 mg  25 mg Oral Q6H PRN Max 4/day    hydrOXYzine HCL (ATARAX) tablet 50 mg  50 mg Oral Q4H PRN Max 4/day    Or    LORazepam (ATIVAN) injection 1 mg  1 mg Intramuscular Q4H PRN    LORazepam (ATIVAN) tablet 1 mg  1 mg Oral Q8H PRN    Or    LORazepam (ATIVAN) injection 2 mg  2 mg Intramuscular Q8H PRN    melatonin tablet 3 mg  3 mg Oral Once HS    OLANZapine (ZyPREXA ZYDIS) dispersible tablet 15 mg  15 mg Oral BID    Or    OLANZapine (ZyPREXA) IM injection 7.5 mg  7.5 mg Intramuscular BID    polyethylene glycol (MIRALAX) packet 17 g  17 g Oral Daily PRN    sodium chloride (OCEAN) 0.65 % nasal spray 2 spray  2 spray Each Nare Q1H PRN    traZODone (DESYREL) tablet 150 mg  150 mg Oral HS PRN   . Labs: I have personally reviewed all pertinent laboratory/tests results. Most Recent Labs:   Lab Results   Component Value Date    WBC 6.43 11/07/2023    RBC 5.56 11/07/2023    HGB 16.0 11/07/2023    HCT 50.3 (H) 11/07/2023     11/07/2023    RDW 14.4 11/07/2023    NEUTROABS 3.50 11/07/2023    SODIUM 138 11/07/2023    K 3.5 11/07/2023     11/07/2023    CO2 31 11/07/2023    BUN 9 11/07/2023    CREATININE 0.87 11/07/2023    GLUC 81 11/07/2023    GLUF 81 11/07/2023    CALCIUM 9.6 11/07/2023    AST 27 11/07/2023    ALT 34 11/07/2023    ALKPHOS 99 11/07/2023    TP 7.4 11/07/2023    ALB 4.3 11/07/2023    TBILI 0.56 11/07/2023    CHOLESTEROL 125 11/07/2023    HDL 43 11/07/2023    TRIG 82 11/07/2023    LDLCALC 66 11/07/2023    NONHDLC 82 11/07/2023    [id number] 1.331 11/07/2023    HGBA1C 5.7 (H) 11/07/2023     11/07/2023       Counseling / Coordination of Care  Total floor / unit time spent today 25 minutes. Greater than 50% of total time was spent with the patient and / or family counseling and / or coordination of care.  A description of the counseling / coordination of care: Discussion of patient case with treatment team Detail Level: Detailed

## 2023-11-14 NOTE — PROGRESS NOTES
Progress Note - Jade Tello 25 y.o. male MRN: [id number]    Unit/Bed#: [facility] 222-01 Encounter: [id number]        Subjective:   Patient seen and examined at bedside after reviewing the chart and discussing the case with the caring staff. Patient examined at bedside. Patient is requesting nicotine gum as he has history of tobacco abuse. Physical Exam   Vitals: Blood pressure 136/73, pulse 77, temperature 97.5 °F (36.4 °C), temperature source Temporal, resp. rate 17, height 5' 8" (1.727 m), weight 73.5 kg (162 lb 0.6 oz), SpO2 99 %. ,Body mass index is 24.64 kg/m². Constitutional: Patient in no acute distress. HEENT: PERR, EOMI, MMM, neck supple. Cardiovascular: Normal rate, regular rhythm. Pulmonary/Chest: Lungs clear bilaterally. Abdomen: Non distended. Assessment/Plan:  Jade Tello is a(n) 25y.o. year old male with mood disorder. 1.  Arthralgia/headache. Patient may get Tylenol as needed. 2.  Insomnia. Patient may take trazodone as needed. 3.  Prediabetes. Hgb A1c 5.7% on 11/7/23. Lifestyle modifications. 4.  Vitamin B12 deficiency. Patient started on vitamin B12 supplement. 5.  Vitamin D deficiency. Patient started on vitamin D2 39578 units weekly x 10 weeks followed by vitamin D3 1000 units daily. 6.  Tobacco abuse. Patient has been put on nicotine gum as needed.

## 2023-11-14 NOTE — PROGRESS NOTES
11/14/23   Team Meeting   Meeting Type Daily Rounds   Team Members Present   Team Members Present Physician;Nurse;   Physician Team Member Dr Karolina Zapata MD; Mey Babin, [address] [facility] Team Member [address]   Social Work Team Member Arsalan WaggonerGibson, [place]   Patient/Family Present   Patient Present No   Patient's Family Present No     Increased zyprexa 30mg, verbally threatening staff, agitated, escalated, not able to redirect easily

## 2023-11-14 NOTE — NURSING NOTE
Patient now observed resting without signs or symptoms of distress noted. Patient had disturbed sleep overnight. However slept more than 2 previous shifts, around 3-4 hrs. Non-labored breathing observed Will remain on safety precautions and continual monitoring.

## 2023-11-14 NOTE — SOCIAL WORK
Brandie Butler (Mother) [phone] contacted for weekly family check in, progress update provided. Louis Favors states pt cannot return home unless stabilization, compliant with medications. Education provided on dispo options which include returning home with [facility] follow up, [address] [facility] Call ended mutually.

## 2023-11-15 PROCEDURE — 99232 SBSQ HOSP IP/OBS MODERATE 35: CPT | Performed by: STUDENT IN AN ORGANIZED HEALTH CARE EDUCATION/TRAINING PROGRAM

## 2023-11-15 RX ORDER — CHLORPROMAZINE HYDROCHLORIDE 100 MG/1
100 TABLET, FILM COATED ORAL 2 TIMES DAILY
Status: DISCONTINUED | OUTPATIENT
Start: 2023-11-16 | End: 2023-11-20

## 2023-11-15 RX ORDER — CHLORPROMAZINE HYDROCHLORIDE 25 MG/ML
50 INJECTION INTRAMUSCULAR 2 TIMES DAILY
Status: DISCONTINUED | OUTPATIENT
Start: 2023-11-16 | End: 2023-11-20

## 2023-11-15 RX ADMIN — OLANZAPINE 15 MG: 5 TABLET, ORALLY DISINTEGRATING ORAL at 20:54

## 2023-11-15 RX ADMIN — CYANOCOBALAMIN TAB 500 MCG 1000 MCG: 500 TAB at 08:39

## 2023-11-15 RX ADMIN — OLANZAPINE 15 MG: 5 TABLET, ORALLY DISINTEGRATING ORAL at 08:39

## 2023-11-15 NOTE — SOCIAL WORK
Sw, attending, NP, RN met with pt in group room to review unit expectations, med compliance, dc expectations. Pt presents irritable,agitated, guarded, verbally threatening at times. Denies SI, but acknowledges wanting to beat up brother, peer on unit.

## 2023-11-15 NOTE — NURSING NOTE
Met with patient in [facility] at his request. Patient expressed frustration with not having his glasses. This writer encouraged him to call his mother to ask her to bring them which he did. Patient also was frustrated with the 304 commitment and lack of communication with his mother from the treatment team. This writer explained that the 304 is "up to" 90 days and that communication with his mother is done on an agreed upon weekly basis. Patient demanded that he be transferred to [facility]. This writer explained that was not an option at this time due to his 56 commitment. This writer asked if there was any thing that could be done at this moment to help with his frustrations. He stated no and walked away. Patient had elevated ,pressured ,tangential speech, could not stand still, and had poor insight.

## 2023-11-15 NOTE — NURSING NOTE
Patient visible on unit. Social with select peers. Patient calm and cooperative, Medication compliant. Denies SI,HI,AVH, anxiety or depression. Trazodone 150 mg given at 2135. Safety checks continue Q 7 minutes.

## 2023-11-15 NOTE — PROGRESS NOTES
Progress Note - [address] 25 y.o. male MRN: [id number]  Unit/Bed#: [facility] 222-01 Encounter: [id number]    Assessment/Plan   Principal Problem:    Mood disorder ([address]) r/o Bipolar D/o  Active Problems:    Autism spectrum disorder    Continuous cannabis use    History of oppositional defiant disorder      Behavior over the last 24 hours:  regressed  Sleep: Decreased sleeping hours  Appetite: normal  Medication side effects: No  ROS: all other systems are negative    Subjective: Patient did not agree to speak with the provider today and refused to have conversation about his treatment unless we are talking about his discharge. He continued to be agitated and making gestures in the air to other patients and staff members. At some point he mentioned that the doctors are retarded. He does not believe he should be in the hospital and believes that medications are poisoning him.   He declined his Depakote yesterday and would wait to discuss with patient alternatives when he is more calm    Mental Status Evaluation:  Appearance:  disheveled   Behavior:  psychomotor agitation and restless and fidgety   Speech:  pressured   Mood:  angry, irritable, and labile   Affect:  inappropriate, increased in intensity, and increased in range   Thought Process:  disorganized   Associations: flight of ideas   Thought Content:  Ruminations   Perceptual Disturbances: None   Risk Potential: Suicidal Ideations none  Homicidal Ideations without plan  Potential for Aggression Yes due to verbal threats   Sensorium:  person and place   Memory:  recent and remote memory grossly intact   Consciousness:  alert and awake    Attention: attention span appeared shorter than expected for age   Insight:  limited   Judgment: limited   Gait/Station: normal gait/station   Motor Activity: no abnormal movements     Progress Toward Goals: Continue Zyprexa 15 mg twice a day and possibly adding mood stabilizer    Recommended Treatment: Continue with group therapy, milieu therapy and occupational therapy. Risks, benefits and possible side effects of Medications:   Patient does not verbalize understanding at this time and will require further explanation.       Medications: all current active meds have been reviewed, continue current psychiatric medications, and current meds:   Current Facility-Administered Medications   Medication Dose Route Frequency    acetaminophen (TYLENOL) tablet 650 mg  650 mg Oral Q6H PRN    acetaminophen (TYLENOL) tablet 650 mg  650 mg Oral Q4H PRN    acetaminophen (TYLENOL) tablet 975 mg  975 mg Oral Q6H PRN    aluminum-magnesium hydroxide-simethicone (MAALOX) oral suspension 30 mL  30 mL Oral Q4H PRN    haloperidol lactate (HALDOL) injection 2.5 mg  2.5 mg Intramuscular Q6H PRN Max 4/day    And    LORazepam (ATIVAN) injection 1 mg  1 mg Intramuscular Q6H PRN Max 4/day    And    benztropine (COGENTIN) injection 0.5 mg  0.5 mg Intramuscular Q6H PRN Max 4/day    haloperidol lactate (HALDOL) injection 5 mg  5 mg Intramuscular Q4H PRN Max 4/day    And    LORazepam (ATIVAN) injection 2 mg  2 mg Intramuscular Q4H PRN Max 4/day    And    benztropine (COGENTIN) injection 1 mg  1 mg Intramuscular Q4H PRN Max 4/day    benztropine (COGENTIN) tablet 1 mg  1 mg Oral Q6H PRN    [START ON 1/12/2024] cholecalciferol (VITAMIN D3) tablet 1,000 Units  1,000 Units Oral Daily    cyanocobalamin (VITAMIN B-12) tablet 1,000 mcg  1,000 mcg Oral Daily    Diclofenac Sodium (VOLTAREN) 1 % topical gel 2 g  2 g Topical 4x Daily PRN    ergocalciferol (VITAMIN D2) capsule 50,000 Units  50,000 Units Oral Weekly    haloperidol (HALDOL) tablet 2 mg  2 mg Oral Q4H PRN Max 6/day    haloperidol (HALDOL) tablet 5 mg  5 mg Oral Q6H PRN Max 4/day    haloperidol (HALDOL) tablet 5 mg  5 mg Oral Q4H PRN Max 4/day    hydrOXYzine HCL (ATARAX) tablet 25 mg  25 mg Oral Q6H PRN Max 4/day    hydrOXYzine HCL (ATARAX) tablet 50 mg  50 mg Oral Q4H PRN Max 4/day    Or LORazepam (ATIVAN) injection 1 mg  1 mg Intramuscular Q4H PRN    LORazepam (ATIVAN) tablet 1 mg  1 mg Oral Q8H PRN    Or    LORazepam (ATIVAN) injection 2 mg  2 mg Intramuscular Q8H PRN    nicotine polacrilex (NICORETTE) gum 4 mg  4 mg Oral Q2H PRN    OLANZapine (ZyPREXA ZYDIS) dispersible tablet 15 mg  15 mg Oral BID    Or    OLANZapine (ZyPREXA) IM injection 7.5 mg  7.5 mg Intramuscular BID    polyethylene glycol (MIRALAX) packet 17 g  17 g Oral Daily PRN    sodium chloride (OCEAN) 0.65 % nasal spray 2 spray  2 spray Each Nare Q1H PRN    traZODone (DESYREL) tablet 150 mg  150 mg Oral HS PRN   . Labs: I have personally reviewed all pertinent laboratory/tests results. Most Recent Labs:   Lab Results   Component Value Date    WBC 6.43 11/07/2023    RBC 5.56 11/07/2023    HGB 16.0 11/07/2023    HCT 50.3 (H) 11/07/2023     11/07/2023    RDW 14.4 11/07/2023    NEUTROABS 3.50 11/07/2023    SODIUM 138 11/07/2023    K 3.5 11/07/2023     11/07/2023    CO2 31 11/07/2023    BUN 9 11/07/2023    CREATININE 0.87 11/07/2023    GLUC 81 11/07/2023    GLUF 81 11/07/2023    CALCIUM 9.6 11/07/2023    AST 27 11/07/2023    ALT 34 11/07/2023    ALKPHOS 99 11/07/2023    TP 7.4 11/07/2023    ALB 4.3 11/07/2023    TBILI 0.56 11/07/2023    CHOLESTEROL 125 11/07/2023    HDL 43 11/07/2023    TRIG 82 11/07/2023    LDLCALC 66 11/07/2023    NONHDLC 82 11/07/2023    [id number] 1.331 11/07/2023    HGBA1C 5.7 (H) 11/07/2023     11/07/2023       Counseling / Coordination of Care  Total floor / unit time spent today 15 minutes. Greater than 50% of total time was spent with the patient and / or family counseling and / or coordination of care.  A description of the counseling / coordination of care: Discussion of patient case with treatment team

## 2023-11-15 NOTE — NURSING NOTE
Patient was irritable, restless, and demanding. Patient easily agitated and focused on discharge. Staff support provided. Q 7 minute safety checks maintained. Patient denied SI/HI. Patient overheard making threats toward anyone that is keeping him here. Patient belligerent at times with staff. Patient took zyprexa this morning. Staff will continue to monitor and support.

## 2023-11-15 NOTE — NURSING NOTE
Patient awake most the night. Pacing in hallways, internally pre occupied. Making air movements, dancing around. Will monitor.

## 2023-11-15 NOTE — PLAN OF CARE
Problem: Risk for Violence/Aggression Toward Others  Goal: Refrain from harming others  Outcome: Progressing     Problem: Ineffective Coping  Goal: Participates in unit activities  Description: Interventions:  - Provide therapeutic environment   - Provide required programming   - Redirect inappropriate behaviors   Outcome: Progressing

## 2023-11-15 NOTE — PROGRESS NOTES
Progress Note - Mansoor Kilpatrick 25 y.o. male MRN: [id number]    Unit/Bed#: [facility] 222-01 Encounter: [id number]        Subjective:   Patient seen and examined at bedside after reviewing the chart and discussing the case with the caring staff. Patient examined at bedside. Patient reports no acute symptoms. Physical Exam   Vitals: Blood pressure 131/87, pulse 92, temperature 97.7 °F (36.5 °C), temperature source Temporal, resp. rate 18, height 5' 8" (1.727 m), weight 73.5 kg (162 lb 0.6 oz), SpO2 99 %. ,Body mass index is 24.64 kg/m². Constitutional: Patient in no acute distress. HEENT: PERR, EOMI, MMM, neck supple. Cardiovascular: Normal rate, regular rhythm. Pulmonary/Chest: Lungs clear bilaterally. Abdomen: Non distended. Assessment/Plan:  Mansoor Kilpatrick is a(n) 25y.o. year old male with mood disorder. 1.  Arthralgia/headache. Patient may get Tylenol as needed. 2.  Insomnia. Patient may take trazodone as needed. 3.  Prediabetes. Hgb A1c 5.7% on 11/7/23. Lifestyle modifications. 4.  Vitamin B12 deficiency. Patient started on vitamin B12 supplement. 5.  Vitamin D deficiency. Patient started on vitamin D2 79459 units weekly x 10 weeks followed by vitamin D3 1000 units daily. 6.  Tobacco abuse. Patient has been put on nicotine gum as needed.

## 2023-11-15 NOTE — PROGRESS NOTES
11/15/23   Team Meeting   Meeting Type Daily Rounds   Team Members Present   Team Members Present Physician;Nurse;   Physician Team Member Dr Toro Baca MD; Flory Lo, [address] [facility]   Nursing Team Member Bandar Davalos, [address]   Social Work Team Member [place]h [place], [place]   Patient/Family Present   Patient Present No   Patient's Family Present No     Pacing, internally preoccupied, mother requesting provider call-wants pt on lithium, cannot return home until stable, irritable, agitated, making "air fun shooting" gestures, uncooperative, more pleasant/appropriate today, bizarre, inconsistent behaviors, today-verbal altercation with peer.

## 2023-11-15 NOTE — SOCIAL WORK
304 submitted via secure email to Commitment [email or web address], pt understands minimally about his rights but acknowledges there will be a hearing and  involved. During reviewing of rights, pt put gun motion to his head, told isabella "this is your karma. I hope you are safe on the streets bitch". Sw redirected pt to stop making verbal threats and go to room, pt walked away.

## 2023-11-16 PROCEDURE — 99232 SBSQ HOSP IP/OBS MODERATE 35: CPT | Performed by: STUDENT IN AN ORGANIZED HEALTH CARE EDUCATION/TRAINING PROGRAM

## 2023-11-16 PROCEDURE — 93005 ELECTROCARDIOGRAM TRACING: CPT

## 2023-11-16 RX ADMIN — ERGOCALCIFEROL 50000 UNITS: 1.25 CAPSULE, LIQUID FILLED ORAL at 09:02

## 2023-11-16 RX ADMIN — TRAZODONE HYDROCHLORIDE 150 MG: 150 TABLET ORAL at 20:38

## 2023-11-16 RX ADMIN — CHLORPROMAZINE HYDROCHLORIDE 100 MG: 100 TABLET, FILM COATED ORAL at 09:03

## 2023-11-16 RX ADMIN — CYANOCOBALAMIN TAB 500 MCG 1000 MCG: 500 TAB at 09:03

## 2023-11-16 RX ADMIN — CHLORPROMAZINE HYDROCHLORIDE 100 MG: 100 TABLET, FILM COATED ORAL at 17:05

## 2023-11-16 RX ADMIN — OLANZAPINE 15 MG: 5 TABLET, ORALLY DISINTEGRATING ORAL at 20:37

## 2023-11-16 RX ADMIN — OLANZAPINE 15 MG: 5 TABLET, ORALLY DISINTEGRATING ORAL at 09:03

## 2023-11-16 NOTE — SOCIAL WORK
Sarahy Nova number [phone] provided to pt per pt request. Pt states this number connected him to [employer] when he called it earlier, declined to take phone number from .

## 2023-11-16 NOTE — NURSING NOTE
Patient was irritable, demanding, belligerent and disorganized. Patient was verbally threatening when offered his morning medications. Patient eventually calmed down and was compliant with medications. Staff support provided. Q 7 minute safety checks maintained. Patient was compliant with medications and groups. Patient remains labile with rapid mood shifts. Staff will continue to monitor and support.

## 2023-11-16 NOTE — NURSING NOTE
Patient visible on unit. Pleasant and cooperative. Denies SI,HI,AVH, anxiety and depression. No behaviors noted. Appears internally pre occupied. In bed sleeping by 9 pm. Safety checks continue Q 7 minutes.

## 2023-11-16 NOTE — PROGRESS NOTES
Progress Note - Jade Tello 25 y.o. male MRN: [id number]    Unit/Bed#: [facility] 222-01 Encounter: [id number]        Subjective:   Patient seen and examined at bedside after reviewing the chart and discussing the case with the caring staff. Patient reports no acute symptoms. Physical Exam   Vitals: Blood pressure 138/99, pulse 87, temperature 97.6 °F (36.4 °C), temperature source Temporal, resp. rate 18, height 5' 8" (1.727 m), weight 73.5 kg (162 lb 0.6 oz), SpO2 98 %. ,Body mass index is 24.64 kg/m². Constitutional: Patient in no acute distress. HEENT: PERR, EOMI, MMM, neck supple. Cardiovascular: Normal rate, regular rhythm. Pulmonary/Chest: Lungs clear bilaterally. Abdomen: Non distended. Assessment/Plan:  Jade Tello is a(n) 25y.o. year old male with mood disorder. 1.  Arthralgia/headache. Patient may get Tylenol as needed. 2.  Insomnia. Patient may take trazodone as needed. 3.  Prediabetes. Hgb A1c 5.7% on 11/7/23. Lifestyle modifications. 4.  Vitamin B12 deficiency. Patient started on vitamin B12 supplement. 5.  Vitamin D deficiency. Patient started on vitamin D2 24110 units weekly x 10 weeks followed by vitamin D3 1000 units daily. 6.  Tobacco abuse. Patient may request nicotine gum as needed. The patient was discussed with Dr. Ramona Jansen and he is in agreement with the above note.

## 2023-11-16 NOTE — PROGRESS NOTES
Progress Note - [address] 25 y.o. male MRN: [id number]  Unit/Bed#: [facility] 222-01 Encounter: [id number]    Assessment/Plan   Principal Problem:    Mood disorder ([address]) r/o Bipolar D/o  Active Problems:    Autism spectrum disorder    Continuous cannabis use    History of oppositional defiant disorder      Behavior over the last 24 hours:  regressed  Sleep: Decreased sleeping hours  Appetite: normal  Medication side effects: No  ROS: all other systems are negative    Subjective: Patient was seen today during morning rounds. Patient continues to be irritable and disorganized. Patient was refusing Thorazine in the morning however later he accepted to take the medication. He continued to deny his mental illness and need for treatment. He continued to be disrespectful to the staff members and continued to have verbal threats and inappropriate behavior    Mental Status Evaluation:  Appearance:  disheveled   Behavior:  psychomotor agitation and restless and fidgety   Speech:  pressured   Mood:  angry, irritable, and labile   Affect:  inappropriate, increased in intensity, and increased in range   Thought Process:  disorganized   Associations: flight of ideas   Thought Content:  Ruminations   Perceptual Disturbances: None   Risk Potential: Suicidal Ideations none  Homicidal Ideations without plan  Potential for Aggression Yes due to verbal threats   Sensorium:  person and place   Memory:  recent and remote memory grossly intact   Consciousness:  alert and awake    Attention: attention span appeared shorter than expected for age   Insight:  limited   Judgment: limited   Gait/Station: normal gait/station   Motor Activity: no abnormal movements     Progress Toward Goals: Continue Zyprexa 15 mg twice a day and add Thorazine 100 mg twice a day. 304 commitment petition was submitted    Recommended Treatment: Continue with group therapy, milieu therapy and occupational therapy.       Risks, benefits and possible side effects of Medications:   Patient does not verbalize understanding at this time and will require further explanation.       Medications: all current active meds have been reviewed, continue current psychiatric medications, and current meds:   Current Facility-Administered Medications   Medication Dose Route Frequency    acetaminophen (TYLENOL) tablet 650 mg  650 mg Oral Q6H PRN    acetaminophen (TYLENOL) tablet 650 mg  650 mg Oral Q4H PRN    acetaminophen (TYLENOL) tablet 975 mg  975 mg Oral Q6H PRN    aluminum-magnesium hydroxide-simethicone (MAALOX) oral suspension 30 mL  30 mL Oral Q4H PRN    haloperidol lactate (HALDOL) injection 2.5 mg  2.5 mg Intramuscular Q6H PRN Max 4/day    And    LORazepam (ATIVAN) injection 1 mg  1 mg Intramuscular Q6H PRN Max 4/day    And    benztropine (COGENTIN) injection 0.5 mg  0.5 mg Intramuscular Q6H PRN Max 4/day    haloperidol lactate (HALDOL) injection 5 mg  5 mg Intramuscular Q4H PRN Max 4/day    And    LORazepam (ATIVAN) injection 2 mg  2 mg Intramuscular Q4H PRN Max 4/day    And    benztropine (COGENTIN) injection 1 mg  1 mg Intramuscular Q4H PRN Max 4/day    benztropine (COGENTIN) tablet 1 mg  1 mg Oral Q6H PRN    chlorproMAZINE (THORAZINE) tablet 100 mg  100 mg Oral BID    Or    chlorproMAZINE (THORAZINE) injection SOLN 50 mg  50 mg Intramuscular BID    [START ON 1/12/2024] cholecalciferol (VITAMIN D3) tablet 1,000 Units  1,000 Units Oral Daily    cyanocobalamin (VITAMIN B-12) tablet 1,000 mcg  1,000 mcg Oral Daily    Diclofenac Sodium (VOLTAREN) 1 % topical gel 2 g  2 g Topical 4x Daily PRN    ergocalciferol (VITAMIN D2) capsule 50,000 Units  50,000 Units Oral Weekly    haloperidol (HALDOL) tablet 2 mg  2 mg Oral Q4H PRN Max 6/day    haloperidol (HALDOL) tablet 5 mg  5 mg Oral Q6H PRN Max 4/day    haloperidol (HALDOL) tablet 5 mg  5 mg Oral Q4H PRN Max 4/day    hydrOXYzine HCL (ATARAX) tablet 25 mg  25 mg Oral Q6H PRN Max 4/day    hydrOXYzine HCL (ATARAX) tablet 50 mg  50 mg Oral Q4H PRN Max 4/day    Or    LORazepam (ATIVAN) injection 1 mg  1 mg Intramuscular Q4H PRN    LORazepam (ATIVAN) tablet 1 mg  1 mg Oral Q8H PRN    Or    LORazepam (ATIVAN) injection 2 mg  2 mg Intramuscular Q8H PRN    nicotine polacrilex (NICORETTE) gum 4 mg  4 mg Oral Q2H PRN    OLANZapine (ZyPREXA ZYDIS) dispersible tablet 15 mg  15 mg Oral BID    Or    OLANZapine (ZyPREXA) IM injection 7.5 mg  7.5 mg Intramuscular BID    polyethylene glycol (MIRALAX) packet 17 g  17 g Oral Daily PRN    sodium chloride (OCEAN) 0.65 % nasal spray 2 spray  2 spray Each Nare Q1H PRN    traZODone (DESYREL) tablet 150 mg  150 mg Oral HS PRN   . Labs: I have personally reviewed all pertinent laboratory/tests results. Most Recent Labs:   Lab Results   Component Value Date    WBC 6.43 11/07/2023    RBC 5.56 11/07/2023    HGB 16.0 11/07/2023    HCT 50.3 (H) 11/07/2023     11/07/2023    RDW 14.4 11/07/2023    NEUTROABS 3.50 11/07/2023    SODIUM 138 11/07/2023    K 3.5 11/07/2023     11/07/2023    CO2 31 11/07/2023    BUN 9 11/07/2023    CREATININE 0.87 11/07/2023    GLUC 81 11/07/2023    GLUF 81 11/07/2023    CALCIUM 9.6 11/07/2023    AST 27 11/07/2023    ALT 34 11/07/2023    ALKPHOS 99 11/07/2023    TP 7.4 11/07/2023    ALB 4.3 11/07/2023    TBILI 0.56 11/07/2023    CHOLESTEROL 125 11/07/2023    HDL 43 11/07/2023    TRIG 82 11/07/2023    LDLCALC 66 11/07/2023    NONHDLC 82 11/07/2023    [id number] 1.331 11/07/2023    HGBA1C 5.7 (H) 11/07/2023     11/07/2023       Counseling / Coordination of Care  Total floor / unit time spent today 15 minutes. Greater than 50% of total time was spent with the patient and / or family counseling and / or coordination of care.  A description of the counseling / coordination of care: Discussion of patient case with treatment team

## 2023-11-16 NOTE — PROGRESS NOTES
11/16/23   Team Meeting   Meeting Type Daily Rounds   Team Members Present   Team Members Present Physician;Nurse;   Physician Team Member Dr Khurram Welsh MD; Kam Park, [address] [place] [address]   Nursing Team Member Maria Elena Greenberg [place]   Social Work Team Member Monica Ivy   Patient/Family Present   Patient Present No   Patient's Family Present No     Threw thorazine med cup at LPN today, agitated-threatening to fit staff if IM is given, doing karate after meds, talks about numbers often, threatening legal action.

## 2023-11-16 NOTE — SOCIAL WORK
Pt notified of Monday 2pm 304 hearing. Pt agreeable to attend. Pt presenting agitated, irritable, demanding another sw call to mother "right now". Sw explained contact with mother was made Tuesday, nursing has been in contact with mother day. Sw ended conversation as pt was unable to redirect self with sw support provided.

## 2023-11-16 NOTE — NURSING NOTE
Patient awake most the night. Refused prn Trazodone earlier in shift. Air boxing and karate chopping in hallway but pleasant and cooperative.

## 2023-11-16 NOTE — PROGRESS NOTES
Progress Note - [address] 25 y.o. male MRN: [id number]  Unit/Bed#: [facility] 222-01 Encounter: [id number]    Assessment/Plan   Principal Problem:    Mood disorder ([address]) r/o Bipolar D/o  Active Problems:    Autism spectrum disorder    Continuous cannabis use    History of oppositional defiant disorder      Behavior over the last 24 hours:  regressed  Sleep: Decreased sleeping hours  Appetite: normal  Medication side effects: No  ROS: all other systems are negative    Subjective: Patient was seen today during morning rounds along with the treatment team.  Patient continues to be irritable and disorganized. He is not willing to cooperate with the treatment team.  He is requesting to be discharged and requesting to stop taking his medications. He is agreeable to start mood metabolizer and he claimed that he had side effects and allergy to most of the medications. He accused the provider that he does not know how to prescribe medications. He is threatening to take legal action for his over stay in the hospital    Mental Status Evaluation:  Appearance:  disheveled   Behavior:  psychomotor agitation and restless and fidgety   Speech:  pressured   Mood:  angry, irritable, and labile   Affect:  inappropriate, increased in intensity, and increased in range   Thought Process:  disorganized   Associations: flight of ideas   Thought Content:  Ruminations   Perceptual Disturbances: None   Risk Potential: Suicidal Ideations none  Homicidal Ideations without plan  Potential for Aggression Yes due to verbal threats   Sensorium:  person and place   Memory:  recent and remote memory grossly intact   Consciousness:  alert and awake    Attention: attention span appeared shorter than expected for age   Insight:  limited   Judgment: limited   Gait/Station: normal gait/station   Motor Activity: no abnormal movements     Progress Toward Goals: Continue Zyprexa 15 mg twice a day and add Thorazine 100 mg twice a day. 304 commitment petition was submitted    Recommended Treatment: Continue with group therapy, milieu therapy and occupational therapy. Risks, benefits and possible side effects of Medications:   Patient does not verbalize understanding at this time and will require further explanation.       Medications: all current active meds have been reviewed, continue current psychiatric medications, and current meds:   Current Facility-Administered Medications   Medication Dose Route Frequency    acetaminophen (TYLENOL) tablet 650 mg  650 mg Oral Q6H PRN    acetaminophen (TYLENOL) tablet 650 mg  650 mg Oral Q4H PRN    acetaminophen (TYLENOL) tablet 975 mg  975 mg Oral Q6H PRN    aluminum-magnesium hydroxide-simethicone (MAALOX) oral suspension 30 mL  30 mL Oral Q4H PRN    haloperidol lactate (HALDOL) injection 2.5 mg  2.5 mg Intramuscular Q6H PRN Max 4/day    And    LORazepam (ATIVAN) injection 1 mg  1 mg Intramuscular Q6H PRN Max 4/day    And    benztropine (COGENTIN) injection 0.5 mg  0.5 mg Intramuscular Q6H PRN Max 4/day    haloperidol lactate (HALDOL) injection 5 mg  5 mg Intramuscular Q4H PRN Max 4/day    And    LORazepam (ATIVAN) injection 2 mg  2 mg Intramuscular Q4H PRN Max 4/day    And    benztropine (COGENTIN) injection 1 mg  1 mg Intramuscular Q4H PRN Max 4/day    benztropine (COGENTIN) tablet 1 mg  1 mg Oral Q6H PRN    [START ON 11/16/2023] chlorproMAZINE (THORAZINE) tablet 100 mg  100 mg Oral BID    Or    [START ON 11/16/2023] chlorproMAZINE (THORAZINE) injection SOLN 50 mg  50 mg Intramuscular BID    [START ON 1/12/2024] cholecalciferol (VITAMIN D3) tablet 1,000 Units  1,000 Units Oral Daily    cyanocobalamin (VITAMIN B-12) tablet 1,000 mcg  1,000 mcg Oral Daily    Diclofenac Sodium (VOLTAREN) 1 % topical gel 2 g  2 g Topical 4x Daily PRN    ergocalciferol (VITAMIN D2) capsule 50,000 Units  50,000 Units Oral Weekly    haloperidol (HALDOL) tablet 2 mg  2 mg Oral Q4H PRN Max 6/day    haloperidol (HALDOL) tablet 5 mg  5 mg Oral Q6H PRN Max 4/day    haloperidol (HALDOL) tablet 5 mg  5 mg Oral Q4H PRN Max 4/day    hydrOXYzine HCL (ATARAX) tablet 25 mg  25 mg Oral Q6H PRN Max 4/day    hydrOXYzine HCL (ATARAX) tablet 50 mg  50 mg Oral Q4H PRN Max 4/day    Or    LORazepam (ATIVAN) injection 1 mg  1 mg Intramuscular Q4H PRN    LORazepam (ATIVAN) tablet 1 mg  1 mg Oral Q8H PRN    Or    LORazepam (ATIVAN) injection 2 mg  2 mg Intramuscular Q8H PRN    nicotine polacrilex (NICORETTE) gum 4 mg  4 mg Oral Q2H PRN    OLANZapine (ZyPREXA ZYDIS) dispersible tablet 15 mg  15 mg Oral BID    Or    OLANZapine (ZyPREXA) IM injection 7.5 mg  7.5 mg Intramuscular BID    polyethylene glycol (MIRALAX) packet 17 g  17 g Oral Daily PRN    sodium chloride (OCEAN) 0.65 % nasal spray 2 spray  2 spray Each Nare Q1H PRN    traZODone (DESYREL) tablet 150 mg  150 mg Oral HS PRN   . Labs: I have personally reviewed all pertinent laboratory/tests results. Most Recent Labs:   Lab Results   Component Value Date    WBC 6.43 11/07/2023    RBC 5.56 11/07/2023    HGB 16.0 11/07/2023    HCT 50.3 (H) 11/07/2023     11/07/2023    RDW 14.4 11/07/2023    NEUTROABS 3.50 11/07/2023    SODIUM 138 11/07/2023    K 3.5 11/07/2023     11/07/2023    CO2 31 11/07/2023    BUN 9 11/07/2023    CREATININE 0.87 11/07/2023    GLUC 81 11/07/2023    GLUF 81 11/07/2023    CALCIUM 9.6 11/07/2023    AST 27 11/07/2023    ALT 34 11/07/2023    ALKPHOS 99 11/07/2023    TP 7.4 11/07/2023    ALB 4.3 11/07/2023    TBILI 0.56 11/07/2023    CHOLESTEROL 125 11/07/2023    HDL 43 11/07/2023    TRIG 82 11/07/2023    LDLCALC 66 11/07/2023    NONHDLC 82 11/07/2023    [id number] 1.331 11/07/2023    HGBA1C 5.7 (H) 11/07/2023     11/07/2023       Counseling / Coordination of Care  Total floor / unit time spent today 35 minutes. Greater than 50% of total time was spent with the patient and / or family counseling and / or coordination of care.  A description of the counseling / coordination of care: Discussion of patient case with treatment team

## 2023-11-17 LAB
ATRIAL RATE: 81 BPM
P AXIS: 42 DEGREES
PR INTERVAL: 150 MS
QRS AXIS: 61 DEGREES
QRSD INTERVAL: 92 MS
QT INTERVAL: 378 MS
QTC INTERVAL: 439 MS
T WAVE AXIS: 22 DEGREES
VENTRICULAR RATE: 81 BPM

## 2023-11-17 PROCEDURE — 93010 ELECTROCARDIOGRAM REPORT: CPT | Performed by: INTERNAL MEDICINE

## 2023-11-17 PROCEDURE — 99232 SBSQ HOSP IP/OBS MODERATE 35: CPT | Performed by: STUDENT IN AN ORGANIZED HEALTH CARE EDUCATION/TRAINING PROGRAM

## 2023-11-17 RX ORDER — LANOLIN ALCOHOL/MO/W.PET/CERES
6 CREAM (GRAM) TOPICAL
Status: DISCONTINUED | OUTPATIENT
Start: 2023-11-17 | End: 2023-11-28 | Stop reason: HOSPADM

## 2023-11-17 RX ADMIN — CHLORPROMAZINE HYDROCHLORIDE 100 MG: 100 TABLET, FILM COATED ORAL at 21:30

## 2023-11-17 RX ADMIN — CHLORPROMAZINE HYDROCHLORIDE 100 MG: 100 TABLET, FILM COATED ORAL at 09:00

## 2023-11-17 RX ADMIN — CYANOCOBALAMIN TAB 500 MCG 1000 MCG: 500 TAB at 08:59

## 2023-11-17 RX ADMIN — Medication 2 G: at 06:30

## 2023-11-17 RX ADMIN — Medication 6 MG: at 21:30

## 2023-11-17 RX ADMIN — OLANZAPINE 15 MG: 5 TABLET, ORALLY DISINTEGRATING ORAL at 09:00

## 2023-11-17 RX ADMIN — OLANZAPINE 15 MG: 5 TABLET, ORALLY DISINTEGRATING ORAL at 21:31

## 2023-11-17 NOTE — PLAN OF CARE
Problem: Ineffective Coping  Goal: Participates in unit activities  Description: Interventions:  - Provide therapeutic environment   - Provide required programming   - Redirect inappropriate behaviors   Outcome: Not Progressing   Did not attend RT AM group.

## 2023-11-17 NOTE — PROGRESS NOTES
Progress Note - [address] 25 y.o. male MRN: [id number]  Unit/Bed#: [facility] 222-01 Encounter: [id number]    Assessment/Plan   Principal Problem:    Mood disorder ([address]) r/o Bipolar D/o  Active Problems:    Autism spectrum disorder    Continuous cannabis use    History of oppositional defiant disorder      Behavior over the last 24 hours: Improved  Sleep: Decreased sleeping hours  Appetite: normal  Medication side effects: No  ROS: all other systems are negative    Subjective: Patient was seen today during morning rounds. Patient is hyperactive and reports having excessive energy and increased exercise to get his energy out. He is more agreeable to take his medications today than yesterday and he is asking for melatonin at bedtime instead of trazodone. 304 hearing is scheduled for next Monday    Mental Status Evaluation:  Appearance:  disheveled   Behavior:  psychomotor agitation and restless and fidgety   Speech:  pressured   Mood:  dysthymic   Affect:  inappropriate, increased in intensity, and increased in range   Thought Process:  disorganized   Associations: flight of ideas   Thought Content:  Ruminations   Perceptual Disturbances: None   Risk Potential: Suicidal Ideations none  Homicidal Ideations without plan  Potential for Aggression Yes due to verbal threats   Sensorium:  person and place   Memory:  recent and remote memory grossly intact   Consciousness:  alert and awake    Attention: attention span appeared shorter than expected for age   Insight:  limited   Judgment: limited   Gait/Station: normal gait/station   Motor Activity: no abnormal movements     Progress Toward Goals: Continue Zyprexa 15 mg twice a day and add Thorazine 100 mg twice a day. Added melatonin 6 mg at bedtime    Recommended Treatment: Continue with group therapy, milieu therapy and occupational therapy.       Risks, benefits and possible side effects of Medications:   Patient does not verbalize understanding at this time and will require further explanation.       Medications: all current active meds have been reviewed, continue current psychiatric medications, and current meds:   Current Facility-Administered Medications   Medication Dose Route Frequency    acetaminophen (TYLENOL) tablet 650 mg  650 mg Oral Q6H PRN    acetaminophen (TYLENOL) tablet 650 mg  650 mg Oral Q4H PRN    acetaminophen (TYLENOL) tablet 975 mg  975 mg Oral Q6H PRN    aluminum-magnesium hydroxide-simethicone (MAALOX) oral suspension 30 mL  30 mL Oral Q4H PRN    haloperidol lactate (HALDOL) injection 2.5 mg  2.5 mg Intramuscular Q6H PRN Max 4/day    And    LORazepam (ATIVAN) injection 1 mg  1 mg Intramuscular Q6H PRN Max 4/day    And    benztropine (COGENTIN) injection 0.5 mg  0.5 mg Intramuscular Q6H PRN Max 4/day    haloperidol lactate (HALDOL) injection 5 mg  5 mg Intramuscular Q4H PRN Max 4/day    And    LORazepam (ATIVAN) injection 2 mg  2 mg Intramuscular Q4H PRN Max 4/day    And    benztropine (COGENTIN) injection 1 mg  1 mg Intramuscular Q4H PRN Max 4/day    benztropine (COGENTIN) tablet 1 mg  1 mg Oral Q6H PRN    chlorproMAZINE (THORAZINE) tablet 100 mg  100 mg Oral BID    Or    chlorproMAZINE (THORAZINE) injection SOLN 50 mg  50 mg Intramuscular BID    [START ON 1/12/2024] cholecalciferol (VITAMIN D3) tablet 1,000 Units  1,000 Units Oral Daily    cyanocobalamin (VITAMIN B-12) tablet 1,000 mcg  1,000 mcg Oral Daily    Diclofenac Sodium (VOLTAREN) 1 % topical gel 2 g  2 g Topical 4x Daily PRN    ergocalciferol (VITAMIN D2) capsule 50,000 Units  50,000 Units Oral Weekly    haloperidol (HALDOL) tablet 2 mg  2 mg Oral Q4H PRN Max 6/day    haloperidol (HALDOL) tablet 5 mg  5 mg Oral Q6H PRN Max 4/day    haloperidol (HALDOL) tablet 5 mg  5 mg Oral Q4H PRN Max 4/day    hydrOXYzine HCL (ATARAX) tablet 25 mg  25 mg Oral Q6H PRN Max 4/day    hydrOXYzine HCL (ATARAX) tablet 50 mg  50 mg Oral Q4H PRN Max 4/day    Or    LORazepam (ATIVAN) injection 1 mg  1 mg Intramuscular Q4H PRN    LORazepam (ATIVAN) tablet 1 mg  1 mg Oral Q8H PRN    Or    LORazepam (ATIVAN) injection 2 mg  2 mg Intramuscular Q8H PRN    melatonin tablet 6 mg  6 mg Oral HS    nicotine polacrilex (NICORETTE) gum 4 mg  4 mg Oral Q2H PRN    OLANZapine (ZyPREXA ZYDIS) dispersible tablet 15 mg  15 mg Oral BID    Or    OLANZapine (ZyPREXA) IM injection 7.5 mg  7.5 mg Intramuscular BID    polyethylene glycol (MIRALAX) packet 17 g  17 g Oral Daily PRN    sodium chloride (OCEAN) 0.65 % nasal spray 2 spray  2 spray Each Nare Q1H PRN    traZODone (DESYREL) tablet 150 mg  150 mg Oral HS PRN   . Labs: I have personally reviewed all pertinent laboratory/tests results. Most Recent Labs:   Lab Results   Component Value Date    WBC 6.43 11/07/2023    RBC 5.56 11/07/2023    HGB 16.0 11/07/2023    HCT 50.3 (H) 11/07/2023     11/07/2023    RDW 14.4 11/07/2023    NEUTROABS 3.50 11/07/2023    SODIUM 138 11/07/2023    K 3.5 11/07/2023     11/07/2023    CO2 31 11/07/2023    BUN 9 11/07/2023    CREATININE 0.87 11/07/2023    GLUC 81 11/07/2023    GLUF 81 11/07/2023    CALCIUM 9.6 11/07/2023    AST 27 11/07/2023    ALT 34 11/07/2023    ALKPHOS 99 11/07/2023    TP 7.4 11/07/2023    ALB 4.3 11/07/2023    TBILI 0.56 11/07/2023    CHOLESTEROL 125 11/07/2023    HDL 43 11/07/2023    TRIG 82 11/07/2023    LDLCALC 66 11/07/2023    NONHDLC 82 11/07/2023    [id number] 1.331 11/07/2023    HGBA1C 5.7 (H) 11/07/2023     11/07/2023       Counseling / Coordination of Care  Total floor / unit time spent today 15 minutes. Greater than 50% of total time was spent with the patient and / or family counseling and / or coordination of care.  A description of the counseling / coordination of care: Discussion of patient case with treatment team

## 2023-11-17 NOTE — PROGRESS NOTES
11/17/23    Team Meeting   Meeting Type Daily Rounds   Team Members Present   Team Members Present Physician;Nurse;   Physician Team Member Dr Marlin Proctor MD; Kemar Rashid[address]   Nursing Team Member Maci Mariscal   Social Work Team Member Meggan Curtis, [place]   Patient/Family Present   Patient Present No   Patient's Family Present No     2pm 303 hearing Monday, on mouth checks, irritable, agitated, needing limit setting, labile, poor sleep, 150mg trazodone ineffective.

## 2023-11-17 NOTE — PLAN OF CARE
Problem: Anxiety  Goal: Anxiety is at manageable level  Description: Interventions:  - Assess and monitor patient's anxiety level. - Monitor for signs and symptoms (heart palpitations, chest pain, shortness of breath, headaches, nausea, feeling jumpy, restlessness, irritable, apprehensive). - Collaborate with interdisciplinary team and initiate plan and interventions as ordered.   - [place] patient to unit/surroundings  - Explain treatment plan  - Encourage participation in care  - Encourage verbalization of concerns/fears  - Identify coping mechanisms  - Assist in developing anxiety-reducing skills  - Administer/offer alternative therapies  - Limit or eliminate stimulants  Outcome: Progressing     Problem: Risk for Violence/Aggression Toward Others  Goal: Treatment Goal: Refrain from acts of violence/aggression during length of stay, and demonstrate improved impulse control at the time of discharge  Outcome: Progressing  Goal: Verbalize thoughts and feelings  Description: Interventions:  - Assess and re-assess patient's level of risk, every waking shift  - Engage patient in 1:1 interactions, daily, for a minimum of 15 minutes   - Allow patient to express feelings and frustrations in a safe and non-threatening manner   - Establish rapport/trust with patient   Outcome: Progressing  Goal: Refrain from harming others  Outcome: Progressing  Goal: Control angry outbursts  Description: Interventions:  - Monitor patient closely, per order  - Ensure early verbal de-escalation  - Monitor prn medication needs  - Set reasonable/therapeutic limits, outline behavioral expectations, and consequences   - Provide a non-threatening milieu, utilizing the least restrictive interventions   Outcome: Progressing   See chart note

## 2023-11-17 NOTE — NURSING NOTE
Patient awake a lot during the night. Pacing in hallways. Air karate movements and dancing. Safety checks continue Q 7 minutes.

## 2023-11-17 NOTE — NURSING NOTE
Patient visible on unit. No irritability noted. Pleasant and cooperative, No behaviors. Denies SI,HI,AVH, anxiety or depression. Safety checks continue Q 7 minutes. Akash Bull

## 2023-11-17 NOTE — PROGRESS NOTES
Clothing in the Belongings Room:  Green Zip Jacket  [place] Given to Patient:  1 Sweater  1 [address] [facility]  3 T-Shirts  1 Crocks for Feet.

## 2023-11-17 NOTE — PROGRESS NOTES
Progress Note - Michell Ann 25 y.o. male MRN: [id number]    Unit/Bed#: [facility] 222-01 Encounter: [id number]        Subjective:   Patient seen and examined at bedside after reviewing the chart and discussing the case with the caring staff. Patient reports no acute symptoms. Physical Exam   Vitals: Blood pressure 151/85, pulse 105, temperature 97.9 °F (36.6 °C), temperature source Axillary, resp. rate 18, height 5' 8" (1.727 m), weight 73.5 kg (162 lb 0.6 oz), SpO2 97 %. ,Body mass index is 24.64 kg/m². Constitutional: Patient in no acute distress. HEENT: PERR, EOMI, MMM, neck supple. Cardiovascular: Normal rate, regular rhythm. Pulmonary/Chest: Lungs clear bilaterally. Abdomen: Non distended. Assessment/Plan:  Michell Ann is a(n) 25y.o. year old male with mood disorder. 1.  Arthralgia/headache. Patient may get Tylenol as needed. 2.  Insomnia. Patient may take trazodone as needed. 3.  Prediabetes. Hgb A1c 5.7% on 11/7/23. Lifestyle modifications. 4.  Vitamin B12 deficiency. Patient started on vitamin B12 supplement. 5.  Vitamin D deficiency. Patient started on vitamin D2 72995 units weekly x 10 weeks followed by vitamin D3 1000 units daily. 6.  Tobacco abuse. Patient may request nicotine gum as needed. The patient was discussed with Dr. Alexus Gomez and he is in agreement with the above note.

## 2023-11-18 PROCEDURE — 99232 SBSQ HOSP IP/OBS MODERATE 35: CPT | Performed by: PSYCHIATRY & NEUROLOGY

## 2023-11-18 RX ADMIN — OLANZAPINE 15 MG: 5 TABLET, ORALLY DISINTEGRATING ORAL at 21:07

## 2023-11-18 RX ADMIN — CHLORPROMAZINE HYDROCHLORIDE 100 MG: 100 TABLET, FILM COATED ORAL at 21:07

## 2023-11-18 RX ADMIN — CHLORPROMAZINE HYDROCHLORIDE 100 MG: 100 TABLET, FILM COATED ORAL at 09:12

## 2023-11-18 RX ADMIN — CYANOCOBALAMIN TAB 500 MCG 1000 MCG: 500 TAB at 09:12

## 2023-11-18 RX ADMIN — OLANZAPINE 15 MG: 5 TABLET, ORALLY DISINTEGRATING ORAL at 09:12

## 2023-11-18 RX ADMIN — Medication 6 MG: at 21:07

## 2023-11-18 NOTE — PROGRESS NOTES
Progress Note - Jeane Fallon 25 y.o. male MRN: [id number]    Unit/Bed#: [facility] 222-01 Encounter: [id number]        Subjective:   Patient seen and examined at bedside after reviewing the chart and discussing the case with the caring staff. Patient reports no acute symptoms. Physical Exam   Vitals: Blood pressure 135/76, pulse 84, temperature 97.5 °F (36.4 °C), temperature source Temporal, resp. rate 16, height 5' 8" (1.727 m), weight 73.5 kg (162 lb 0.6 oz), SpO2 98 %. ,Body mass index is 24.64 kg/m². Constitutional: Patient in no acute distress. HEENT: PERR, EOMI, MMM, neck supple. Cardiovascular: Normal rate, regular rhythm. Pulmonary/Chest: Lungs clear bilaterally. Abdomen: Non distended. Assessment/Plan:  Jeane Fallon is a(n) 25y.o. year old male with mood disorder. 1.  Arthralgia/headache. Patient may get Tylenol as needed. 2.  Insomnia. Patient may take trazodone as needed. 3.  Prediabetes. Hgb A1c 5.7% on 11/7/23. Lifestyle modifications. 4.  Vitamin B12 deficiency. Patient started on vitamin B12 supplement. 5.  Vitamin D deficiency. Patient started on vitamin D2 27192 units weekly x 10 weeks followed by vitamin D3 1000 units daily. 6.  Tobacco abuse. Patient may request nicotine gum as needed. The patient was discussed with Dr. Eren Rivas and he is in agreement with the above note.

## 2023-11-18 NOTE — NURSING NOTE
Pt awake most of the night, constant visits to nursing station with all kinds of requests. Staff maintained Q 7 security checks. We will continue to monitor and assist as needed

## 2023-11-18 NOTE — PROGRESS NOTES
Psychiatric Progress Note - Department of [address] 25 y.o. male MRN: [id number]  Unit/Bed#: [facility] 222-01 Encounter: [id number]      ASSESSMENT & PLAN     Principal Problem:    Mood disorder ([address]) r/o Bipolar D/o  Active Problems:    Autism spectrum disorder    Continuous cannabis use    History of oppositional defiant disorder      Recommended Treatment:   No psychopharmacologic changes necessary at this moment; will continue to assess daily for further optimization. Continue medical management by medical team.  Continue to assess for adverse medication side effects. Continue with pharmacotherapy, and promote patient participation in groups, therapeutic milieu, and occupational therapy. Encourage Louann Villarreal to participate in nonverbal forms of therapy including journaling and art/music therapy. Continue frequent safety checks every 7 minutes and vitals per unit protocol. Continue to engage CM/SW to assist with collateral, disposition planning, and the implementation of an individualized, patient-centered plan of care. Discharge disposition:  Case discussed with treatment team. 303 commitment status. SUBJECTIVE     All documentation including nursing notes, medication history to ensure medication adherence on the unit, labs, and vitals were reviewed during discussion with treatment team.  Over the past 24 hours, staff noted the patient has been more cooperative on the unit and compliant with medications with no acute events overnight. Nursing reported patient is compliant with mouth checks, mostly isolative last evening, bizarre behavior, rude to staff, verbally aggressive at times, profane, and awake most of the night as per chart. Luc Tadeo was evaluated this morning for continuity of care and no acute distress noted throughout the evaluation.  Today on exam, the patient was seen outside the group room, he was bright on approach, previously was coloring with another peer; was coloring a man with marijuana on his side stating "Olvin Doctor," and reports "I'm chillin'" when asked about his mood. Patient reported being more compliant with medications and attempting to use coping strategies to "redirect energy" and states he uses mindfulness to sense anxiety especially when he feels more tension and body heat in his extremities that make him agitated and he would like to continue working on different techniques. Patient was receptive to the ice cube technique. Patient was braggadocious and perseverating over benefits of marijuana and wanting to continue using the substance as an outpatient. Somewhat receptive to psychoeducation regarding risks/harms of substance use and marijuana chronic use versus FDA approved medications for his diagnosis. Today, Grady Dias denies suicidal and homicidal ideations and is bakari for safety on the unit. He denied any mood swings recently and denied hallucinations or paranoia. PSYCHIATRIC REVIEW OF SYSTEMS     Behavior over the last 24 hours: improved  Sleep: decreased  Appetite: normal  Medication side effects: No    REVIEW OF SYSTEMS     Review of systems: no complaints and all other systems are negative    OBJECTIVE     Vital Signs in Past 24 Hours:  Temp:  [97.4 °F (36.3 °C)-97.5 °F (36.4 °C)] 97.4 °F (36.3 °C)  HR:  [84-89] 89  Resp:  [16-17] 17  BP: (135-149)/(76-80) 149/80    Intake/Output in Past 24 hours:  No intake/output data recorded. No intake/output data recorded. Laboratory Results:  I have personally reviewed all pertinent laboratory/tests results.   EKG   Lab Results   Component Value Date    VENTRATE 81 11/16/2023    ATRIALRATE 81 11/16/2023    PRINT 150 11/16/2023    QRSDINT 92 11/16/2023    QTINT 378 11/16/2023    QTCINT 439 11/16/2023    PAXIS 42 11/16/2023    QRSAXIS 61 11/16/2023    TWAVEAXIS 22 11/16/2023       Mental Status Evaluation:    Appearance:  20-year-old black male, appears stated age, casually dressed with T-shirt tucked into his jeans, improved grooming and hygiene, smiling at times   Behavior:  Restless and fidgety, over bright and pleasant, cooperative, calm, good eye contact, talkative   Speech:  Increased rate, hypertalkative, normal volume, perseverative, coherent   Mood:  "chillin'"   Affect:  overbright, expansive, increased in intensity, increased in range   Language naming objects and repeating phrases   Thought Process:  Circumstantial, increased rate of thoughts, racing thoughts, preservative   Associations: circumstantial associations   Thought Content:  grandiose delusions, somatic preoccupation, ruminations   Perceptual Disturbances: Denies auditory or visual hallucinations and Does not appear to be responding to internal stimuli   Risk Potential: Suicidal ideation - None, would talk to staff if not feeling safe on the unit  Homicidal ideation - None at present  Potential for aggression - No longer agitated   Sensorium:  oriented to person, place, time/date, and situation   Memory/Cognition:  recent and remote memory grossly intact   Consciousness:  alert and awake   Attention/Concentration: attention span and concentration appear shorter than expected for age   Intellect average   Insight:  limited   Judgment: limited   Gait/Station: normal gait/station, normal balance   Motor Activity: no abnormal movements     Recommended Treatment:   See above for assessment and plan. Behavioral Health Medications: all current active meds have been reviewed.     Current Facility-Administered Medications   Medication Dose Route Frequency Provider Last Rate    acetaminophen  650 mg Oral Q6H PRN Arzella Dole Medei, CRNP      acetaminophen  650 mg Oral Q4H PRN Arzella Dole Medei, CRNP      acetaminophen  975 mg Oral Q6H PRN Arzella Dole Medei, CRNP      aluminum-magnesium hydroxide-simethicone  30 mL Oral Q4H PRN Arzella Dole Medei, CRNP      haloperidol lactate  2.5 mg Intramuscular Q6H PRN Max 4/day Arzella Dole Medei, CRNP      And    LORazepam  1 mg Intramuscular Q6H PRN Max 4/day Emanuel Kawasaki Medei, CRNP      And    benztropine  0.5 mg Intramuscular Q6H PRN Max 4/day Emilio Kawasaki Medei, CRNP      haloperidol lactate  5 mg Intramuscular Q4H PRN Max 4/day Emilio Kawasaki Medei, CRNP      And    LORazepam  2 mg Intramuscular Q4H PRN Max 4/day Emanuel Kawasaki Medei, CRNP      And    benztropine  1 mg Intramuscular Q4H PRN Max 4/day Emanuel Kawasaki Medei, CRNP      benztropine  1 mg Oral Q6H PRN Emanuel Kawasaki Medei, CRNP      chlorproMAZINE  100 mg Oral BID Rosana Watts MD      Or    chlorproMAZINE  50 mg Intramuscular BID Rosana Watts MD      [START ON 1/12/2024] cholecalciferol  1,000 Units Oral Daily Jeanette L Dagnall PA-C      cyanocobalamin  1,000 mcg Oral Daily Jeanette L Dagnall, PA-C      Diclofenac Sodium  2 g Topical 4x Daily PRN Esperanza Griggs MD      ergocalciferol  50,000 Units Oral Weekly Jeanette L Dagnall, PA-C      haloperidol  2 mg Oral Q4H PRN Max 6/day Debora M Medei, CRNP      haloperidol  5 mg Oral Q6H PRN Max 4/day Emilio Kawasaki Medei, CRNP      haloperidol  5 mg Oral Q4H PRN Max 4/day Emilio Kawasaki Medei, CRNP      hydrOXYzine HCL  25 mg Oral Q6H PRN Max 4/day Emilio Kawasaki Medei, CRNP      hydrOXYzine HCL  50 mg Oral Q4H PRN Max 4/day Emilio Kawasaki Medei, CRNP      Or    LORazepam  1 mg Intramuscular Q4H PRN Emilio Kawasaki Medei, CRNP      LORazepam  1 mg Oral Q8H PRN Emilio Kawasaki Medei, CRNP      Or    LORazepam  2 mg Intramuscular Q8H PRN Emilio Kawasaki Medei, CRNP      melatonin  6 mg Oral HS Rosana Watts MD      nicotine polacrilex  4 mg Oral Q2H PRN Esperanza Griggs MD      OLANZapine  15 mg Oral BID [facility] Prashanth Pina MD      Or    OLANZapine  7.5 mg Intramuscular BID Rosana Watts MD      polyethylene glycol  17 g Oral Daily PRN Emanuel Kawasaki Medei, CRNP      sodium chloride  2 spray Each Nare Q1H PRN Jeanette López PA-C      traZODone  150 mg Oral HS PRN Emanuel Kawasaki Medei, CRNP         Risks, benefits and possible side effects of Medications:   Risks, benefits, and possible side effects of medications explained to patient and patient verbalizes understanding. Dual Antipsychotic Rationale: Due to chronicity of mental illness and failure to respond to antipsychotic monotherapy    Inpatient Psychiatric Certification: Based upon physical, mental and social evaluations, I certify that inpatient psychiatric services are medically necessary for this patient for a duration of  >2  midnights for the treatment of Mood disorder [facility])  Available alternative community resources do not meet the patient's mental health care needs. I further attest that an established written individualized plan of care has been implemented and is outlined in the patient's medical records. Counseling/Coordination of Care    Total unit time spent today was greater than 15 minutes. Greater than 50% of total time was spent with the patient and/or patient's relatives and/or coordination of patient's care. Patient's rights, confidentiality, exceptions to confidentiality, use of electronic medical record including appropriate staff access to medical record regarding behavioral health services and consent to treatment were reviewed. Dimitris Adams,     This note was completed in part utilizing [facility] [address] Grammatical, translation, syntax errors, random word insertions, spelling mistakes, and incomplete sentences may be an occasional consequence of this system secondary to software limitations with voice recognition, ambient noise, and hardware issues. If you have any questions or concerns about the content, text, or information contained within the body of this dictation, please contact the provider for clarification. Note Share: This note was not shared with the patient due to reasonable likelihood of causing patient harm.

## 2023-11-18 NOTE — NURSING NOTE
Patient focused on food/multiple drinks, limits set & patient became agitated, making air motions of shooting staff, calling staff "bitch" as he walks by them. Patient verbally redirected into his room. Patient currently in room. Q 7 minute checks maintained.

## 2023-11-18 NOTE — NURSING NOTE
Patient was mostly isolative in the room, bizarre  behavior, rude to staff. Patient reluctantly  complied with medication, focused on meals. Patient is verbally aggressive if things don't go his way. Staff maintained Q 7 safety checks, administered medications as prescribed and assisted as needed. We will continue to monitor support and encourage.

## 2023-11-18 NOTE — NURSING NOTE
Pt visible on unit. Irritable edge with redirection. Snack seeking. Needs limit setting. Pt needs prompting during mouth checks for compliance. No prn medications administered this shift. Safety precautions maintained. Will continue to monitor and assess.

## 2023-11-18 NOTE — NURSING NOTE
Pt visible on unit. Irritable edge with redirection. Snack seeking. Needs limit setting. Strict mouth checks. Pt needs prompting during mouth checks for compliance. Safety precautions maintained. Will continue to monitor and assess.

## 2023-11-19 PROCEDURE — 99232 SBSQ HOSP IP/OBS MODERATE 35: CPT | Performed by: PSYCHIATRY & NEUROLOGY

## 2023-11-19 RX ADMIN — CYANOCOBALAMIN TAB 500 MCG 1000 MCG: 500 TAB at 08:33

## 2023-11-19 RX ADMIN — POLYETHYLENE GLYCOL 3350 17 G: 17 POWDER, FOR SOLUTION ORAL at 11:46

## 2023-11-19 RX ADMIN — CHLORPROMAZINE HYDROCHLORIDE 100 MG: 100 TABLET, FILM COATED ORAL at 08:33

## 2023-11-19 RX ADMIN — CHLORPROMAZINE HYDROCHLORIDE 100 MG: 100 TABLET, FILM COATED ORAL at 20:15

## 2023-11-19 RX ADMIN — OLANZAPINE 15 MG: 5 TABLET, ORALLY DISINTEGRATING ORAL at 08:33

## 2023-11-19 RX ADMIN — Medication 6 MG: at 20:15

## 2023-11-19 RX ADMIN — OLANZAPINE 15 MG: 5 TABLET, ORALLY DISINTEGRATING ORAL at 20:15

## 2023-11-19 NOTE — NURSING NOTE
BLEPS assessment completed. WNL. Last BM was 11/18/23 but "luis antonio of hard" Administered Miralax.

## 2023-11-19 NOTE — PROGRESS NOTES
Progress Note - Yifan Carrillo 25 y.o. male MRN: [id number]    Unit/Bed#: [facility] 222-01 Encounter: [id number]        Subjective:   Patient seen and examined at bedside after reviewing the chart and discussing the case with the caring staff. Patient reports no acute symptoms. Physical Exam   Vitals: Blood pressure 136/85, pulse 70, temperature (!) 97.3 °F (36.3 °C), temperature source Temporal, resp. rate 16, height 5' 8" (1.727 m), weight 79.7 kg (175 lb 9.6 oz), SpO2 100 %. ,Body mass index is 26.7 kg/m². Constitutional: Patient in no acute distress. HEENT: PERR, EOMI, MMM, neck supple. Cardiovascular: Normal rate, regular rhythm. Pulmonary/Chest: Lungs clear bilaterally. Abdomen: Non distended. Assessment/Plan:  Yifan Carrillo is a(n) 25y.o. year old male with mood disorder. 1.  Arthralgia/headache. Patient may get Tylenol as needed. 2.  Insomnia. Patient may take trazodone as needed. 3.  Prediabetes. Hgb A1c 5.7% on 11/7/23. Lifestyle modifications. 4.  Vitamin B12 deficiency. Patient started on vitamin B12 supplement. 5.  Vitamin D deficiency. Patient started on vitamin D2 05798 units weekly x 10 weeks followed by vitamin D3 1000 units daily. 6.  Tobacco abuse. Patient may request nicotine gum as needed. The patient was discussed with Dr. Pauline Rosales and he is in agreement with the above note.

## 2023-11-19 NOTE — PROGRESS NOTES
Psychiatric Progress Note - Department of [address] 25 y.o. male MRN: [id number]  Unit/Bed#: [facility] 222-01 Encounter: [id number]      ASSESSMENT & PLAN     Principal Problem:    Mood disorder ([address]) r/o Bipolar D/o  Active Problems:    Autism spectrum disorder    Continuous cannabis use    History of oppositional defiant disorder      Recommended Treatment:   No psychopharmacologic changes necessary at this moment; will continue to assess daily for further optimization. Continue medical management by medical team.  Continue to assess for adverse medication side effects. Continue with pharmacotherapy, and promote patient participation in groups, therapeutic milieu, and occupational therapy. Encourage Roddy Thibodeaux to participate in nonverbal forms of therapy including journaling and art/music therapy. Continue frequent safety checks every 7 minutes and vitals per unit protocol. Discharge disposition:  Case discussed with treatment team. 303 commitment status. SUBJECTIVE     All documentation including nursing notes, medication history to ensure medication adherence on the unit, labs, and vitals were reviewed during discussion with treatment team.  Over the past 24 hours, staff noted the patient has been more cooperative on the unit and compliant with medications with no acute events overnight. Nursing reported patient began showing more insight, especially in regards to mouth checks, does become irritated by sounds and smells easily -attributing to characteristics in regards to ASD, and utilizes a mask and or earplugs when needed. Patient has been slightly grandiose at times, less irritable, no longer agitated, and attends all groups and is social with select peers. Camacho Echevarria was evaluated this morning for continuity of care and no acute distress noted throughout the evaluation.  Today on exam, the patient was seen in the hallway outside of the group room and reports feeling "frustrated."  Patient reports he does not believe he needs to be in the hospital this long, is upset at the court hearings, was redirectable informed to continue with good behaviors, working on coping strategies and implementing such, continuing to address frustration and any emotional dysregulation, and talking with staff for debriefs instead of "bottling it up."  Patient was receptive to supportive psychotherapy, did report frustration regarding other peers who have made profane racial remarks allegedly towards [place]. Today, Maurice hicks denies suicidal and homicidal ideations and is bakari for safety on the unit, denies hallucinations, paranoia, and pain    PSYCHIATRIC REVIEW OF SYSTEMS     Behavior over the last 24 hours: improved  Sleep: normal  Appetite: normal  Medication side effects: No    REVIEW OF SYSTEMS     Review of systems: no complaints and all other systems are negative    OBJECTIVE     Vital Signs in Past 24 Hours:  Temp:  [97.3 °F (36.3 °C)-97.4 °F (36.3 °C)] 97.3 °F (36.3 °C)  HR:  [70-89] 70  Resp:  [16-17] 16  BP: (136-149)/(80-85) 136/85    Intake/Output in Past 24 hours:  No intake/output data recorded. No intake/output data recorded. Laboratory Results:  I have personally reviewed all pertinent laboratory/tests results.     Mental Status Evaluation:    Appearance:  72-year-old male, appears stated age, casually dressed needed sweater and jeans, good grooming and hygiene, smiling at times with after redirection   Behavior:  Overall cooperative and pleasant towards this provider, agitated somewhat, redirectable, fair eye contact, restless and fidgety   Speech:  increased rate, hypertalkative, loud, circumstantial, perseverative, profane   Mood:  "Frustrated"   Affect:  expansive, increased in intensity, increased in range, anxious, more appropriate with tolerating anger and redirection   Language naming objects and repeating phrases   Thought Process:  circumstantial, increased rate of thoughts, racing of thoughts, perseverative, negative thinking   Associations: circumstantial associations   Thought Content:  grandiose delusions, paranoid thoughts, negative thoughts   Perceptual Disturbances: Denies auditory or visual hallucinations and Does not appear to be responding to internal stimuli   Risk Potential: Suicidal ideation - None, would talk to staff if not feeling safe on the unit  Homicidal ideation - None  Potential for aggression - No   Sensorium:  oriented to person, place, time/date, and situation   Memory/Cognition:  recent and remote memory grossly intact   Consciousness:  alert and awake   Attention/Concentration: attention span and concentration appear shorter than expected for age   Intellect average   Insight:  limited   Judgment: limited   Gait/Station: normal gait/station, normal balance   Motor Activity: no abnormal movements     Recommended Treatment:   See above for assessment and plan. Behavioral Health Medications: all current active meds have been reviewed.     Current Facility-Administered Medications   Medication Dose Route Frequency Provider Last Rate    acetaminophen  650 mg Oral Q6H PRN Lillian Montoya Medei, CRNP      acetaminophen  650 mg Oral Q4H PRN Lillian Montoya Medei, CRNP      acetaminophen  975 mg Oral Q6H PRN Lillian Montoya Medei, CRNP      aluminum-magnesium hydroxide-simethicone  30 mL Oral Q4H PRN Lillian Montoya Medei, CRNP      haloperidol lactate  2.5 mg Intramuscular Q6H PRN Max 4/day Lillian Montoya Medei, CRNP      And    LORazepam  1 mg Intramuscular Q6H PRN Max 4/day Lillian Montoya Medei, CRNP      And    benztropine  0.5 mg Intramuscular Q6H PRN Max 4/day Lillian Montoya Medei, CRNP      haloperidol lactate  5 mg Intramuscular Q4H PRN Max 4/day Lillian Montoya Medei, CRNP      And    LORazepam  2 mg Intramuscular Q4H PRN Max 4/day Lillian Montoya Medei, CRNP      And    benztropine  1 mg Intramuscular Q4H PRN Max 4/day Lillian Montoya Medei, CRNP      benztropine  1 mg Oral Q6H PRN Lillian Montoya Medei, CRNP      chlorproMAZINE  100 mg Oral BID [facility] Migel Boudreaux MD      Or    chlorproMAZINE  50 mg Intramuscular BID [facility] Migel Boudreaux [place]      [START ON 1/12/2024] cholecalciferol  1,000 Units Oral Daily Jeanette L Dagjose luisl, PA-C      cyanocobalamin  1,000 mcg Oral Daily Jeanette L Dagstiven, PA-C      Diclofenac Sodium  2 g Topical 4x Daily PRN Oxana Gonzalez MD      ergocalciferol  50,000 Units Oral Weekly Jeanette OMAYRA López, PA-C      haloperidol  2 mg Oral Q4H PRN Max 6/day Beatriz Potwin Medei, CRNP      haloperidol  5 mg Oral Q6H PRN Max 4/day Beatriz Potwin Medei, CRNP      haloperidol  5 mg Oral Q4H PRN Max 4/day Beatriz Potwin Medei, CRNP      hydrOXYzine HCL  25 mg Oral Q6H PRN Max 4/day Beatriz Potwin Medei, CRNP      hydrOXYzine HCL  50 mg Oral Q4H PRN Max 4/day Beatriz Potwin Medei, CRNP      Or    LORazepam  1 mg Intramuscular Q4H PRN Beatriz Potwin Medei, CRNP      LORazepam  1 mg Oral Q8H PRN Beatriz Potwin Medei, CRNP      Or    LORazepam  2 mg Intramuscular Q8H PRN Beatriz Potwin Medei, CRNP      melatonin  6 mg Oral HS Yehuda Dominguez MD      nicotine polacrilex  4 mg Oral Q2H PRN Oxana Gonzalez MD      OLANZapine  15 mg Oral BID Yehuda Dominguez MD      Or    OLANZapine  7.5 mg Intramuscular BID Yehuda Dominguez MD      polyethylene glycol  17 g Oral Daily PRN Beatriz Potwin Medei, CRNP      sodium chloride  2 spray Each Nare Q1H PRN Jeanette López PA-C      traZODone  150 mg Oral HS PRN Beatriz Potwin Medei, CRNP         Risks, benefits and possible side effects of Medications:   Risks, benefits, and possible side effects of medications explained to patient and patient verbalizes understanding.       Dual Antipsychotic Rationale: Due to chronicity of mental illness and failure to respond to antipsychotic monotherapy    Inpatient Psychiatric Certification: Based upon physical, mental and social evaluations, I certify that inpatient psychiatric services are medically necessary for this patient for a duration of >2 midnights for the treatment of Mood disorder [facility])  Available alternative community resources do not meet the patient's mental health care needs. I further attest that an established written individualized plan of care has been implemented and is outlined in the patient's medical records. Counseling/Coordination of Care    Total unit time spent today was greater than 15 minutes. Greater than 50% of total time was spent with the patient and/or patient's relatives and/or coordination of patient's care. Patient's rights, confidentiality, exceptions to confidentiality, use of electronic medical record including appropriate staff access to medical record regarding behavioral health services and consent to treatment were reviewed. Pancho Puente,     This note was completed in part utilizing [facility] [address]. Grammatical, translation, syntax errors, random word insertions, spelling mistakes, and incomplete sentences may be an occasional consequence of this system secondary to software limitations with voice recognition, ambient noise, and hardware issues. If you have any questions or concerns about the content, text, or information contained within the body of this dictation, please contact the provider for clarification. Note Share: This note was not shared with the patient due to reasonable likelihood of causing patient harm.

## 2023-11-19 NOTE — NURSING NOTE
Patient seen in milieu, intermittently social with peers. Seen wearing a mask. He reports having an "okay" week, states that he has been trying to just keep to himself to avoid coming off as "aggressive" as he recognizes he is easily frustrated with "authority figures". Patient spent a significant about of time talking to this writer about family dynamics, expresses some trauma from losing his father at a young age, states he had to teach himself to be a man. Reports he has accepted his admission despite his belief it is based on false allegations by his brother. Patient reports that he loves his mother and would never hurt her. He denies pain, anxiety, depression, or unexplained anger. Reports he utilizes masks and earplugs to decrease stimulation from "disgusting smells" and "annoying noises". He denies hallucinations. Compliant with medication and mouth checks. Q7 minute checks ongoing.

## 2023-11-19 NOTE — NURSING NOTE
Pt visible on unit. Irritable edge with redirection. Pt observed shadow boxing in hallways. Argumentative with redirection. Irritability and dismissive. Low frustration tolerance. Needs limit setting. Pt needs prompting during mouth checks for compliance. No prn medications administered this shift. Safety precautions maintained. Will continue to monitor and assess.

## 2023-11-20 PROCEDURE — 99232 SBSQ HOSP IP/OBS MODERATE 35: CPT | Performed by: HOSPITALIST

## 2023-11-20 RX ORDER — CHLORPROMAZINE HYDROCHLORIDE 25 MG/1
75 TABLET, FILM COATED ORAL 3 TIMES DAILY
Status: DISCONTINUED | OUTPATIENT
Start: 2023-11-20 | End: 2023-11-28 | Stop reason: HOSPADM

## 2023-11-20 RX ORDER — CHLORPROMAZINE HYDROCHLORIDE 25 MG/ML
50 INJECTION INTRAMUSCULAR 3 TIMES DAILY
Status: DISCONTINUED | OUTPATIENT
Start: 2023-11-20 | End: 2023-11-28 | Stop reason: HOSPADM

## 2023-11-20 RX ADMIN — CHLORPROMAZINE HYDROCHLORIDE 75 MG: 25 TABLET, FILM COATED ORAL at 20:34

## 2023-11-20 RX ADMIN — Medication 6 MG: at 20:35

## 2023-11-20 RX ADMIN — OLANZAPINE 15 MG: 5 TABLET, ORALLY DISINTEGRATING ORAL at 20:35

## 2023-11-20 RX ADMIN — CHLORPROMAZINE HYDROCHLORIDE 100 MG: 100 TABLET, FILM COATED ORAL at 08:46

## 2023-11-20 RX ADMIN — CHLORPROMAZINE HYDROCHLORIDE 75 MG: 25 TABLET, FILM COATED ORAL at 17:14

## 2023-11-20 RX ADMIN — TRAZODONE HYDROCHLORIDE 150 MG: 150 TABLET ORAL at 00:27

## 2023-11-20 RX ADMIN — CYANOCOBALAMIN TAB 500 MCG 1000 MCG: 500 TAB at 08:46

## 2023-11-20 RX ADMIN — POLYETHYLENE GLYCOL 3350 17 G: 17 POWDER, FOR SOLUTION ORAL at 09:01

## 2023-11-20 RX ADMIN — OLANZAPINE 15 MG: 5 TABLET, ORALLY DISINTEGRATING ORAL at 08:46

## 2023-11-20 NOTE — PROGRESS NOTES
11/20/23   Team Meeting   Meeting Type Daily Rounds   Team Members Present   Team Members Present Physician;Nurse;   Physician Team Member Dr Valarie Carmichael MD; Marium Ring; Dr Ajay Ruiz MD   Nursing Team Member CATALINA Guy   Patient/Family Present   Patient Present No   Patient's Family Present No     304 hearing today 2pm, zyprexa and thorazine, defensive, irritable, labile, no roommate, threatening behaviors can't reality test HI.

## 2023-11-20 NOTE — PROGRESS NOTES
Progress Note - [address] 25 y.o. male MRN: [id number]   Unit/Bed#: -01 Encounter: [id number]    Assessment:    1) Mood disorder r/o bipolar disorder  2) Autism spectrum disorder  3) History of oppositional defiant disorder      Plan:    - Increase thorazine from 100 mg BID to thorazine 75 mg TID   - Continue zyprexa 15 mg PO BID or zyprexa 7.5 mg IM inj BID      Behavior over the last 24 hours: unchanged. Kathye Eye seen today. Per staff report, pt appears internally preoccupied on the unit with liable mood and affect. Pt required trazodone 100 mg PRN for insomnia last night. Pt remains compliant with medications. Patient appears irritable upon interview in the hallway today. Pt reports frustration at his continued stay and does not believe that he has been aggressive towards staff and other patients but admits to lashing out at others if provoked. He also believes that his brother lied to get him admitted to the hospital. He denied wanting to hurt his family but was evasive asked about his brother specifically. He denied SI/AH/VH. Shows poor insight into clinical condition.       Sleep: normal  Appetite: normal  Medication side effects: none reported      Mental Status Evaluation:    Appearance:  age appropriate, casually dressed, marginal hygiene   Behavior:  gesturing, cooperative, agitated at times   Speech:  normal rate and volume, minimally tangential    Mood:  irritable   Affect:  labile   Thought Process:  Coherent, goal directed, somewhat circumstantial   Associations: intact associations   Thought Content:  some paranoia, negative thinking   Perceptual Disturbances: does not appear responding to internal stimuli, denies auditory or visual hallucinations when asked   Risk Potential: Suicidal ideation - None at present  Homicidal ideation - None at present   Sensorium:  oriented to person, place, and time/date         Memory:  recent and remote memory grossly intact Consciousness:  alert and awake      Attention: attention span and concentration appear shorter than expected for age      Insight:  poor      Judgment: poor      Gait/Station: normal gait/station      Motor Activity: no abnormal movements     Vital signs in last 24 hours:    Temp:  [96.8 °F (36 °C)-97.2 °F (36.2 °C)] 97.2 °F (36.2 °C)  HR:  [90-99] 90  Resp:  [18] 18  BP: (129-157)/(77-79) 129/79    Laboratory results: I have personally reviewed all pertinent laboratory/tests results.   Most Recent Labs:   Lab Results   Component Value Date    WBC 6.43 11/07/2023    RBC 5.56 11/07/2023    HGB 16.0 11/07/2023    HCT 50.3 (H) 11/07/2023     11/07/2023    RDW 14.4 11/07/2023    NEUTROABS 3.50 11/07/2023    SODIUM 138 11/07/2023    K 3.5 11/07/2023     11/07/2023    CO2 31 11/07/2023    BUN 9 11/07/2023    CREATININE 0.87 11/07/2023    GLUC 81 11/07/2023    GLUF 81 11/07/2023    CALCIUM 9.6 11/07/2023    AST 27 11/07/2023    ALT 34 11/07/2023    ALKPHOS 99 11/07/2023    TP 7.4 11/07/2023    ALB 4.3 11/07/2023    TBILI 0.56 11/07/2023    CHOLESTEROL 125 11/07/2023    HDL 43 11/07/2023    TRIG 82 11/07/2023    LDLCALC 66 11/07/2023    NONHDLC 82 11/07/2023    ODH9HHQCLQLY 1.331 11/07/2023    HGBA1C 5.7 (H) 11/07/2023     11/07/2023         Assessment/Plan   Principal Problem:    Mood disorder ([address]) r/o Bipolar D/o  Active Problems:    Autism spectrum disorder    Continuous cannabis use    History of oppositional defiant disorder      Treatment Plan:      All current active medications have been reviewed  Encourage group therapy, milieu therapy and occupational therapy  Continue treatment with group therapy, milieu therapy and occupational therapy  Behavioral Health checks every 7 minutes  Current Facility-Administered Medications   Medication Dose Route Frequency Provider Last Rate    acetaminophen  650 mg Oral Q6H PRN Ardena Eisenmenger Medei, CRNP      acetaminophen  650 mg Oral Q4H PRN Robel Mendoza CRNP      acetaminophen  975 mg Oral Q6H PRN Viry Tukr Medei, CRNP      aluminum-magnesium hydroxide-simethicone  30 mL Oral Q4H PRN Viry Turk Medei, CRNP      haloperidol lactate  2.5 mg Intramuscular Q6H PRN Max 4/day Viry Turk Medei, CRNP      And    LORazepam  1 mg Intramuscular Q6H PRN Max 4/day Viry Turk Medei, CRNP      And    benztropine  0.5 mg Intramuscular Q6H PRN Max 4/day Debora M Medei, CRNP      haloperidol lactate  5 mg Intramuscular Q4H PRN Max 4/day Viry Turk Medei, CRNP      And    LORazepam  2 mg Intramuscular Q4H PRN Max 4/day Viry Turk Medei, CRNP      And    benztropine  1 mg Intramuscular Q4H PRN Max 4/day Viry Turk Medei, CRNP      benztropine  1 mg Oral Q6H PRN Viry Turk Medei, CRNP      chlorproMAZINE  100 mg Oral BID Sofia White MD      Or    chlorproMAZINE  50 mg Intramuscular BID Sofia White MD      [START ON 1/12/2024] cholecalciferol  1,000 Units Oral Daily Jeanette López PA-C      cyanocobalamin  1,000 mcg Oral Daily Jeanette López PA-C      Diclofenac Sodium  2 g Topical 4x Daily PRN Addie Juan MD      ergocalciferol  50,000 Units Oral Weekly Jeanetteangelita López PA-C      haloperidol  2 mg Oral Q4H PRN Max 6/day Debora M Will, CRNP      haloperidol  5 mg Oral Q6H PRN Max 4/day Viry Turk Medei, CRNP      haloperidol  5 mg Oral Q4H PRN Max 4/day Viry Turk Medei, CRNP      hydrOXYzine HCL  25 mg Oral Q6H PRN Max 4/day Viry Turk Medei, CRNP      hydrOXYzine HCL  50 mg Oral Q4H PRN Max 4/day Viry Turk Medei, CRNP      Or    LORazepam  1 mg Intramuscular Q4H PRN Viry Turk Medei, CRNP      LORazepam  1 mg Oral Q8H PRN Viry Turk Medei, CRNP      Or    LORazepam  2 mg Intramuscular Q8H PRN Viry Turk Medei, CRNP      melatonin  6 mg Oral HS Sofia White MD      nicotine polacrilex  4 mg Oral Q2H PRN Addie Juan MD      OLANZapine  15 mg Oral BID [facility] Merlinda Lofts, MD      Or    OLANZapine  7.5 mg Intramuscular BID [facility] Prashanth Pina MD      polyethylene glycol  17 g Oral Daily PRN Emanuel Kawasaki Medei, CRNP      sodium chloride  2 spray Each Nare Q1H PRN Jeanette López PA-C      traZODone  150 mg Oral HS PRN Emanuel Kawasaki Medei, CRNP         Risks / Benefits of Treatment:    Risks, benefits, and possible side effects of medications explained to patient and patient verbalizes understanding and agreement for treatment. Counseling / Coordination of Care: Total floor / unit time spent today 25 minutes. Greater than 50% of total time was spent with the patient and / or family counseling and / or coordination of care. A description of counseling / coordination of care:  Patient's progress discussed with staff in treatment team meeting. Medications, treatment progress and treatment plan reviewed with patient.     Jolynn Aparicio MD 11/20/23

## 2023-11-20 NOTE — SOCIAL WORK
Hearing Documentation    304 hearing held today;  in attendance:  [address] Consuel[address] Pedersen - ;  [address] PD office; staff psych; ;  pt;  [phone] recommendation upheld for up to an additional 90 days of inpt treatment at [facility];  304 expires: 2/18/24

## 2023-11-20 NOTE — NURSING NOTE
Visible on the unit, isolated to self. Appears to be internally preoccupied. Cooperative with staff, able to make needs known. Compliant with medication. When asked pt stated,   "I'm fine now go away." Pt's mood and affect were labile. No episode of argumentative behavior. Speech pattern normal and relaxed. Eye contact was fair. Coherency was circumstantial. Pt's appearance and hygiene was improved. Made no c/o pain or discomfort. Pt is resting in his room. 7 minute safety checks continued.

## 2023-11-20 NOTE — PLAN OF CARE
Problem: Ineffective Coping  Goal: Participates in unit activities  Description: Interventions:  - Provide therapeutic environment   - Provide required programming   - Redirect inappropriate behaviors   Outcome: Progressing  Goal: Free from restraint events  Description: - Utilize least restrictive measures   - Provide behavioral interventions   - Redirect inappropriate behaviors   Outcome: Progressing     Problem: Risk for Violence/Aggression Toward Others  Goal: Treatment Goal: Refrain from acts of violence/aggression during length of stay, and demonstrate improved impulse control at the time of discharge  Outcome: Progressing  Goal: Refrain from harming others  Outcome: Progressing  Goal: Refrain from destructive acts on the environment or property  Outcome: Progressing   See chart note

## 2023-11-20 NOTE — PROGRESS NOTES
Progress Note - Michell Ann 25 y.o. male MRN: [id number]    Unit/Bed#: [facility] 222-01 Encounter: [id number]        Subjective:   Patient seen and examined at bedside after reviewing the chart and discussing the case with the caring staff. Patient reports no acute symptoms. Physical Exam   Vitals: Blood pressure 129/79, pulse 90, temperature (!) 97.2 °F (36.2 °C), temperature source Temporal, resp. rate 18, height 5' 8" (1.727 m), weight 79.7 kg (175 lb 9.6 oz), SpO2 98 %. ,Body mass index is 26.7 kg/m². Constitutional: Patient in no acute distress. HEENT: PERR, EOMI, MMM, neck supple. Cardiovascular: Normal rate, regular rhythm. Pulmonary/Chest: Lungs clear bilaterally. Abdomen: Non distended. Assessment/Plan:  Michell Ann is a(n) 25y.o. year old male with mood disorder. 1.  Arthralgia/headache. Patient may get Tylenol as needed. 2.  Insomnia. Patient may take trazodone as needed. 3.  Prediabetes. Hgb A1c 5.7% on 11/7/23. Lifestyle modifications. 4.  Vitamin B12 deficiency. Patient started on vitamin B12 supplement. 5.  Vitamin D deficiency. Patient started on vitamin D2 65223 units weekly x 10 weeks followed by vitamin D3 1000 units daily. 6.  Tobacco abuse. Patient may request nicotine gum as needed. The patient was discussed with Dr. Alexus Gomez and he is in agreement with the above note.

## 2023-11-20 NOTE — SOCIAL WORK
Sw reminded pt of 304 hearing today 2pm, pt would like to attend. Pt presents irritable, started to shadow box while walking away from sw.

## 2023-11-20 NOTE — NURSING NOTE
Bed: 43  Expected date:   Expected time:   Means of arrival:   Comments:  TRIAGE   Patient was irritable and restless. Patient selectively social with staff and peers. Staff support provided. Q 7 minute safety checks maintained. Patient denied SI/HI. Patient told staff he just wants to be discharged. Patient compliant with medications and groups. Patient calmer in the afternoon. Staff will continue to monitor and support.

## 2023-11-20 NOTE — NURSING NOTE
Patient had difficulty falling asleep requested a PRN medication did sleep with minimal interruption/ disturbed but has been sleeping without interruption the rest of the night. No s/s of distress.  Monitoring continues

## 2023-11-21 PROCEDURE — 99232 SBSQ HOSP IP/OBS MODERATE 35: CPT | Performed by: HOSPITALIST

## 2023-11-21 RX ORDER — HYDROCORTISONE 25 MG/G
CREAM TOPICAL 4 TIMES DAILY PRN
Status: DISCONTINUED | OUTPATIENT
Start: 2023-11-21 | End: 2023-11-28 | Stop reason: HOSPADM

## 2023-11-21 RX ADMIN — OLANZAPINE 15 MG: 5 TABLET, ORALLY DISINTEGRATING ORAL at 08:27

## 2023-11-21 RX ADMIN — CHLORPROMAZINE HYDROCHLORIDE 75 MG: 25 TABLET, FILM COATED ORAL at 08:26

## 2023-11-21 RX ADMIN — CHLORPROMAZINE HYDROCHLORIDE 75 MG: 25 TABLET, FILM COATED ORAL at 20:42

## 2023-11-21 RX ADMIN — CHLORPROMAZINE HYDROCHLORIDE 75 MG: 25 TABLET, FILM COATED ORAL at 17:00

## 2023-11-21 RX ADMIN — CYANOCOBALAMIN TAB 500 MCG 1000 MCG: 500 TAB at 08:26

## 2023-11-21 RX ADMIN — Medication 6 MG: at 20:42

## 2023-11-21 RX ADMIN — TRAZODONE HYDROCHLORIDE 75 MG: 150 TABLET ORAL at 20:46

## 2023-11-21 RX ADMIN — OLANZAPINE 15 MG: 5 TABLET, ORALLY DISINTEGRATING ORAL at 20:43

## 2023-11-21 NOTE — PROGRESS NOTES
Progress Note - Abel Chaudhry 25 y.o. male MRN: [id number]    Unit/Bed#: [facility] 222-01 Encounter: [id number]        Subjective:   Patient seen and examined at bedside after reviewing the chart and discussing the case with the caring staff. Patient complaining of a sore throat. He states he has been using cough drops but not providing much relief. Patient also requesting cream for hemorrhoids. Physical Exam   Vitals: Blood pressure 132/92, pulse 88, temperature (!) 97.4 °F (36.3 °C), temperature source Temporal, resp. rate 18, height 5' 8" (1.727 m), weight 79.7 kg (175 lb 9.6 oz), SpO2 99 %. ,Body mass index is 26.7 kg/m². Constitutional: Patient in no acute distress. HEENT: PERR, EOMI, MMM, neck supple. Cardiovascular: Normal rate, regular rhythm. Pulmonary/Chest: Lungs clear bilaterally. Abdomen: Non distended. Assessment/Plan:  Abel Chaudhry is a(n) 25y.o. year old male with mood disorder. 1.  Arthralgia/headache. Patient may get Tylenol as needed. 2.  Insomnia. Patient may take trazodone as needed. 3.  Prediabetes. Hgb A1c 5.7% on 11/7/23. Lifestyle modifications. 4.  Vitamin B12 deficiency. Patient started on vitamin B12 supplement. 5.  Vitamin D deficiency. Patient started on vitamin D2 03838 units weekly x 10 weeks followed by vitamin D3 1000 units daily. 6.  Tobacco abuse. Patient may request nicotine gum as needed. 7.  Sore throat. Patient may use Chloraseptic spray as needed. 8.  Hemorrhoids. Apply Anusol cream as needed. The patient was discussed with Dr. Arnold Veronica and he is in agreement with the above note.

## 2023-11-21 NOTE — NURSING NOTE
Pt pleasant and interacting appropriately with staff and peers in morning and afternoon. In evening hours patient mood labile. Increasingly agitates. Easily frustrated. Threatening during 1700 medication administration pt states, "you force me to take this shit karma is a bitch" Argumentative. Confrontational. When patient redirected and prompted for mouth checks pt began shadow boxing and making hand gestures towards RN in hallway. Safety precautions maintained. Will continue to monitor and assess.

## 2023-11-21 NOTE — PROGRESS NOTES
Progress Note - [address] 25 y.o. male MRN: [id number]   Unit/Bed#: U 222-01 Encounter: [id number]    Assessment:    1) Mood disorder r/o bipolar disorder  2) Autism spectrum disorder  3) History of oppositional defiant disorder      Plan:    - Continue thorazine 75 mg PO TID or thorazine 50 mg IM inj TID  - Continue zyprexa 15 mg PO BID or zyprexa 7.5 mg IM inj BID         Behavior over the last 24 hours: some improvement. Yanna Villarreal seen today. Per staff report, pt has been irritable and restless, most notably yesterday when he was told he was getting a roommate . Pt required trazodone 150 mg PO PRN last night at approximately 1:00AM. Pt remains compliant with medications but refuses to initiate a mood stabilizer. Patient is pleasant and cooperative during interview today. Pt reports improved mood aside from some frustration regarding a new roommate on the unit. He is able to speak more neutrally regarding his brother as compared to yesterday and recognizes that he needs to better control his anger and impulsivity in order to be deemed safe for discharge. Discussed talking through his emotions rather than lashing out. Denies SI/HI/AVH. Shows mildly improved insight into clinical condition.       Sleep: restless sleep  Appetite: normal  Medication side effects: none reported      Mental Status Evaluation:    Appearance:  age appropriate, casually dressed, marginal hygiene   Behavior:  cooperative, calm   Speech:  normal rate and volume   Mood:  improved   Affect:  appropriate   Thought Process:  coherent, goal directed, linear   Associations: intact associations   Thought Content:  no overt delusions   Perceptual Disturbances: does not appear responding to internal stimuli   Risk Potential: Suicidal ideation - None at present  Homicidal ideation - None at present   Sensorium:  oriented to person, place, and time/date         Memory:  recent and remote memory grossly intact Consciousness:  alert and awake      Attention: attention span and concentration are age appropriate      Insight:  improving      Judgment: limited      Gait/Station: normal gait/station      Motor Activity: no abnormal movements     Vital signs in last 24 hours:    Temp:  [97.4 °F (36.3 °C)-97.6 °F (36.4 °C)] 97.4 °F (36.3 °C)  HR:  [88-93] 88  Resp:  [18] 18  BP: (132-165)/(92) 132/92    Laboratory results: I have personally reviewed all pertinent laboratory/tests results.   Most Recent Labs:   Lab Results   Component Value Date    WBC 6.43 11/07/2023    RBC 5.56 11/07/2023    HGB 16.0 11/07/2023    HCT 50.3 (H) 11/07/2023     11/07/2023    RDW 14.4 11/07/2023    NEUTROABS 3.50 11/07/2023    SODIUM 138 11/07/2023    K 3.5 11/07/2023     11/07/2023    CO2 31 11/07/2023    BUN 9 11/07/2023    CREATININE 0.87 11/07/2023    GLUC 81 11/07/2023    GLUF 81 11/07/2023    CALCIUM 9.6 11/07/2023    AST 27 11/07/2023    ALT 34 11/07/2023    ALKPHOS 99 11/07/2023    TP 7.4 11/07/2023    ALB 4.3 11/07/2023    TBILI 0.56 11/07/2023    CHOLESTEROL 125 11/07/2023    HDL 43 11/07/2023    TRIG 82 11/07/2023    LDLCALC 66 11/07/2023    NONHDLC 82 11/07/2023    [id number] 1.331 11/07/2023    HGBA1C 5.7 (H) 11/07/2023     11/07/2023         Assessment/Plan   Principal Problem:    Mood disorder ([address]) r/o Bipolar D/o  Active Problems:    Autism spectrum disorder    Continuous cannabis use    History of oppositional defiant disorder      Treatment Plan:      All current active medications have been reviewed  Encourage group therapy, milieu therapy and occupational therapy  Continue treatment with group therapy, milieu therapy and occupational therapy  Behavioral Health checks every 7 minutes  Current Facility-Administered Medications   Medication Dose Route Frequency Provider Last Rate    acetaminophen  650 mg Oral Q6H PRN JAYA Lawler      acetaminophen  650 mg Oral Q4H PRN JAYA Avery acetaminophen  975 mg Oral Q6H PRN Viry Brunt Medei, CRNP      aluminum-magnesium hydroxide-simethicone  30 mL Oral Q4H PRN Viry Brunt Medei, CRNP      haloperidol lactate  2.5 mg Intramuscular Q6H PRN Max 4/day Viry Brunt Medei, CRNP      And    LORazepam  1 mg Intramuscular Q6H PRN Max 4/day Viry Brunt Medei, CRNP      And    benztropine  0.5 mg Intramuscular Q6H PRN Max 4/day Debora M Medei, CRNP      haloperidol lactate  5 mg Intramuscular Q4H PRN Max 4/day Viry Brunt Medei, CRNP      And    LORazepam  2 mg Intramuscular Q4H PRN Max 4/day Viry Brunt Medei, CRNP      And    benztropine  1 mg Intramuscular Q4H PRN Max 4/day Viry Brunt Medei, CRNP      benztropine  1 mg Oral Q6H PRN Viry Brunt Medei, CRNP      chlorproMAZINE  75 mg Oral TID Javid Hernandez MD      Or    chlorproMAZINE  50 mg Intramuscular TID Javid Hernandez MD      [START ON 1/12/2024] cholecalciferol  1,000 Units Oral Daily Jeanette OMAYRA Dagnall PA-C      cyanocobalamin  1,000 mcg Oral Daily Jeanette OMAYRA López PA-C      Diclofenac Sodium  2 g Topical 4x Daily PRN Matias Hensley MD      ergocalciferol  50,000 Units Oral Weekly Jeanette OMAYRA Dagstiven PA-C      haloperidol  2 mg Oral Q4H PRN Max 6/day Debora M Medei, CRNP      haloperidol  5 mg Oral Q6H PRN Max 4/day Viry Brunt Medei, CRNP      haloperidol  5 mg Oral Q4H PRN Max 4/day Viry Brunt Medei, CRNP      hydrOXYzine HCL  25 mg Oral Q6H PRN Max 4/day Viry Brunt Medei, CRNP      hydrOXYzine HCL  50 mg Oral Q4H PRN Max 4/day Viry Brunt Medei, CRNP      Or    LORazepam  1 mg Intramuscular Q4H PRN Viry Brunt Medei, CRNP      LORazepam  1 mg Oral Q8H PRN Viry Brunt Medei, CRNP      Or    LORazepam  2 mg Intramuscular Q8H PRN Viry Brunt Medei, CRNP      melatonin  6 mg Oral HS Kayley Klein MD      nicotine polacrilex  4 mg Oral Q2H PRN Matias Hensley MD      OLANZapine  15 mg Oral BID Kayley Klein MD      Or    OLANZapine  7.5 mg Intramuscular BID Kayley Klein MD      polyethylene glycol  17 g Oral Daily PRN GOPAL UngerNP      sodium chloride  2 spray Each Nare Q1H PRN Jeanette López PA-C      traZODone  150 mg Oral HS PRN JAYA Unger         Risks / Benefits of Treatment:    Risks, benefits, and possible side effects of medications explained to patient and patient verbalizes understanding and agreement for treatment. Counseling / Coordination of Care: Total floor / unit time spent today 25 minutes. Greater than 50% of total time was spent with the patient and / or family counseling and / or coordination of care. A description of counseling / coordination of care:  Patient's progress discussed with staff in treatment team meeting. Medications, treatment progress and treatment plan reviewed with patient.     Devora Rangel MD 11/21/23

## 2023-11-21 NOTE — NURSING NOTE
Patient noted to be visible on the milieu and social w/ select peers. Denied SI/HI/AVH, depression, and anxiety. Patient noted to be irritable/agitated in the late evening r/t news of having a roommate. Patient paced the halls yelling "why would they give me a roommate, I don't like sleeping in a room with a stranger now I am definitely not going to sleep tonight!" Patient paced the xiao back and forth until he was able to calm down and return to his room. No further complaints nor agitation noted at this time. Q 7 min safety checks continuous and maintained.

## 2023-11-22 PROCEDURE — 99232 SBSQ HOSP IP/OBS MODERATE 35: CPT | Performed by: HOSPITALIST

## 2023-11-22 RX ORDER — OLANZAPINE 10 MG/2ML
7.5 INJECTION, POWDER, FOR SOLUTION INTRAMUSCULAR
Status: DISCONTINUED | OUTPATIENT
Start: 2023-11-22 | End: 2023-11-28 | Stop reason: HOSPADM

## 2023-11-22 RX ORDER — OLANZAPINE 10 MG/1
20 TABLET, ORALLY DISINTEGRATING ORAL
Status: DISCONTINUED | OUTPATIENT
Start: 2023-11-22 | End: 2023-11-28 | Stop reason: HOSPADM

## 2023-11-22 RX ORDER — OLANZAPINE 10 MG/2ML
7.5 INJECTION, POWDER, FOR SOLUTION INTRAMUSCULAR DAILY
Status: DISCONTINUED | OUTPATIENT
Start: 2023-11-23 | End: 2023-11-27

## 2023-11-22 RX ADMIN — CYANOCOBALAMIN TAB 500 MCG 1000 MCG: 500 TAB at 08:27

## 2023-11-22 RX ADMIN — Medication 6 MG: at 21:35

## 2023-11-22 RX ADMIN — TRAZODONE HYDROCHLORIDE 150 MG: 150 TABLET ORAL at 22:34

## 2023-11-22 RX ADMIN — CHLORPROMAZINE HYDROCHLORIDE 75 MG: 25 TABLET, FILM COATED ORAL at 08:27

## 2023-11-22 RX ADMIN — OLANZAPINE 15 MG: 5 TABLET, ORALLY DISINTEGRATING ORAL at 08:27

## 2023-11-22 RX ADMIN — CHLORPROMAZINE HYDROCHLORIDE 75 MG: 25 TABLET, FILM COATED ORAL at 16:15

## 2023-11-22 RX ADMIN — CHLORPROMAZINE HYDROCHLORIDE 75 MG: 25 TABLET, FILM COATED ORAL at 21:35

## 2023-11-22 RX ADMIN — OLANZAPINE 20 MG: 10 TABLET, ORALLY DISINTEGRATING ORAL at 21:35

## 2023-11-22 NOTE — NURSING NOTE
Patient focused on food & seeking out frequent snacks, staff attempted to explain risks of continual consumption of snacks due to side effects of medication. Patient became confrontational, argumentative, & rude to staff. Patient walked away from staff doing hand gestures in the hallway towards staff.

## 2023-11-22 NOTE — PROGRESS NOTES
Progress Note - Traci Yanes 25 y.o. male MRN: [id number]    Unit/Bed#: [address]  793-92 Encounter: [id number]        Subjective:   Patient seen and examined at bedside after reviewing the chart and discussing the case with the caring staff. Patient complaining of eczema and requesting something for it. He otherwise denies any complaints. Physical Exam   Vitals: Blood pressure 137/80, pulse 81, temperature 97.5 °F (36.4 °C), temperature source Temporal, resp. rate 17, height 5' 8" (1.727 m), weight 79.7 kg (175 lb 9.6 oz), SpO2 98 %. ,Body mass index is 26.7 kg/m². Constitutional: Patient in no acute distress. HEENT: PERR, EOMI, MMM, neck supple. Cardiovascular: Normal rate, regular rhythm. Pulmonary/Chest: Lungs clear bilaterally. Abdomen: Non distended. Assessment/Plan:  Traci Yanes is a(n) 25y.o. year old male with mood disorder. 1.  Arthralgia/headache. Patient may get Tylenol as needed. 2.  Insomnia. Patient may take trazodone as needed. 3.  Prediabetes. Hgb A1c 5.7% on 11/7/23. Lifestyle modifications. 4.  Vitamin B12 deficiency. Patient started on vitamin B12 supplement. 5.  Vitamin D deficiency. Patient started on vitamin D2 05751 units weekly x 10 weeks followed by vitamin D3 1000 units daily. 6.  Tobacco abuse. Patient may request nicotine gum as needed. 7.  Sore throat. Patient may use Chloraseptic spray as needed. 8.  Hemorrhoids. Apply Anusol cream as needed. 9.  Eczema. Apply hydrocortisone cream as needed. The patient was discussed with Dr. Vipul Martin and he is in agreement with the above note.

## 2023-11-22 NOTE — NURSING NOTE
Patient pleasant and cooperative. He slept 4-5 hours last night. He took all of his oral medications as ordered. Denied anxiety,depression,SI,HI, or hallucinations. He is visible on the unit. Q 7 minute safety checks maintained.

## 2023-11-22 NOTE — PROGRESS NOTES
11/22/23    Team Meeting   Meeting Type Daily Rounds   Team Members Present   Team Members Present Physician;Nurse;   Physician Team Member Dr Linda Guillen MD; Robert Morales; Dr Vitaliy Liriano MD   Nursing Team Member Siria zayas, CATALINA   Social Work Team Member Abiola Avina [place]   Patient/Family Present   Patient Present No   Patient's Family Present No     Low frustration tolerance, rude, entitled, fixated on food, does not tolerate boundaries, poor sleep, only took half of trazodone, manic, upset over meds, med comp, family meeting, shadow boxing and gun signals to nurse. Dc next week.

## 2023-11-22 NOTE — PROGRESS NOTES
11/21/23   Team Meeting   Meeting Type Tx Team Meeting   Team Members Present   Team Members Present Physician;Nurse;   Physician Team Member Dr. Aysha Levine MD   Nursing Team Member [facility]  RN   Social Work Team Member Sydnee ARECHIGA   Patient/Family Present   Patient Present No   Patient's Family Present No     No prn's needed. Upset about roommate, but greeted roommate. PM pt exp's more bx's.

## 2023-11-22 NOTE — PLAN OF CARE
Problem: Ineffective Coping  Goal: Demonstrates healthy coping skills  Outcome: Progressing  Goal: Participates in unit activities  Description: Interventions:  - Provide therapeutic environment   - Provide required programming   - Redirect inappropriate behaviors   Outcome: Progressing     Problem: Risk for Violence/Aggression Toward Others  Goal: Refrain from harming others  Outcome: Progressing

## 2023-11-22 NOTE — SOCIAL WORK
Finalized 304 results received form [employer] [email or web address], copy on pt chart, copy sent to scanning, pt notified.

## 2023-11-22 NOTE — NURSING NOTE
Patient noted to be visible on the milieu and social w/ select peers. Denied SI/HI/AVH, depression, and anxiety. Cooperative and pleasant throughout interaction. No agitation nor irritability noted in the late evening. Requested and utilized partial prn Trazodone 75 mg @ hs as opposed to 150 mg. Compliant w/ hs med regimen. Offers no complaints. Q 7 min safety checks continuous and maintained.

## 2023-11-22 NOTE — NURSING NOTE
Pt. Pacing the hallways, doing shadow boxing moves then went back to his room. Q 7 minute checks maintained.

## 2023-11-22 NOTE — PROGRESS NOTES
Progress Note - [address] 25 y.o. male MRN: [id number]   Unit/Bed#: -02 Encounter: [id number]    Assessment:    1) Mood disorder r/o bipolar disorder   2) Autism spectrum disorder  3) History of oppositional defiant disorder       Plan:    - Continue thorazine 75 mg PO TID or thorazine 50 mg IM inj TID   - Continue zyprexa 15 mg in the morning and increase zyprexa to 20 mg at night to address increased agitation in the evening      Behavior over the last 24 hours: unchanged. Tayla Carolyn seen today. Per staff report, pt was cooperative and social during the day but became irritable and confrontational overnight with frequent awakenings. Pt required trazodone 75 mg PRN. Pt remains compliant with medications. Patient is cooperative and mildly hyperverbal during interview today. Pt reports improved mood and appears to minimize his confrontations with nursing staff overnight. He admits to decreased need for sleep and education was provided regarding the relationship between his sleep and daytime behaviors. He appears less agitated when speaking about his family and understands he needs to better control his oppositional behaviors within the home. Denies SI/HI/AVH. Shows partial insight into condition with limited judgement.        Sleep: restless sleep  Appetite: increased  Medication side effects: none reported        Mental Status Evaluation:    Appearance:  age appropriate, casually dressed, adequate grooming   Behavior:  cooperative, more pleasant, more redirectable   Speech:  normal rate and volume, hypertalkative   Mood:  improved   Affect:  reactive   Thought Process:  Goal directed, linear, at times tangential    Associations: intact associations   Thought Content:  no overt delusions   Perceptual Disturbances: denies auditory hallucinations when asked, does not appear responding to internal stimuli   Risk Potential: Suicidal ideation - None at present, contracts for safety on the unit  Homicidal ideation - None at present   Sensorium:  oriented to person, place, and time/date         Memory:  recent and remote memory grossly intact      Consciousness:  alert and awake      Attention: attention span and concentration are age appropriate      Insight:  partial      Judgment: impaired      Gait/Station: normal gait/station      Motor Activity: no abnormal movements     Vital signs in last 24 hours:    Temp:  [97.5 °F (36.4 °C)] 97.5 °F (36.4 °C)  HR:  [] 81  Resp:  [17-18] 17  BP: (137-151)/(80-87) 137/80    Laboratory results: I have personally reviewed all pertinent laboratory/tests results.   Most Recent Labs:   Lab Results   Component Value Date    WBC 6.43 11/07/2023    RBC 5.56 11/07/2023    HGB 16.0 11/07/2023    HCT 50.3 (H) 11/07/2023     11/07/2023    RDW 14.4 11/07/2023    NEUTROABS 3.50 11/07/2023    SODIUM 138 11/07/2023    K 3.5 11/07/2023     11/07/2023    CO2 31 11/07/2023    BUN 9 11/07/2023    CREATININE 0.87 11/07/2023    GLUC 81 11/07/2023    GLUF 81 11/07/2023    CALCIUM 9.6 11/07/2023    AST 27 11/07/2023    ALT 34 11/07/2023    ALKPHOS 99 11/07/2023    TP 7.4 11/07/2023    ALB 4.3 11/07/2023    TBILI 0.56 11/07/2023    CHOLESTEROL 125 11/07/2023    HDL 43 11/07/2023    TRIG 82 11/07/2023    LDLCALC 66 11/07/2023    NONHDLC 82 11/07/2023    [id number] 1.331 11/07/2023    HGBA1C 5.7 (H) 11/07/2023     11/07/2023         Assessment/Plan   Principal Problem:    Mood disorder ([address]) r/o Bipolar D/o  Active Problems:    Autism spectrum disorder    Continuous cannabis use    History of oppositional defiant disorder      Treatment Plan:      All current active medications have been reviewed  Encourage group therapy, milieu therapy and occupational therapy  Continue treatment with group therapy, milieu therapy and occupational therapy  Behavioral Health checks every 7 minutes  Current Facility-Administered Medications   Medication Dose Route Frequency Provider Last Rate    acetaminophen  650 mg Oral Q6H PRN Quyen Maxcy Medei, CRNP      acetaminophen  650 mg Oral Q4H PRN Quyen Maxcy HOSP DR. CARRIE MORATAYA, CRNP      acetaminophen  975 mg Oral Q6H PRN Quyen Maxcy Medei, CRNP      aluminum-magnesium hydroxide-simethicone  30 mL Oral Q4H PRN Quyen Maxcy Medei, CRNP      haloperidol lactate  2.5 mg Intramuscular Q6H PRN Max 4/day Quyen Maxcy Medei, CRNP      And    LORazepam  1 mg Intramuscular Q6H PRN Max 4/day Quyen Maxcy Medei, CRNP      And    benztropine  0.5 mg Intramuscular Q6H PRN Max 4/day Debora M Medei, CRNP      haloperidol lactate  5 mg Intramuscular Q4H PRN Max 4/day Quyen Maxcy Medei, CRNP      And    LORazepam  2 mg Intramuscular Q4H PRN Max 4/day Quyen Maxcy Medei, CRNP      And    benztropine  1 mg Intramuscular Q4H PRN Max 4/day Quyen Maxcy Medei, CRNP      benztropine  1 mg Oral Q6H PRN Quyen Maxcy Medei, CRNP      chlorproMAZINE  75 mg Oral TID Pippa Payne MD      Or    chlorproMAZINE  50 mg Intramuscular TID Pippa Payne MD      [START ON 1/12/2024] cholecalciferol  1,000 Units Oral Daily Jeanetteangelita López PA-C      cyanocobalamin  1,000 mcg Oral Daily Jeanette OMAYRA López PA-C      Diclofenac Sodium  2 g Topical 4x Daily PRN Juan Jose Conte MD      ergocalciferol  50,000 Units Oral Weekly Jeanette OMAYRA López PA-C      haloperidol  2 mg Oral Q4H PRN Max 6/day Debora M Medei, CRNP      haloperidol  5 mg Oral Q6H PRN Max 4/day Quyen Maxcy Medei, CRNP      haloperidol  5 mg Oral Q4H PRN Max 4/day Quyen Maxcy Medei, CRNP      hydrocortisone   Topical 4x Daily PRN Jeanette L Dagnall PA-C      hydrOXYzine HCL  25 mg Oral Q6H PRN Max 4/day Debora M Medei, CRNP      hydrOXYzine HCL  50 mg Oral Q4H PRN Max 4/day Quyen Maxcy Medei, CRNP      Or    LORazepam  1 mg Intramuscular Q4H PRN Quyen Vallejocy Medei, CRNP      LORazepam  1 mg Oral Q8H PRN Quyen Vallejocy Medei, CRNP      Or    LORazepam  2 mg Intramuscular Q8H PRN Quyen Benavides Medei, CRNP      melatonin  6 mg Oral  Gabriel Duran MD nicotine polacrilex  4 mg Oral Q2H PRN Yuridia Arizmendi MD      OLANZapine  15 mg Oral BID [facility] Celestino Chambers MD      Or    OLANZapine  7.5 mg Intramuscular BID [facility] Celestino Chambers MD      phenol  1 spray Mouth/Throat Q2H PRN Jeanette López PA-C      polyethylene glycol  17 g Oral Daily PRN [facility]      sodium chloride  2 spray Each Nare Q1H PRN Jeanette López PA-C      traZODone  150 mg Oral HS PRN [facility]         Risks / Benefits of Treatment:    Risks, benefits, and possible side effects of medications explained to patient and patient verbalizes understanding and agreement for treatment. Counseling / Coordination of Care: Total floor / unit time spent today 25 minutes. Greater than 50% of total time was spent with the patient and / or family counseling and / or coordination of care. A description of counseling / coordination of care:  Patient's progress discussed with staff in treatment team meeting. Medications, treatment progress and treatment plan reviewed with patient.     Hoa Campos MD 11/22/23

## 2023-11-22 NOTE — NURSING NOTE
Patient presents with irritable edge when verbally redirected from sitting on the floor by entrance doors doodling, staff attempted to ask patient if he could go in his room or further up the hallway but patient continued to state to staff "shut up, just shut the fuck up". Irritable & confrontational. Q 7 minute checks maintained.

## 2023-11-23 PROCEDURE — 99232 SBSQ HOSP IP/OBS MODERATE 35: CPT | Performed by: PSYCHIATRY & NEUROLOGY

## 2023-11-23 RX ADMIN — CHLORPROMAZINE HYDROCHLORIDE 75 MG: 25 TABLET, FILM COATED ORAL at 08:36

## 2023-11-23 RX ADMIN — TRAZODONE HYDROCHLORIDE 150 MG: 150 TABLET ORAL at 21:24

## 2023-11-23 RX ADMIN — Medication 6 MG: at 21:23

## 2023-11-23 RX ADMIN — OLANZAPINE 20 MG: 10 TABLET, ORALLY DISINTEGRATING ORAL at 21:24

## 2023-11-23 RX ADMIN — CYANOCOBALAMIN TAB 500 MCG 1000 MCG: 500 TAB at 08:36

## 2023-11-23 RX ADMIN — CHLORPROMAZINE HYDROCHLORIDE 75 MG: 25 TABLET, FILM COATED ORAL at 17:19

## 2023-11-23 RX ADMIN — OLANZAPINE 15 MG: 5 TABLET, ORALLY DISINTEGRATING ORAL at 08:37

## 2023-11-23 RX ADMIN — ERGOCALCIFEROL 50000 UNITS: 1.25 CAPSULE, LIQUID FILLED ORAL at 08:37

## 2023-11-23 RX ADMIN — CHLORPROMAZINE HYDROCHLORIDE 75 MG: 25 TABLET, FILM COATED ORAL at 21:19

## 2023-11-23 NOTE — PROGRESS NOTES
Progress Note - [address] [facility] [address] 25 y.o. male MRN: [id number]   Unit/Bed#: [address] 222-02 Encounter: [id number]    Behavior over the last 24 hours: unchanged. Bonita Fontana is a 20-year-old male diagnosed with ASD, history of oppositional defiant disorder, marijuana abuse, currently mood disorder w/ rule out bipolar disorder seen today in follow-up. On approach is generally bright and forthcoming with writer. He still endorses that he did not threaten his mother, however does mention that he was taking high dosages of THC gummies and states that this may have impacted his ability to think clearly. Does endorse ongoing familial conflict with his brother who he presumes is, "jealous" of patient. Per staff, noted that he is less irritable and verbally aggressive. States that he does feel slightly better with combination of of Thorazine and Zyprexa. He denies all other psychiatric symptoms. Sleep: slept better ; slept 6 hours last evening  Appetite: normal  Medication side effects: No   ROS: all other systems are negative    Mental Status Evaluation:    Appearance:  age appropriate, casually dressed, in regular attire, clean.  Hair tied in high ponytail   Behavior:  cooperative, calm, fourth coming   Speech:  normal rate, normal volume, normal pitch   Mood:  euthymic, "better"   Affect:  brighter, mood-congruent   Thought Process:  goal directed, linear   Associations: intact associations   Thought Content:  Some underlying paranoia regarding patients brother   Perceptual Disturbances: no auditory hallucinations, no visual hallucinations   Risk Potential: Suicidal ideation - None at present  Homicidal ideation - None at present  Potential for aggression - No   Sensorium:  oriented to person, place, and time/date   Memory:  recent and remote memory grossly intact   Consciousness:  alert and awake   Attention/Concentration: attention span and concentration are age appropriate   Insight:  improving and limited   Judgment: limited   Gait/Station: normal gait/station   Motor Activity: no abnormal movements     Vital signs in last 24 hours:    Temp:  [98 °F (36.7 °C)-98.6 °F (37 °C)] 98.6 °F (37 °C)  HR:  [80-92] 80  Resp:  [18] 18  BP: (144-149)/(73-76) 144/76    Laboratory results: I have personally reviewed all pertinent laboratory/tests results    Results from the past 24 hours: No results found for this or any previous visit (from the past 24 hour(s)). Most Recent Labs:   Lab Results   Component Value Date    WBC 6.43 11/07/2023    RBC 5.56 11/07/2023    HGB 16.0 11/07/2023    HCT 50.3 (H) 11/07/2023     11/07/2023    RDW 14.4 11/07/2023    NEUTROABS 3.50 11/07/2023    SODIUM 138 11/07/2023    K 3.5 11/07/2023     11/07/2023    CO2 31 11/07/2023    BUN 9 11/07/2023    CREATININE 0.87 11/07/2023    GLUC 81 11/07/2023    CALCIUM 9.6 11/07/2023    AST 27 11/07/2023    ALT 34 11/07/2023    ALKPHOS 99 11/07/2023    TP 7.4 11/07/2023    ALB 4.3 11/07/2023    TBILI 0.56 11/07/2023    CHOLESTEROL 125 11/07/2023    HDL 43 11/07/2023    TRIG 82 11/07/2023    LDLCALC 66 11/07/2023    NONHDLC 82 11/07/2023    [id number] 1.331 11/07/2023    HGBA1C 5.7 (H) 11/07/2023     11/07/2023       Progress Toward Goals: progressing    Assessment/Plan   Principal Problem:    Mood disorder ([address]) r/o Bipolar D/o  Active Problems:    Autism spectrum disorder    Continuous cannabis use    History of oppositional defiant disorder      Recommended Treatment:     Planned medication and treatment changes:     All current active medications have been reviewed  Encourage group therapy, milieu therapy and occupational therapy  Behavioral Health checks every 7 minutes    Continue current medications    Current Facility-Administered Medications   Medication Dose Route Frequency Provider Last Rate    acetaminophen  650 mg Oral Q6H PRN JAYA Fan      acetaminophen  650 mg Oral Q4H PRN JAYA Rider acetaminophen  975 mg Oral Q6H PRN Aniya Savant Medei, CRNP      aluminum-magnesium hydroxide-simethicone  30 mL Oral Q4H PRN Aniya Savant Medei, CRNP      haloperidol lactate  2.5 mg Intramuscular Q6H PRN Max 4/day Aniya Savant Medei, CRNP      And    LORazepam  1 mg Intramuscular Q6H PRN Max 4/day Aniya Savant Medei, CRNP      And    benztropine  0.5 mg Intramuscular Q6H PRN Max 4/day Aniya Savant Medei, CRNP      haloperidol lactate  5 mg Intramuscular Q4H PRN Max 4/day Aniya Savant Medei, CRNP      And    LORazepam  2 mg Intramuscular Q4H PRN Max 4/day Aniya Savant Medei, CRNP      And    benztropine  1 mg Intramuscular Q4H PRN Max 4/day Aniya Savant Medei, CRNP      benztropine  1 mg Oral Q6H PRN Aniya Savant Medei, CRNP      chlorproMAZINE  75 mg Oral TID Luz Castañeda MD      Or    chlorproMAZINE  50 mg Intramuscular TID Luz Castañeda MD      [START ON 1/12/2024] cholecalciferol  1,000 Units Oral Daily Jeanette L Dagnall, PA-C      cyanocobalamin  1,000 mcg Oral Daily Jeanette L Dagnall, PA-C      Diclofenac Sodium  2 g Topical 4x Daily PRN Celio Pradhan MD      ergocalciferol  50,000 Units Oral Weekly Jeanette L Dagnall, PA-C      haloperidol  2 mg Oral Q4H PRN Max 6/day Aniya Savant Medei, CRNP      haloperidol  5 mg Oral Q6H PRN Max 4/day Aniya Savant Medei, CRNP      haloperidol  5 mg Oral Q4H PRN Max 4/day Aniya Savant Medei, CRNP      hydrocortisone   Topical 4x Daily PRN Jeanette L Dagnall, PA-C      hydrocortisone   Topical 4x Daily PRN Jeanette L Dagnall, PA-C      hydrOXYzine HCL  25 mg Oral Q6H PRN Max 4/day Edbora M Medei, CRNP      hydrOXYzine HCL  50 mg Oral Q4H PRN Max 4/day Aniya Savant Medei, CRNP      Or    LORazepam  1 mg Intramuscular Q4H PRN Aniya Savant Medei, CRNP      LORazepam  1 mg Oral Q8H PRN JAYA Gonzalez      Or    LORazepam  2 mg Intramuscular Q8H PRN JAYA Gonzalez      melatonin  6 mg Oral HS Prashant Neumann MD      nicotine polacrilex  4 mg Oral Q2H PRN Celio Pradhan MD      OLANZapine  15 mg Oral Daily Chrys Libman, MD      Or    OLANZapine  7.5 mg Intramuscular Daily Chrys Libman, MD      OLANZapine  20 mg Oral HS Chrys Libman, MD      Or    OLANZapine  7.5 mg Intramuscular HS Chrys Libman, MD      phenol  1 spray Mouth/Throat Q2H PRN Jeanette López PA-C      polyethylene glycol  17 g Oral Daily PRN Twilla Coho Medei, CRNP      sodium chloride  2 spray Each Nare Q1H PRN Jeanette López PA-C      traZODone  150 mg Oral HS PRN Twilla Coho Medolivier, CRNP       Risks / Benefits of Treatment:    Risks, benefits, and possible side effects of medications explained to patient and patient verbalizes understanding and agreement for treatment. Counseling / Coordination of Care: Total floor / unit time spent today 20 minutes. Greater than 50% of total time was spent with the patient and / or family counseling and / or coordination of care. A description of counseling / coordination of care:  Patient's progress discussed with staff in treatment team meeting. Medications, treatment progress and treatment plan reviewed with patient.     Ranjan Acuna PA-C 11/23/23

## 2023-11-23 NOTE — NURSING NOTE
Patient visible out and about in the community. He was initially resistant to medication but then did take PO Zyprexa willingly.

## 2023-11-23 NOTE — NURSING NOTE
Patient observed within the milieu, socializing with peers for most of the evening. Despite ongoing irritability with limit setting or select peers, patient is overall less irritable despite him stating "I feel the same". Less verbal aggression noted. More polite and forward thinking. Reports discussing expectations when he returns home with his mother, reports feeling "pretty good" about it. Denies SI/HI and hallucinations. Less internally preoccupied. Requested PRN trazodone  at 2234, patient seemingly asleep at this time. No other needs identified. Q7 minute montioring ongoing.

## 2023-11-23 NOTE — PLAN OF CARE
Problem: Ineffective Coping  Goal: Identifies healthy coping skills  Outcome: Progressing  Goal: Demonstrates healthy coping skills  Outcome: Progressing

## 2023-11-23 NOTE — NURSING NOTE
Pt is cooperative & compliant with medications. Pt is social with other patients. Pt has an irritable edge @ times. Pt denies any depression or anxiety. Pt denies any hallucinations, suicidal or homicidal ideations. Q 7 min checks maintained to monitor pt's behavior & safety.

## 2023-11-23 NOTE — NURSING NOTE
Patient slept ~6 hrs. Improved sleep. Non-labored breathing noted when resting. Q7 minute checks ongoing.

## 2023-11-24 PROCEDURE — 99232 SBSQ HOSP IP/OBS MODERATE 35: CPT | Performed by: PSYCHIATRY & NEUROLOGY

## 2023-11-24 RX ADMIN — TRAZODONE HYDROCHLORIDE 150 MG: 150 TABLET ORAL at 20:55

## 2023-11-24 RX ADMIN — CHLORPROMAZINE HYDROCHLORIDE 75 MG: 25 TABLET, FILM COATED ORAL at 17:00

## 2023-11-24 RX ADMIN — Medication 6 MG: at 20:51

## 2023-11-24 RX ADMIN — CHLORPROMAZINE HYDROCHLORIDE 75 MG: 25 TABLET, FILM COATED ORAL at 20:51

## 2023-11-24 RX ADMIN — OLANZAPINE 15 MG: 5 TABLET, ORALLY DISINTEGRATING ORAL at 08:53

## 2023-11-24 RX ADMIN — OLANZAPINE 20 MG: 10 TABLET, ORALLY DISINTEGRATING ORAL at 20:51

## 2023-11-24 RX ADMIN — CYANOCOBALAMIN TAB 500 MCG 1000 MCG: 500 TAB at 08:52

## 2023-11-24 RX ADMIN — CHLORPROMAZINE HYDROCHLORIDE 75 MG: 25 TABLET, FILM COATED ORAL at 08:52

## 2023-11-24 NOTE — PROGRESS NOTES
11/24/23   Team Meeting   Meeting Type Daily Rounds   Team Members Present   Team Members Present Physician;Nurse;   Physician Team Member Dr Winter Flaherty MD; Alanna Benites; Dr Roma Vargas MD   Nursing Team Member Gab Irby, [place]   Social Work Team Member Bossman Levine, [place]   Patient/Family Present                 nadeen Reveles therapist   Patient Present No   Patient's Family Present No     Less overtly irritable, will dc home with [facility] follow up, improved sleep, bizarre, mouth checks comp.

## 2023-11-24 NOTE — NURSING NOTE
Pt visible on unit. Pleasant on approach. No irritability or agitation observed this shift. Pt needs prompting during mouth checks for compliance. No prn medications administered this shift. Safety precautions maintained. Will continue to monitor and assess.

## 2023-11-24 NOTE — NURSING NOTE
Patient visible in the milieu amongst peers. Polite and pleasant on approach. Continues to report positive outlook and thinking this evening. Irritable only when limits are set with unit expectations. Denies SI/HI and hallucinations. Noted somewhat improved sleep last night. Denies depression, endorses mild anxiety regarding ongoing discharge plan. No needs identified. PO medications taken as ordered, q7 minute checks ongoing.

## 2023-11-24 NOTE — PROGRESS NOTES
Progress Note - [address] 25 y.o. male MRN: [id number]   Unit/Bed#: [facility] 222-02 Encounter: [id number]    Assessment:    1) Mood disorder r/o bipolar disorder    2) Autism spectrum disorder  3) History of oppositional defiant disorder       Plan:    - Continue thorazine 75 mg PO TID or thorazine 50 mg IM inj TID    - Continue zyprexa 15 mg in the morning and zyprexa 20 mg at night  - Can consider discharge next week if patient continues to show improvement      Behavior over the last 24 hours: improving. Daivs Solano seen today. Per staff report, pt has been less irritable and confrontational, social with peers and amenable to redirection when prompted. Pt required trazodone 150 mg PO PRN for insomnia overnight. Pt remains compliant with medications. Patient is calm, and cooperative today. Pt reports subjective increased control of his irritability. He has been sleeping roughly six hours a night, which is an improvement from the beginning of his stay. Has been in contact with his mother and is setting ground rules for his return home. Discussed coping mechanisms for when his anger escalates. Denies SI/HI/AVH. Shows fair insight with moderately improved judgement.              Sleep: improved  Appetite: normal  Medication side effects: none reported      Mental Status Evaluation:    Appearance:  age appropriate, dressed appropriately   Behavior:  cooperative, calm   Speech:  normal rate and volume   Mood:  improved   Affect:  appropriate   Thought Process:  logical, goal directed   Associations: intact associations   Thought Content:  no overt delusions   Perceptual Disturbances: no auditory hallucinations, no visual hallucinations, denies auditory hallucinations when asked   Risk Potential: Suicidal ideation - None at present  Homicidal ideation - None at present   Sensorium:  oriented to person, place, and time/date         Memory:  recent and remote memory grossly intact Consciousness:  alert and awake      Attention: attention span and concentration are age appropriate      Insight:  partial      Judgment: improving      Gait/Station: normal gait/station      Motor Activity: no abnormal movements     Vital signs in last 24 hours:    Temp:  [97.5 °F (36.4 °C)] 97.5 °F (36.4 °C)  HR:  [] 86  Resp:  [18] 18  BP: (135-155)/(82-86) 155/82    Laboratory results: I have personally reviewed all pertinent laboratory/tests results.   Most Recent Labs:   Lab Results   Component Value Date    WBC 6.43 11/07/2023    RBC 5.56 11/07/2023    HGB 16.0 11/07/2023    HCT 50.3 (H) 11/07/2023     11/07/2023    RDW 14.4 11/07/2023    NEUTROABS 3.50 11/07/2023    SODIUM 138 11/07/2023    K 3.5 11/07/2023     11/07/2023    CO2 31 11/07/2023    BUN 9 11/07/2023    CREATININE 0.87 11/07/2023    GLUC 81 11/07/2023    GLUF 81 11/07/2023    CALCIUM 9.6 11/07/2023    AST 27 11/07/2023    ALT 34 11/07/2023    ALKPHOS 99 11/07/2023    TP 7.4 11/07/2023    ALB 4.3 11/07/2023    TBILI 0.56 11/07/2023    CHOLESTEROL 125 11/07/2023    HDL 43 11/07/2023    TRIG 82 11/07/2023    LDLCALC 66 11/07/2023    NONHDLC 82 11/07/2023    [id number] 1.331 11/07/2023    HGBA1C 5.7 (H) 11/07/2023     11/07/2023         Assessment/Plan   Principal Problem:    Mood disorder ([address]) r/o Bipolar D/o  Active Problems:    Autism spectrum disorder    Continuous cannabis use    History of oppositional defiant disorder      Treatment Plan:      All current active medications have been reviewed  Encourage group therapy, milieu therapy and occupational therapy  Continue treatment with group therapy, milieu therapy and occupational therapy  Behavioral Health checks every 7 minutes  Current Facility-Administered Medications   Medication Dose Route Frequency Provider Last Rate    acetaminophen  650 mg Oral Q6H PRN JAYA Walters      acetaminophen  650 mg Oral Q4H PRN JAYA Walters      acetaminophen  975 mg Oral Q6H PRN [facility], CR[facility]      aluminum-magnesium hydroxide-simethicone  30 mL Oral Q4H PRN [facility], CRNP      haloperidol lactate  2.5 mg Intramuscular Q6H PRN Max 4/day [place] Medei, CRNP      And    LORazepam  1 mg Intramuscular Q6H PRN Max 4/day [place] Medei, CRNP      And    benztropine  0.5 mg Intramuscular Q6H PRN Max 4/day [place] Medei, CRNP      haloperidol lactate  5 mg Intramuscular Q4H PRN Max 4/day [place] Medei, CRNP      And    LORazepam  2 mg Intramuscular Q4H PRN Max 4/day [facility] [place] Medei, CRNP      And    benztropine  1 mg Intramuscular Q4H PRN Max 4/day [place] Medei, CRNP      benztropine  1 mg Oral Q6H PRN [facility], CRNP      chlorproMAZINE  75 mg Oral TID Kiesha Mejia MD      Or    chlorproMAZINE  50 mg Intramuscular TID Kiesha Mejia MD      [START ON 1/12/2024] cholecalciferol  1,000 Units Oral Daily Jeanette L Dagnall, PA-C      cyanocobalamin  1,000 mcg Oral Daily Jeanette L Dagnall, PA-C      Diclofenac Sodium  2 g Topical 4x Daily PRN Adria Hollis MD      ergocalciferol  50,000 Units Oral Weekly Jeanette L Dagnall, PA-C      haloperidol  2 mg Oral Q4H PRN Max 6/day [facility] Medei, CRNP      haloperidol  5 mg Oral Q6H PRN Max 4/day [facility] Medei, CRNP      haloperidol  5 mg Oral Q4H PRN Max 4/day [facility] Medei, CRNP      hydrocortisone   Topical 4x Daily PRN Jeanette L Dagnall, PA-C      hydrocortisone   Topical 4x Daily PRN Jeanette L Dagnall, PA-C      hydrOXYzine HCL  25 mg Oral Q6H PRN Max 4/day Debora M Medei, CRNP      hydrOXYzine HCL  50 mg Oral Q4H PRN Max 4/day [facility], CRNP      Or    LORazepam  1 mg Intramuscular Q4H PRN [facility], CRNP      LORazepam  1 mg Oral Q8H PRN [place] [place] Medei, CRNP      Or    LORazepam  2 mg Intramuscular Q8H PRN Lucas Solis, GOPALNP      melatonin  6 mg Oral HS Barby Pickard MD      nicotine polacrilex  4 mg Oral Q2H PRN Adria Hollis MD      OLANZapine  15 mg Oral Daily Kiesha Mejia, MD      Or    OLANZapine  7.5 mg Intramuscular Daily Chyna Martin MD      OLANZapine  20 mg Oral HS Chyna Martin MD      Or    OLANZapine  7.5 mg Intramuscular HS Chyna Martin MD      phenol  1 spray Mouth/Throat Q2H PRN Jeanette López PA-C      polyethylene glycol  17 g Oral Daily PRN JAYA Bermudez      sodium chloride  2 spray Each Nare Q1H PRN Jeanette López PA-C      traZODone  150 mg Oral HS PRN JAYA Bermudez         Risks / Benefits of Treatment:    Risks, benefits, and possible side effects of medications explained to patient and patient verbalizes understanding and agreement for treatment. Counseling / Coordination of Care: Total floor / unit time spent today 25 minutes. Greater than 50% of total time was spent with the patient and / or family counseling and / or coordination of care. A description of counseling / coordination of care:  Patient's progress discussed with staff in treatment team meeting. Medications, treatment progress and treatment plan reviewed with patient.     Chyna Martin MD 11/24/23

## 2023-11-24 NOTE — PROGRESS NOTES
Progress Note - Aster Melgoza 25 y.o. male MRN: [id number]    Unit/Bed#: [facility] 955-64 Encounter: [id number]        Subjective:   Patient seen and examined at bedside after reviewing the chart and discussing the case with the caring staff. Patient has no acute symptoms. Physical Exam   Vitals: Blood pressure 155/82, pulse 86, temperature 97.5 °F (36.4 °C), temperature source Temporal, resp. rate 18, height 5' 8" (1.727 m), weight 79.7 kg (175 lb 9.6 oz), SpO2 99 %. ,Body mass index is 26.7 kg/m². Constitutional: Patient in no acute distress. HEENT: PERR, EOMI, MMM, neck supple. Cardiovascular: Normal rate, regular rhythm. Pulmonary/Chest: Lungs clear bilaterally. Abdomen: Non distended. Assessment/Plan:  Aster Melgoza is a(n) 25y.o. year old male with mood disorder. 1.  Arthralgia/headache. Patient may get Tylenol as needed. 2.  Insomnia. Patient may take trazodone as needed. 3.  Prediabetes. Hgb A1c 5.7% on 11/7/23. Lifestyle modifications. 4.  Vitamin B12 deficiency. Patient started on vitamin B12 supplement. 5.  Vitamin D deficiency. Patient started on vitamin D2 45234 units weekly x 10 weeks followed by vitamin D3 1000 units daily. 6.  Tobacco abuse. Patient may request nicotine gum as needed. 7.  Sore throat. Patient may use Chloraseptic spray as needed. 8.  Hemorrhoids. Apply Anusol cream as needed. 9.  Eczema. Apply hydrocortisone cream as needed. The patient was discussed with Dr. Tatiana Bynum and he is in agreement with the above note.

## 2023-11-24 NOTE — NURSING NOTE
Patient observed asleep/resting throughout a majority of the night during q7 minute checks. Early awakening this morning. Non-labored breathing observed during periods of sleep/rest. Will remain on safety precautions and continual monitoring.

## 2023-11-24 NOTE — SOCIAL WORK
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 Will ask around for transport help at time of dc, is concerned about cost of meds as MA is pending, is hopeful MA will be approved by next month to alleviate financial stressor. Call ended mutually. Tx team, PCM notified of med cost concerned.

## 2023-11-24 NOTE — SOCIAL WORK
Sw met with pt for check in, pt presents calm, controlled, appropriate, denies current Si/Hi/AVH/dep/anx. Agreeable to dc home with [address] follow up-SHANNAN signed. Pt is forward thinking, hopes to complete MA application asap, is hopeful to find a new job soon. [email or web address] contacted regarding PATHs referral.

## 2023-11-25 PROCEDURE — 99232 SBSQ HOSP IP/OBS MODERATE 35: CPT | Performed by: PSYCHIATRY & NEUROLOGY

## 2023-11-25 RX ADMIN — CHLORPROMAZINE HYDROCHLORIDE 75 MG: 25 TABLET, FILM COATED ORAL at 20:39

## 2023-11-25 RX ADMIN — Medication 6 MG: at 21:24

## 2023-11-25 RX ADMIN — CYANOCOBALAMIN TAB 500 MCG 1000 MCG: 500 TAB at 08:05

## 2023-11-25 RX ADMIN — OLANZAPINE 15 MG: 5 TABLET, ORALLY DISINTEGRATING ORAL at 08:05

## 2023-11-25 RX ADMIN — CHLORPROMAZINE HYDROCHLORIDE 75 MG: 25 TABLET, FILM COATED ORAL at 15:51

## 2023-11-25 RX ADMIN — TRAZODONE HYDROCHLORIDE 150 MG: 150 TABLET ORAL at 20:42

## 2023-11-25 RX ADMIN — ACETAMINOPHEN 650 MG: 325 TABLET ORAL at 20:39

## 2023-11-25 RX ADMIN — CHLORPROMAZINE HYDROCHLORIDE 75 MG: 25 TABLET, FILM COATED ORAL at 08:05

## 2023-11-25 RX ADMIN — OLANZAPINE 20 MG: 10 TABLET, ORALLY DISINTEGRATING ORAL at 20:42

## 2023-11-25 NOTE — NURSING NOTE
Patient visible in dayroom. Pleasant and cooperative. No behaviors noted. No irritability noted. Denies SI,HI,AVH, anxiety and depression. Safety checks continue Q 7 minutes.

## 2023-11-25 NOTE — PROGRESS NOTES
Progress Note - Jer Cortez 25 y.o. male MRN: [id number]    Unit/Bed#: [facility] 506-13 Encounter: [id number]        Subjective:   Patient seen and examined at bedside after reviewing the chart and discussing the case with the caring staff. Patient has no acute symptoms. Physical Exam   Vitals: Blood pressure 140/81, pulse 72, temperature 97.6 °F (36.4 °C), temperature source Temporal, resp. rate 18, height 5' 8" (1.727 m), weight 79.7 kg (175 lb 9.6 oz), SpO2 100 %. ,Body mass index is 26.7 kg/m². Constitutional: Patient in no acute distress. HEENT: PERR, EOMI, MMM, neck supple. Cardiovascular: Normal rate, regular rhythm. Pulmonary/Chest: Lungs clear bilaterally. Abdomen: Non distended. Assessment/Plan:  Jer Cortez is a(n) 25y.o. year old male with mood disorder. 1.  Arthralgia/headache. Patient may get Tylenol as needed. 2.  Insomnia. Patient may take trazodone as needed. 3.  Prediabetes. Hgb A1c 5.7% on 11/7/23. Lifestyle modifications. 4.  Vitamin B12 deficiency. Patient started on vitamin B12 supplement. 5.  Vitamin D deficiency. Patient started on vitamin D2 48923 units weekly x 10 weeks followed by vitamin D3 1000 units daily. 6.  Tobacco abuse. Patient may request nicotine gum as needed. 7.  Sore throat. Patient may use Chloraseptic spray as needed. 8.  Hemorrhoids. Apply Anusol cream as needed. 9.  Eczema. Apply hydrocortisone cream as needed. The patient was discussed with Dr. Jose Weller and he is in agreement with the above note.

## 2023-11-25 NOTE — PROGRESS NOTES
Progress Note - [address] 25 y.o. male MRN: @MRN   Unit/Bed#: [facility] 558-77 Encounter: [id number]      Report from staff regarding this patient received and discussed, and records reviewed prior to seeing this patient  Behavior over the last 24 hours: improved. Patient remains appropriate and better today. Sleep: improved  Appetite: fair  Medication side effects: No       Mental Status Evaluation:    Appearance:  dressed appropriately, casually dressed   Mood:  improved, anxious   Affect: appropriate, reactive    Speech:  normal rate and volume, normal pitch, fluent   Thought Content:  normal   Perceptual Disturbances: no auditory hallucinations, no visual hallucinations, denies auditory hallucinations when asked, does not appear responding to internal stimuli   Risk Potential: Suicidal ideation - None, contracts for safety on the unit   Insight:  improving   Motor Activity: no abnormal movements       Laboratory results:  I have personally reviewed all pertinent laboratory results. Progress Toward Goals: improving    Assessment/Plan   Principal Problem:    Mood disorder (HCC) r/o Bipolar D/o  Active Problems:    Autism spectrum disorder    Continuous cannabis use    History of oppositional defiant disorder    Recommended Treatment: Because of the patient condition is improving, he is interested to continue with the same medication management and absence of side effects associated with antipsychotic medications, no tardive dyskinesia symptoms or acute dystonic reactions and the decision was made to continue his current medication management. The writer also provided brief supportive therapy. Planned medication and treatment changes: All current active medications have been reviewed. Continue treatment with group therapy, milieu therapy, occupational therapy and medication management. ** Please Note: This note has been constructed using a voice recognition system. **      BMP: No results for input(s): "NA", "K", "CL", "CO2", "BUN" in the last 72 hours.     Invalid input(s): "CREA", "GLU"  Vitals:    11/25/23 1532   BP: 135/73   Pulse: 95   Resp: 18   Temp: 98 °F (36.7 °C)   SpO2: 97%        Medication Administration - last 24 hours from 11/24/2023 1728 to 11/25/2023 1728         Date/Time Order Dose Route Action Action by     11/25/2023 0805 EST cyanocobalamin (VITAMIN B-12) tablet 1,000 mcg 1,000 mcg Oral Given Amanda Sanders RN     11/24/2023 2055 EST traZODone (DESYREL) tablet 150 mg 150 mg Oral Given Blaine Fairdealing, LPN     73/48/9538 2006 EST melatonin tablet 6 mg -- Oral Canceled Entry Blaine Fairdealing, LPN     01/01/5606 3908 EST melatonin tablet 6 mg 6 mg Oral Given Blaine Fairdealing, LPN     55/64/7072 1159 EST chlorproMAZINE (THORAZINE) tablet 75 mg 75 mg Oral Given Rebekahjudah Sanders RN     11/25/2023 0805 EST chlorproMAZINE (THORAZINE) tablet 75 mg 75 mg Oral Given Amanda Sanders RN     11/24/2023 2051 EST chlorproMAZINE (THORAZINE) tablet 75 mg 75 mg Oral Given Blaine Fairdealing, LPN     10/78/2558 7123 EST chlorproMAZINE (THORAZINE) injection SOLN 50 mg -- Intramuscular See Alternative Amanda Sanders RN     11/25/2023 0805 EST chlorproMAZINE (THORAZINE) injection SOLN 50 mg -- Intramuscular See Alternative Amanda Sanders RN     11/24/2023 2051 EST chlorproMAZINE (THORAZINE) injection SOLN 50 mg -- Intramuscular See Alternative Blaine Joshua, LPN     45/14/1968 5452 EST OLANZapine (ZyPREXA ZYDIS) dispersible tablet 15 mg 15 mg Oral Given Amanda Sanders RN     11/25/2023 0805 EST OLANZapine (ZyPREXA) IM injection 7.5 mg -- Intramuscular See Alternative Amanda Sanders RN     11/24/2023 2200 EST OLANZapine (ZyPREXA ZYDIS) dispersible tablet 20 mg -- Oral Canceled Entry Blaine Lewis, EDWARDN     85/32/2253 9647 EST OLANZapine (ZyPREXA ZYDIS) dispersible tablet 20 mg 20 mg Oral Given Maren Kelley, EDWARDN     50/66/2158 5018 EST OLANZapine (ZyPREXA) IM injection 7.5 mg -- Intramuscular See Osiris Moore LPN     86/20/8733 5137 EST OLANZapine (ZyPREXA) IM injection 7.5 mg -- Intramuscular See Osiris Moore LPN

## 2023-11-26 PROCEDURE — 99232 SBSQ HOSP IP/OBS MODERATE 35: CPT | Performed by: PSYCHIATRY & NEUROLOGY

## 2023-11-26 RX ADMIN — CHLORPROMAZINE HYDROCHLORIDE 75 MG: 25 TABLET, FILM COATED ORAL at 08:46

## 2023-11-26 RX ADMIN — CYANOCOBALAMIN TAB 500 MCG 1000 MCG: 500 TAB at 08:46

## 2023-11-26 RX ADMIN — CHLORPROMAZINE HYDROCHLORIDE 75 MG: 25 TABLET, FILM COATED ORAL at 20:50

## 2023-11-26 RX ADMIN — Medication 6 MG: at 20:51

## 2023-11-26 RX ADMIN — OLANZAPINE 20 MG: 10 TABLET, ORALLY DISINTEGRATING ORAL at 20:50

## 2023-11-26 RX ADMIN — OLANZAPINE 15 MG: 5 TABLET, ORALLY DISINTEGRATING ORAL at 08:45

## 2023-11-26 RX ADMIN — TRAZODONE HYDROCHLORIDE 150 MG: 150 TABLET ORAL at 21:02

## 2023-11-26 RX ADMIN — CHLORPROMAZINE HYDROCHLORIDE 75 MG: 25 TABLET, FILM COATED ORAL at 16:32

## 2023-11-26 NOTE — NURSING NOTE
Pt visible on unit. Pleasant on approach. No irritability or agitation observed this shift. Mouth checks without prompting. Snack and drink seeking but easily redirects. Showered and performed ADL's. No prn medications administered this shift. Safety precautions maintained. Will continue to monitor and assess.

## 2023-11-26 NOTE — PROGRESS NOTES
Progress Note - Wei Lewis 25 y.o. male MRN: [id number]    Unit/Bed#: [address] Encounter: [id number]        Subjective:   Patient seen and examined at bedside after reviewing the chart and discussing the case with the caring staff. Patient has no acute symptoms. Physical Exam   Vitals: Blood pressure 128/85, pulse 78, temperature (!) 97.2 °F (36.2 °C), temperature source Temporal, resp. rate 17, height 5' 8" (1.727 m), weight 83.2 kg (183 lb 6.4 oz), SpO2 98 %. ,Body mass index is 27.89 kg/m². Constitutional: Patient in no acute distress. HEENT: PERR, EOMI, MMM, neck supple. Cardiovascular: Normal rate, regular rhythm. Pulmonary/Chest: Lungs clear bilaterally. Abdomen: Non distended. Assessment/Plan:  Wei Lewis is a(n) 25y.o. year old male with mood disorder. 1.  Arthralgia/headache. Patient may get Tylenol as needed. 2.  Insomnia. Patient may take trazodone as needed. 3.  Prediabetes. Hgb A1c 5.7% on 11/7/23. Lifestyle modifications. 4.  Vitamin B12 deficiency. Patient started on vitamin B12 supplement. 5.  Vitamin D deficiency. Patient started on vitamin D2 68568 units weekly x 10 weeks followed by vitamin D3 1000 units daily. 6.  Tobacco abuse. Patient may request nicotine gum as needed. 7.  Sore throat. Patient may use Chloraseptic spray as needed. 8.  Hemorrhoids. Apply Anusol cream as needed. 9.  Eczema. Apply hydrocortisone cream as needed. The patient was discussed with Dr. Debbie White and he is in agreement with the above note.

## 2023-11-26 NOTE — NURSING NOTE
Patient seen in milieu early in the evening but then went to bed early. He has remained polite and social with peers. Bright on approach. Patient continues to utilize drawing and socializing as a coping skill. He continues to report being agreeable to his mother's rules when he is discharged. Denies SI/HI and hallucinations. He can be bizarre at times, seen talking to himself when alone. No irritability noted. Insight remains somewhat limited, as patient does not seem able to note a difference, however, he is goal oriented and forward thinking. No other needs identified. Q7 minute checks ongoing.

## 2023-11-26 NOTE — NURSING NOTE
Patient received tylenol for 2/10 jaw pain this evening at 2039, patient appears to already be asleep no signs of discomfort noted.

## 2023-11-27 PROCEDURE — 99232 SBSQ HOSP IP/OBS MODERATE 35: CPT | Performed by: PSYCHIATRY & NEUROLOGY

## 2023-11-27 RX ORDER — ERGOCALCIFEROL 1.25 MG/1
50000 CAPSULE ORAL WEEKLY
Qty: 4 CAPSULE | Refills: 0 | Status: SHIPPED | OUTPATIENT
Start: 2023-11-30 | End: 2023-11-28

## 2023-11-27 RX ORDER — OLANZAPINE 20 MG/1
20 TABLET ORAL
Qty: 30 TABLET | Refills: 0 | Status: SHIPPED | OUTPATIENT
Start: 2023-11-27 | End: 2023-11-28 | Stop reason: SDUPTHER

## 2023-11-27 RX ORDER — HYDROCORTISONE 25 MG/G
CREAM TOPICAL 4 TIMES DAILY PRN
Qty: 28 G | Refills: 0 | Status: SHIPPED | OUTPATIENT
Start: 2023-11-27 | End: 2023-11-28

## 2023-11-27 RX ORDER — TRAZODONE HYDROCHLORIDE 150 MG/1
150 TABLET ORAL
Qty: 30 TABLET | Refills: 0 | Status: SHIPPED | OUTPATIENT
Start: 2023-11-27 | End: 2023-11-28 | Stop reason: SDUPTHER

## 2023-11-27 RX ORDER — CHLORPROMAZINE HYDROCHLORIDE 200 MG/1
200 TABLET, FILM COATED ORAL
Qty: 30 TABLET | Refills: 0 | Status: SHIPPED | OUTPATIENT
Start: 2023-11-27 | End: 2023-11-28 | Stop reason: SDUPTHER

## 2023-11-27 RX ORDER — OLANZAPINE 15 MG/1
15 TABLET ORAL EVERY MORNING
Qty: 30 TABLET | Refills: 0 | Status: SHIPPED | OUTPATIENT
Start: 2023-11-27 | End: 2023-11-27

## 2023-11-27 RX ORDER — CHLORPROMAZINE HYDROCHLORIDE 100 MG/1
100 TABLET, FILM COATED ORAL 2 TIMES DAILY
Qty: 60 TABLET | Refills: 0 | Status: SHIPPED | OUTPATIENT
Start: 2023-11-27 | End: 2023-11-27

## 2023-11-27 RX ORDER — OLANZAPINE 20 MG/1
20 TABLET ORAL
Qty: 30 TABLET | Refills: 0 | Status: SHIPPED | OUTPATIENT
Start: 2023-11-27 | End: 2023-11-27 | Stop reason: SDUPTHER

## 2023-11-27 RX ORDER — OLANZAPINE 10 MG/1
10 TABLET ORAL EVERY MORNING
Qty: 30 TABLET | Refills: 0 | Status: SHIPPED | OUTPATIENT
Start: 2023-11-27 | End: 2023-11-28 | Stop reason: SDUPTHER

## 2023-11-27 RX ORDER — OLANZAPINE 10 MG/2ML
7.5 INJECTION, POWDER, FOR SOLUTION INTRAMUSCULAR DAILY
Status: DISCONTINUED | OUTPATIENT
Start: 2023-11-28 | End: 2023-11-28 | Stop reason: HOSPADM

## 2023-11-27 RX ORDER — TRAZODONE HYDROCHLORIDE 150 MG/1
150 TABLET ORAL
Qty: 30 TABLET | Refills: 0 | Status: SHIPPED | OUTPATIENT
Start: 2023-11-27 | End: 2023-11-27 | Stop reason: SDUPTHER

## 2023-11-27 RX ORDER — OLANZAPINE 10 MG/1
10 TABLET, ORALLY DISINTEGRATING ORAL DAILY
Status: DISCONTINUED | OUTPATIENT
Start: 2023-11-28 | End: 2023-11-28 | Stop reason: HOSPADM

## 2023-11-27 RX ORDER — CHLORPROMAZINE HYDROCHLORIDE 200 MG/1
200 TABLET, FILM COATED ORAL
Qty: 30 TABLET | Refills: 0 | Status: SHIPPED | OUTPATIENT
Start: 2023-11-27 | End: 2023-11-27 | Stop reason: SDUPTHER

## 2023-11-27 RX ORDER — MELATONIN
1000 DAILY
Qty: 30 TABLET | Refills: 0 | Status: SHIPPED | OUTPATIENT
Start: 2024-01-12 | End: 2023-11-28

## 2023-11-27 RX ADMIN — TRAZODONE HYDROCHLORIDE 150 MG: 150 TABLET ORAL at 21:33

## 2023-11-27 RX ADMIN — OLANZAPINE 15 MG: 5 TABLET, ORALLY DISINTEGRATING ORAL at 08:38

## 2023-11-27 RX ADMIN — CHLORPROMAZINE HYDROCHLORIDE 75 MG: 25 TABLET, FILM COATED ORAL at 08:38

## 2023-11-27 RX ADMIN — Medication 6 MG: at 21:33

## 2023-11-27 RX ADMIN — CHLORPROMAZINE HYDROCHLORIDE 75 MG: 25 TABLET, FILM COATED ORAL at 21:33

## 2023-11-27 RX ADMIN — OLANZAPINE 20 MG: 10 TABLET, ORALLY DISINTEGRATING ORAL at 21:33

## 2023-11-27 RX ADMIN — CHLORPROMAZINE HYDROCHLORIDE 75 MG: 25 TABLET, FILM COATED ORAL at 17:00

## 2023-11-27 RX ADMIN — CYANOCOBALAMIN TAB 500 MCG 1000 MCG: 500 TAB at 08:38

## 2023-11-27 NOTE — SOCIAL WORK
[phone] meds to [facility] form faxed requesting final cost for meds to be send to unit tomorrow [phone].

## 2023-11-27 NOTE — PLAN OF CARE
Problem: Ineffective Coping  Goal: Participates in unit activities  Description: Interventions:  - Provide therapeutic environment   - Provide required programming   - Redirect inappropriate behaviors   Outcome: Progressing   Attended AM RT group; limited participation.

## 2023-11-27 NOTE — PLAN OF CARE
Problem: Ineffective Coping  Goal: Understands least restrictive measures  Description: Interventions:  - Utilize least restrictive behavior  Outcome: Progressing  Goal: Free from restraint events  Description: - Utilize least restrictive measures   - Provide behavioral interventions   - Redirect inappropriate behaviors   Outcome: Progressing     Problem: Anxiety  Goal: Anxiety is at manageable level  Description: Interventions:  - Assess and monitor patient's anxiety level. - Monitor for signs and symptoms (heart palpitations, chest pain, shortness of breath, headaches, nausea, feeling jumpy, restlessness, irritable, apprehensive). - Collaborate with interdisciplinary team and initiate plan and interventions as ordered.   - [place] patient to unit/surroundings  - Explain treatment plan  - Encourage participation in care  - Encourage verbalization of concerns/fears  - Identify coping mechanisms  - Assist in developing anxiety-reducing skills  - Administer/offer alternative therapies  - Limit or eliminate stimulants  Outcome: Progressing     Problem: Risk for Violence/Aggression Toward Others  Goal: Treatment Goal: Refrain from acts of violence/aggression during length of stay, and demonstrate improved impulse control at the time of discharge  Outcome: Progressing  Goal: Refrain from harming others  Outcome: Progressing  Goal: Refrain from destructive acts on the environment or property  Outcome: Progressing  Goal: Control angry outbursts  Description: Interventions:  - Monitor patient closely, per order  - Ensure early verbal de-escalation  - Monitor prn medication needs  - Set reasonable/therapeutic limits, outline behavioral expectations, and consequences   - Provide a non-threatening milieu, utilizing the least restrictive interventions   Outcome: Progressing  See chart note

## 2023-11-27 NOTE — SOCIAL WORK
Sushma Mccullough [facility] [phone] returned contact to isabella, intake scheduled 12/5 at 10am in person with Bree.

## 2023-11-27 NOTE — NURSING NOTE
Patient med and meal compliant visible on unit, pleasant cooperative social with peers, looking forward to discharge denies SI HI AVH anxiety and depression at this time. Safety checks maintained.

## 2023-11-27 NOTE — SOCIAL WORK
Madie, provider attempted to call Chan Borrego (Mother) [phone], no answer. Madie reviewed intake with [employer], pt agreeable, declined D&A, pcp.

## 2023-11-27 NOTE — NURSING NOTE
Pt visible on unit. Pleasant on approach. No irritability or agitation observed this shift. Mouth checks without prompting. No prn medications administered this shift. Safety precautions maintained. Will continue to monitor and assess.

## 2023-11-27 NOTE — SOCIAL WORK
Madie, provider called  Janet Lobo (Mother) [phone], reviewed progress, tx, med list, OP [facility] follow up, safety plan. Eleuterio to pick pt up tomorrow 12pm. Meds sent to [facility]. Pt agreeable to 12pm dc tomorrow. No concerns voiced regarding dc.

## 2023-11-27 NOTE — PROGRESS NOTES
11/27/23   Team Meeting   Meeting Type Daily Rounds   Team Members Present   Team Members Present Physician;Nurse;   Physician Team Member Dr Effie Lancaster MD; Fannie Tran; Dr Hilda Narayan MD   Nursing Team Member Siria zayas RN   Social Work Team Member Simi Chicas [place]   Patient/Family Present   Patient Present No   Patient's Family Present No     Improved, less irritable, pleasant, cooperative, med comp, calm, improved sleep, mood controlled, will add trazodone to scheduled, meds to beds.

## 2023-11-27 NOTE — NURSING NOTE
Patient visible on the unit. Pleasant and cooperative. Appears anxious but denies any anxiety. No SI//HI, AV/H or depression. Medication compliant. No PRN's required.

## 2023-11-27 NOTE — PROGRESS NOTES
Progress Note - [address] 25 y.o[address] male MRN: [id number]   Unit/Bed#: -02 Encounter: [id number]    Assessment:    1) Mood disorder r/o bipolar disorder  2) Autism spectrum disorder  3) History of oppositional defiant disorder      Plan:    - Continue thorazine 75 mg PO TID or thorazine 50 mg IM inj TID     - Continue zyprexa 15 mg in the morning and zyprexa 20 mg at night  - Will plan for discharge tomorrow or Wednesday pending contact with family for discharge plan completion       Behavior over the last 24 hours: improved. Camacho Slim seen today. Per staff report, pt has been pleasant and cooperative, easily redirectable and continues to deny SI/HI/AVH. Pt required trazodone 150 mg PO PRN for insomnia. Pt remains compliant with medications. Patient is pleasant, calm, and cooperative today during interview. Pt reports that his medication regime has helped control his moods and impulsivity. He shows awareness of the negative impact of his intrusive, threatening and sometimes violent behaviors and understands that he will need to continue his medications upon discharge in order to maintain mood stability and control irritability. Denies SI/HI/AVH. Shows improved insight into clinical condition with improved judgement.         Sleep: improved  Appetite: normal  Medication side effects: none reported      Mental Status Evaluation:    Appearance:  casually dressed, adequate grooming   Behavior:  pleasant, cooperative, calm   Speech:  normal rate and volume   Mood:  euthymic   Affect:  appropriate   Thought Process:  logical, coherent, goal directed   Associations: intact associations   Thought Content:  no overt delusions, ruminating thoughts   Perceptual Disturbances: does not appear responding to internal stimuli, denies auditory or visual hallucinations when asked   Risk Potential: Suicidal ideation - None at present, contracts for safety on the unit  Homicidal ideation - None at present   Sensorium:  oriented to person, place, and time/date         Memory:  recent and remote memory grossly intact      Consciousness:  alert and awake      Attention: attention span and concentration are age appropriate      Insight:  improving      Judgment: improving      Gait/Station: normal gait/station      Motor Activity: no abnormal movements     Vital signs in last 24 hours:    Temp:  [97.5 °F (36.4 °C)-98 °F (36.7 °C)] 97.5 °F (36.4 °C)  HR:  [81-95] 81  Resp:  [17-18] 17  BP: (139-164)/(82-83) 139/83    Laboratory results: I have personally reviewed all pertinent laboratory/tests results.   Most Recent Labs:   Lab Results   Component Value Date    WBC 6.43 11/07/2023    RBC 5.56 11/07/2023    HGB 16.0 11/07/2023    HCT 50.3 (H) 11/07/2023     11/07/2023    RDW 14.4 11/07/2023    NEUTROABS 3.50 11/07/2023    SODIUM 138 11/07/2023    K 3.5 11/07/2023     11/07/2023    CO2 31 11/07/2023    BUN 9 11/07/2023    CREATININE 0.87 11/07/2023    GLUC 81 11/07/2023    GLUF 81 11/07/2023    CALCIUM 9.6 11/07/2023    AST 27 11/07/2023    ALT 34 11/07/2023    ALKPHOS 99 11/07/2023    TP 7.4 11/07/2023    ALB 4.3 11/07/2023    TBILI 0.56 11/07/2023    CHOLESTEROL 125 11/07/2023    HDL 43 11/07/2023    TRIG 82 11/07/2023    LDLCALC 66 11/07/2023    NONHDLC 82 11/07/2023    DXA8DFKZTVLD 1.331 11/07/2023    HGBA1C 5.7 (H) 11/07/2023     11/07/2023         Assessment/Plan   Principal Problem:    Mood disorder ([address]) r/o Bipolar D/o  Active Problems:    Autism spectrum disorder    Continuous cannabis use    History of oppositional defiant disorder      Treatment Plan:      All current active medications have been reviewed  Encourage group therapy, milieu therapy and occupational therapy  Continue treatment with group therapy, milieu therapy and occupational therapy  Behavioral Health checks every 7 minutes  Current Facility-Administered Medications   Medication Dose Route Frequency Provider Last Rate acetaminophen  650 mg Oral Q6H [facility] CRNP      acetaminophen  650 mg Oral Q4H PRN [facility] HOSP DR. CARRIE MORATAYA, CRNP      acetaminophen  975 mg Oral Q6H PRN Aniya Savant Medei, CRNP      aluminum-magnesium hydroxide-simethicone  30 mL Oral Q4H PRN Aniya Savant Medei, CRNP      haloperidol lactate  2.5 mg Intramuscular Q6H PRN Max 4/day Aniya Savant Medei, CRNP      And    LORazepam  1 mg Intramuscular Q6H PRN Max 4/day Aniya Savant Medei, CRNP      And    benztropine  0.5 mg Intramuscular Q6H PRN Max 4/day Aniya Savant Medei, CRNP      haloperidol lactate  5 mg Intramuscular Q4H PRN Max 4/day Aniya Savant Medei, CRNP      And    LORazepam  2 mg Intramuscular Q4H PRN Max 4/day Aniya Savant Medei, CRNP      And    benztropine  1 mg Intramuscular Q4H PRN Max 4/day Aniya Savant Medei, CRNP      benztropine  1 mg Oral Q6H PRN Aniya Savant Medei, CRNP      chlorproMAZINE  75 mg Oral TID Luz Castañeda MD      Or    chlorproMAZINE  50 mg Intramuscular TID Luz Castañeda MD      [START ON 1/12/2024] cholecalciferol  1,000 Units Oral Daily Jeanette L Dagnall, PA-C      cyanocobalamin  1,000 mcg Oral Daily Jeanette L Dagnall, PA-C      Diclofenac Sodium  2 g Topical 4x Daily PRN Celio Pradhan MD      ergocalciferol  50,000 Units Oral Weekly Jeanette L Dagnall, PA-C      haloperidol  2 mg Oral Q4H PRN Max 6/day Aniya Savant Medei, CRNP      haloperidol  5 mg Oral Q6H PRN Max 4/day Aniya Savant Medei, CRNP      haloperidol  5 mg Oral Q4H PRN Max 4/day Aniya Savant Medei, CRNP      hydrocortisone   Topical 4x Daily PRN Jeanette L Dagnall, PA-C      hydrocortisone   Topical 4x Daily PRN Jeanette L Dagnall, PA-C      hydrOXYzine HCL  25 mg Oral Q6H PRN Max 4/day Debora HUFFMAN Medei, CRNP      hydrOXYzine HCL  50 mg Oral Q4H PRN Max 4/day Aniya Savant Medei, CRNP      Or    LORazepam  1 mg Intramuscular Q4H PRN Aniya Savant Medei, CRNP      LORazepam  1 mg Oral Q8H PRN Aniya Savant Medei, CRNP      Or    LORazepam  2 mg Intramuscular Q8H PRN Aniya Savant Medei, CRNP      melatonin  6 mg Oral HS Aida Pradhan MD      nicotine polacrilex  4 mg Oral Q2H PRN Cheri Loomis MD      OLANZapine  15 mg Oral Daily Juan Hill MD      Or    OLANZapine  7.5 mg Intramuscular Daily Juan Hill MD      OLANZapine  20 mg Oral HS Juan Hill MD      Or    OLANZapine  7.5 mg Intramuscular HS Juan Hill MD      phenol  1 spray Mouth/Throat Q2H PRN Jeanette López PA-C      polyethylene glycol  17 g Oral Daily PRN Mare Solis, JAYA      sodium chloride  2 spray Each Nare Q1H PRN Jeanette López PA-C      traZODone  150 mg Oral HS PRN Mare Solis, GOPALNP         Risks / Benefits of Treatment:    Risks, benefits, and possible side effects of medications explained to patient and patient verbalizes understanding and agreement for treatment. Counseling / Coordination of Care: Total floor / unit time spent today 25 minutes. Greater than 50% of total time was spent with the patient and / or family counseling and / or coordination of care. A description of counseling / coordination of care:  Patient's progress discussed with staff in treatment team meeting. Medications, treatment progress and treatment plan reviewed with patient.     Juan Hill MD 11/27/23

## 2023-11-27 NOTE — PROGRESS NOTES
Progress Note - [address] 25 y.o. male MRN: @MRN   Unit/Bed#: [facility] [place] 078-92 Encounter: [id number]      Report from staff regarding this patient received and discussed, and records reviewed prior to seeing this patient  Behavior over the last 24 hours: improved. Patient appears anxious, appropriate, and calm today. Sleep: improved  Appetite: fair  Medication side effects: No       Mental Status Evaluation:    Appearance:  casually dressed   Mood:  anxious   Affect: normal range and intensity, appropriate, reactive    Speech:  normal rate and volume   Thought Content:  normal   Perceptual Disturbances: does not appear responding to internal stimuli   Risk Potential: Suicidal ideation - None, contracts for safety on the unit   Insight:  improved   Motor Activity: no abnormal movements       Laboratory results:  I have personally reviewed all pertinent laboratory results. Progress Toward Goals: improved    Assessment/Plan   Principal Problem:    Mood disorder (HCC) r/o Bipolar D/o  Active Problems:    Autism spectrum disorder    Continuous cannabis use    History of oppositional defiant disorder    Recommended Treatment: We will not make medication adjustment because the patient condition improved. He does not have any side effects of medications and antipsychotics indicated to treat patient refractory mood disorder with agitations and refractory angry reactions. Writer continue providing the patient supportive therapy. Planned medication and treatment changes: All current active medications have been reviewed. Continue treatment with group therapy, milieu therapy, occupational therapy and medication management. ** Please Note: This note has been constructed using a voice recognition system. **      BMP: No results for input(s): "NA", "K", "CL", "CO2", "BUN" in the last 72 hours.     Invalid input(s): "CREA", "GLU"  Vitals:    11/26/23 1543   BP: 164/82   Pulse: 95   Resp: 18   Temp: 98 °F (36.7 °C)   SpO2: 98%        Medication Administration - last 24 hours from 11/25/2023 2112 to 11/26/2023 2112         Date/Time Order Dose Route Action Action by     11/26/2023 0846 EST cyanocobalamin (VITAMIN B-12) tablet 1,000 mcg 1,000 mcg Oral Given Truman Corral, RN     11/26/2023 2102 EST traZODone (DESYREL) tablet 150 mg 150 mg Oral Given Gordan Shingles, LPN     32/05/5534 6973 EST melatonin tablet 6 mg -- Oral Canceled Entry Gordan Shingles, LPN     64/32/5681 8011 EST melatonin tablet 6 mg 6 mg Oral Given Gordan Shingles, LPN     34/31/2073 8331 EST melatonin tablet 6 mg -- Oral Canceled Entry Lilibeth Muse, RN     11/25/2023 2124 EST melatonin tablet 6 mg 6 mg Oral Given Lilibeth Muse, RN     11/26/2023 2050 EST chlorproMAZINE (THORAZINE) tablet 75 mg 75 mg Oral Given Gordan Shingles, LPN     61/91/7743 5870 EST chlorproMAZINE (THORAZINE) tablet 75 mg 75 mg Oral Given Truman Fletcherumbyonathan, RN     11/26/2023 1391 EST chlorproMAZINE (THORAZINE) tablet 75 mg 75 mg Oral Given [facility], RN     11/26/2023 2050 EST chlorproMAZINE (THORAZINE) injection SOLN 50 mg -- Intramuscular See Alternative Tgan Shingles, N     15/95/9778 2863 EST chlorproMAZINE (THORAZINE) injection SOLN 50 mg -- Intramuscular See Alternative Truman Corral, RN     11/26/2023 0846 EST chlorproMAZINE (THORAZINE) injection SOLN 50 mg -- Intramuscular See Alternative Truman Corral, RN     11/26/2023 0845 EST OLANZapine (ZyPREXA ZYDIS) dispersible tablet 15 mg 15 mg Oral Given Truman Fletcherumbyonathan, RN     11/26/2023 0845 EST OLANZapine (ZyPREXA) IM injection 7.5 mg -- Intramuscular See Alternative Truman Corral, RN     11/26/2023 2200 EST OLANZapine (ZyPREXA ZYDIS) dispersible tablet 20 mg -- Oral Canceled Entry Gordan Shingles, LPN     24/33/6169 4284 EST OLANZapine (ZyPREXA ZYDIS) dispersible tablet 20 mg 20 mg Oral Given Gordan Shingles, LPN     46/52/7646 7592 EST OLANZapine (ZyPREXA ZYDIS) dispersible tablet 20 mg -- Oral Canceled Entry Sonal Joana Coleman RN     11/26/2023 2200 EST OLANZapine (ZyPREXA) IM injection 7.5 mg -- Intramuscular See Alternative Abel Vázquez LPN     28/97/3487 0251 EST OLANZapine (ZyPREXA) IM injection 7.5 mg -- Intramuscular See Alternative Abel Vázquez LPN     81/92/0129 9813 EST OLANZapine (ZyPREXA) IM injection 7.5 mg -- Intramuscular See Alternative Gómez Miller RN

## 2023-11-27 NOTE — NUTRITION
11/27/23 1546   Biochemical Data,Medical Tests, and Procedures   Biochemical Data/Medical Tests/Procedures Lab values reviewed; Meds reviewed   Labs (Comment) No new labs   Meds (Comment) cogentin, Vit D, Vit B12, haldol, atarax, ativan, melatonin, zyprexa, desyrel   Nutrition-Focused Physical Exam   Nutrition-Focused Physical Exam Findings RN skin assessment reviewed; No skin issues documented   Medical-Related Concerns PMH reviewed   Adequacy of Intake   Nutrition Modality PO   Feeding Route   PO Independent   Current PO Intake   Current Diet Order Regular, double portions thin liquids   Current Meal Intake %   Estimated calorie intake compared to estimated need Nutrient needs met. PES Statement   Problem Continue previous diagnosis   Recommendations/Interventions   Malnutrition/BMI Present No  (does not meet criteria)   Summary Regular, double portions thin liquids. No supplements. Meal completions 100%. 11/26 183#, BMI=27.89; 24#(15%) weight gain since admission. No new labs. Skin intact. Patient had previous ~80# weight loss over 1 year, now gaining weight back. Continue to monitor weight status as weight gain may become undesirable. No issues with appetite reported. Interventions/Recommendations Continue current diet order   Education Assessment   Education Education not indicated at this time   Patient Nutrition Goals   Goal Wt maintained; Adequate intake   Goal Status Met;Extended   Timeframe to complete goal by next f/u   Nutrition Complexity Risk   Nutrition complexity level Low risk   Nutrition review: 12/11/23   Follow up date 12/11/23

## 2023-11-27 NOTE — PROGRESS NOTES
Progress Note - Liliana Anguiano 25 y.o. male MRN: [id number]    Unit/Bed#: Brittany [facility] 440-46 Encounter: [id number]        Subjective:   Patient seen and examined at bedside after reviewing the chart and discussing the case with the caring staff. Patient has no acute symptoms. Patient is being discharged tomorrow 11/28/2023. Patient is requesting all his prescriptions. I reviewed and reconciled patient's problem list and medications. Physical Exam   Vitals: Blood pressure 139/83, pulse 81, temperature 97.5 °F (36.4 °C), temperature source Temporal, resp. rate 17, height 5' 8" (1.727 m), weight 83.2 kg (183 lb 6.4 oz), SpO2 97 %. ,Body mass index is 27.89 kg/m². Constitutional: Patient in no acute distress. HEENT: PERR, EOMI, MMM, neck supple. Cardiovascular: Normal rate, regular rhythm. Pulmonary/Chest: Lungs clear bilaterally. Abdomen: Non distended. Assessment/Plan:  Liliana Anguiano is a(n) 25y.o. year old male with mood disorder. Medical clearance. Patient is medically cleared for discharge. All scripts will be sent out for the patient. 1.  Arthralgia/headache. Patient may get Tylenol as needed. 2.  Insomnia. Patient may take trazodone as needed. 3.  Prediabetes. Hgb A1c 5.7% on 11/7/23. Lifestyle modifications. 4.  Vitamin B12 deficiency. Patient started on vitamin B12 supplement. 5.  Vitamin D deficiency. Patient started on vitamin D2 17049 units weekly x 10 weeks followed by vitamin D3 1000 units daily. 6.  Tobacco abuse. Patient may request nicotine gum as needed. 7.  Sore throat. Patient may use Chloraseptic spray as needed. 8.  Hemorrhoids. Apply Anusol cream as needed. 9.  Eczema. Apply hydrocortisone cream as needed.

## 2023-11-28 VITALS
HEART RATE: 113 BPM | TEMPERATURE: 98.6 F | BODY MASS INDEX: 27.8 KG/M2 | OXYGEN SATURATION: 98 % | WEIGHT: 183.4 LBS | RESPIRATION RATE: 18 BRPM | SYSTOLIC BLOOD PRESSURE: 153 MMHG | DIASTOLIC BLOOD PRESSURE: 81 MMHG | HEIGHT: 68 IN

## 2023-11-28 PROCEDURE — 99238 HOSP IP/OBS DSCHRG MGMT 30/<: CPT | Performed by: PSYCHIATRY & NEUROLOGY

## 2023-11-28 RX ORDER — OLANZAPINE 10 MG/1
10 TABLET ORAL EVERY MORNING
Qty: 30 TABLET | Refills: 0 | Status: SHIPPED | OUTPATIENT
Start: 2023-12-26 | End: 2023-11-28 | Stop reason: SDUPTHER

## 2023-11-28 RX ORDER — CHLORPROMAZINE HYDROCHLORIDE 25 MG/1
TABLET, FILM COATED ORAL
Status: DISCONTINUED
Start: 2023-11-28 | End: 2023-11-28 | Stop reason: HOSPADM

## 2023-11-28 RX ORDER — TRAZODONE HYDROCHLORIDE 150 MG/1
150 TABLET ORAL
Qty: 30 TABLET | Refills: 0 | Status: SHIPPED | OUTPATIENT
Start: 2023-12-26 | End: 2023-11-28 | Stop reason: SDUPTHER

## 2023-11-28 RX ORDER — OLANZAPINE 10 MG/1
10 TABLET ORAL EVERY MORNING
Qty: 30 TABLET | Refills: 0 | Status: SHIPPED | OUTPATIENT
Start: 2023-11-28

## 2023-11-28 RX ORDER — CHLORPROMAZINE HYDROCHLORIDE 200 MG/1
200 TABLET, FILM COATED ORAL
Qty: 30 TABLET | Refills: 0 | Status: SHIPPED | OUTPATIENT
Start: 2023-11-28

## 2023-11-28 RX ORDER — OLANZAPINE 20 MG/1
20 TABLET ORAL
Qty: 30 TABLET | Refills: 0 | Status: SHIPPED | OUTPATIENT
Start: 2023-12-26 | End: 2023-11-28 | Stop reason: SDUPTHER

## 2023-11-28 RX ORDER — CHLORPROMAZINE HYDROCHLORIDE 200 MG/1
200 TABLET, FILM COATED ORAL
Qty: 30 TABLET | Refills: 0 | Status: SHIPPED | OUTPATIENT
Start: 2023-12-26 | End: 2023-11-28 | Stop reason: SDUPTHER

## 2023-11-28 RX ORDER — TRAZODONE HYDROCHLORIDE 150 MG/1
150 TABLET ORAL
Qty: 30 TABLET | Refills: 0 | Status: SHIPPED | OUTPATIENT
Start: 2023-11-28

## 2023-11-28 RX ORDER — OLANZAPINE 20 MG/1
20 TABLET ORAL
Qty: 30 TABLET | Refills: 0 | Status: SHIPPED | OUTPATIENT
Start: 2023-11-28

## 2023-11-28 RX ADMIN — CHLORPROMAZINE HYDROCHLORIDE 75 MG: 25 TABLET, FILM COATED ORAL at 16:07

## 2023-11-28 RX ADMIN — CHLORPROMAZINE HYDROCHLORIDE 75 MG: 25 TABLET, FILM COATED ORAL at 08:56

## 2023-11-28 RX ADMIN — OLANZAPINE 10 MG: 10 TABLET, ORALLY DISINTEGRATING ORAL at 08:56

## 2023-11-28 RX ADMIN — CYANOCOBALAMIN TAB 500 MCG 1000 MCG: 500 TAB at 08:56

## 2023-11-28 NOTE — DISCHARGE SUMMARY
Discharge Summary - [address] 25 y.o. male MRN: [id number]  Unit/Bed#: [facility] 955-65 Encounter: [id number]     Admission Date: 11/3/2023         Discharge Date: No discharge date for patient encounter. Attending Psychiatrist: Sherlyn Moran*    Reason for Admission/HPI: Major depressive disorder, single episode, unspecified [F32.9]  Homicidal ideation [R45.850]    Patient is a 25 y.o. male who presented with homicidal ideation, aggressive behavior    Hospital Course: On 11/1/23, the patient presented to [id number] [facility] ED due to unstable mood, increased agitation, aggressive behavior, and homicidal threats towards family members. Patient reportedly made homicidal threats of wanting to slash his mother's throat, was agitated and threatening staff in the ED, and required physical restraints. Pt endorsed decreased need for sleep, grandiosity, anger outbursts, and increased irritability. After evaluation in the ED, the patient was admitted on a voluntary 201 commitment to the [address] [place] [address] inpatient psychiatric unit. Patient was started on every 7 minutes precautions. During the hospitalization, the patient was attending individual, group, milieu, and occupational therapy. To address mood swings, irritability, and aggressive behavior the patient was started on Abilify and Depakote, but Abilify was ineffective and patient refused mood stabilizing medications. The antipsychotic medication Zyprexa was then begun and titrated to 10 mg in the morning and 20 mg at night. This was somewhat effective in reducing homicidal ideation, but patient remained aggressive, irritable, and threatening towards staff, so Thorazine was added and titrated to 75 mg TID. To address insomnia the patient was started on  trazadone 150 mg PO at night .  Prior to beginning of treatment, medications risks and benefits and possible side effects including risk of parkinsonian symptoms, Tardive Dyskinesia and metabolic syndrome related to treatment with antipsychotic medications were reviewed with the patient. The patient verbalized understanding and agreement for treatment. Patient's symptoms gradually improved over the hospital course. At the end of treatment, the patient was doing well and mood was stable. The patient denied suicidal ideation, intent or plan and denied homicidal ideation, intent or plan at the time of discharge. There was no overt psychosis at the time of discharge. Sleep and appetite improved. The patient was tolerating medications and not reporting any significant side effects at the time of discharge. Since Liberty Garcia was doing well at the end of the hospitalization, treatment team felt that he could be safely discharged to outpatient care. He contracted for safety and agreed to call 911 or attend his nearest emergency department if his clinical condition deteriorated. The outpatient follow up with intake at [facility] on 12/5 at 10am  was arranged by the unit  upon discharge.     The patient was discharged on:  - Zyprexa 10 mg PO in the morning and Zyprexa 20 mg PO in the evening  - Thorazine 200 mg PO at night  - Trazodone 150 mg PO at night    Mental Status at time of Discharge:     Appearance:  age appropriate and casually dressed   Behavior:  normal   Speech:  normal pitch and normal volume   Mood:  euthymic   Affect:  normal   Thought Process:  normal   Thought Content:  normal   Perceptual Disturbances: None   Risk Potential: Suicidal Ideations none, Homicidal Ideations none, and Potential for Aggression No   Sensorium:  person, place, time/date, and situation   Cognition:  recent and remote memory grossly intact   Consciousness:  alert and awake    Attention: attention span and concentration were age appropriate   Insight:  fair   Judgment: fair   Gait/Station: normal gait/station   Motor Activity: no abnormal movements     Admission Diagnosis:Major depressive disorder, single episode, unspecified [F32.9]  Homicidal ideation [R45.850]    Discharge Diagnosis:   Principal Problem:    Mood disorder ([address]) r/o Bipolar D/o  Active Problems:    Autism spectrum disorder    Continuous cannabis use    History of oppositional defiant disorder  Resolved Problems:    * No resolved hospital problems.  *        Lab results:  Admission on 11/03/2023   Component Date Value    Vitamin B-12 11/07/2023 160 (L)     Vit D, 25-Hydroxy 11/07/2023 13.1 (L)     Folate 11/07/2023 5.1 (L)     Ventricular Rate 11/04/2023 59     Atrial Rate 11/04/2023 59     MT Interval 11/04/2023 148     QRSD Interval 11/04/2023 94     QT Interval 11/04/2023 432     QTC Interval 11/04/2023 427     P Axis 11/04/2023 117     QRS Axis 11/04/2023 102     T Wave [place] 11/04/2023 127     WBC 11/07/2023 6.43     RBC 11/07/2023 5.56     Hemoglobin 11/07/2023 16.0     Hematocrit 11/07/2023 50.3 (H)     MCV 11/07/2023 91     MCH 11/07/2023 28.8     MCHC 11/07/2023 31.8     RDW 11/07/2023 14.4     Platelets 52/60/1084 155     nRBC 11/07/2023 0     Neutrophils Relative 11/07/2023 55     Immat GRANS % 11/07/2023 0     Lymphocytes Relative 11/07/2023 35     Monocytes Relative 11/07/2023 8     Eosinophils Relative 11/07/2023 2     Basophils Relative 11/07/2023 0     Neutrophils Absolute 11/07/2023 3.50     Immature Grans Absolute 11/07/2023 0.01     Lymphocytes Absolute 11/07/2023 2.27     Monocytes Absolute 11/07/2023 0.50     Eosinophils Absolute 11/07/2023 0.13     Basophils Absolute 11/07/2023 0.02     Sodium 11/07/2023 138     Potassium 11/07/2023 3.5     Chloride 11/07/2023 101     CO2 11/07/2023 31     ANION GAP 11/07/2023 6     BUN 11/07/2023 9     Creatinine 11/07/2023 0.87     Glucose 11/07/2023 81     Glucose, Fasting 11/07/2023 81     Calcium 11/07/2023 9.6     AST 11/07/2023 27     ALT 11/07/2023 34     Alkaline Phosphatase 11/07/2023 99     Total Protein 11/07/2023 7.4     Albumin 11/07/2023 4.3     Total Bilirubin 11/07/2023 0.56     eGFR 11/07/2023 122     Hemoglobin A1C 11/07/2023 5.7 (H)     EAG 11/07/2023 117     Cholesterol 11/07/2023 125     Triglycerides 11/07/2023 82     HDL, Direct 11/07/2023 43     LDL Calculated 11/07/2023 66     Non-HDL-Chol (CHOL-HDL) 11/07/2023 82     TSH 3RD GENERATON 11/07/2023 1. 331     Ventricular Rate 11/05/2023 69     Atrial Rate 11/05/2023 69     OH Interval 11/05/2023 160     QRSD Interval 11/05/2023 94     QT Interval 11/05/2023 402     QTC Interval 11/05/2023 430     P Axis 11/05/2023 53     QRS [place] 11/05/2023 65     T Wave [place] 11/05/2023 36     Ventricular Rate 11/16/2023 81     Atrial Rate 11/16/2023 81     OH Interval 11/16/2023 150     QRSD Interval 11/16/2023 92     QT Interval 11/16/2023 378     QTC Interval 11/16/2023 439     P Axis 11/16/2023 42     QRS [place] 11/16/2023 61     T Wave [place] 11/16/2023 22        Discharge Medications:  Current Discharge Medication List        START taking these medications    Details   chlorproMAZINE (THORAZINE) 200 mg tablet Take 1 tablet (200 mg total) by mouth daily at bedtime Do not start before December 26, 2023. Qty: 30 tablet, Refills: 0    Associated Diagnoses: Mood disorder ([address])      traZODone (DESYREL) 150 mg tablet Take 1 tablet (150 mg total) by mouth daily at bedtime Do not start before December 26, 2023.   Qty: 30 tablet, Refills: 0    Associated Diagnoses: Mood disorder ([address])             STOP taking these medications       amphetamine-dextroamphetamine (ADDERALL XR) 20 MG 24 hr capsule Comments:   Reason for Stopping:         Benzoyl Peroxide 10 % LIQD Comments:   Reason for Stopping:         lithium 600 MG capsule Comments:   Reason for Stopping:         mirtazapine (REMERON) 15 mg tablet Comments:   Reason for Stopping:                CONTINUE these medications which have CHANGED    Details   !! OLANZapine (ZyPREXA) 10 mg tablet Take 1 tablet (10 mg total) by mouth every morning Do not start before December 26, 2023Vikki Bottom: 30 tablet, Refills: 0    Associated Diagnoses: Mood disorder ([address])      ! ! OLANZapine (ZyPREXA) 20 MG tablet Take 1 tablet (20 mg total) by mouth daily at bedtime Do not start before December 26, 2023. Qty: 30 tablet, Refills: 0    Associated Diagnoses: Mood disorder ([address])       ! ! - Potential duplicate medications found. Please discuss with provider. Discharge instructions/Information to patient and family:   See after visit summary for information provided to patient and family. Provisions for Follow-Up Care:  See after visit summary for information related to follow-up care and any pertinent home health orders. Discharge Statement   I spent 30 minutes discharging the patient. This time was spent on the day of discharge. I had direct contact with the patient on the day of discharge. Additional documentation is required if more than 30 minutes were spent on discharge.

## 2023-11-28 NOTE — NURSING NOTE
Patient seen in the milieu at the start of the evening. He did report some irritability after he was given a new roommate that was "too close" to him. Patient did walk away from the peer and did not further escalate, reports that he was in a deep sleep and woken up unexpectedly. Patient denies any SI/HI or hallucinations. 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 Reports looking forward to returning home and seeing his family, as well as working on his long term goals. Requested PRN trazodone with HS medications. Q7 minute monitoring ongoing.

## 2023-11-28 NOTE — PROGRESS NOTES
Progress Note - Shante Serra 25 y.o. male MRN: [id number]    Unit/Bed#: Alfredo Gorman 540-13 Encounter: [id number]        Subjective:   Patient seen and examined at bedside after reviewing the chart and discussing the case with the caring staff. Patient has no acute symptoms. Patient is being discharged today. Patient is requesting all his prescriptions. I reviewed and reconciled patient's problem list and medications. Physical Exam   Vitals: Blood pressure 156/76, pulse 91, temperature 97.5 °F (36.4 °C), temperature source Temporal, resp. rate 18, height 5' 8" (1.727 m), weight 83.2 kg (183 lb 6.4 oz), SpO2 98 %. ,Body mass index is 27.89 kg/m². Constitutional: Patient in no acute distress. HEENT: PERR, EOMI, MMM, neck supple. Cardiovascular: Normal rate, regular rhythm. Pulmonary/Chest: Lungs clear bilaterally. Abdomen: Non distended. Assessment/Plan:  Shante Serra is a(n) 25y.o. year old male with mood disorder. Medical clearance. Patient is medically cleared for discharge. All scripts will be sent out for the patient. 1.  Arthralgia/headache. Patient may get Tylenol as needed. 2.  Insomnia. Patient may take trazodone as needed. 3.  Prediabetes. Hgb A1c 5.7% on 11/7/23. Lifestyle modifications. 4.  Vitamin B12 deficiency. Patient started on vitamin B12 supplement. 5.  Vitamin D deficiency. Patient started on vitamin D2 37775 units weekly x 10 weeks followed by vitamin D3 1000 units daily. 6.  Tobacco abuse. Patient may request nicotine gum as needed. 7.  Sore throat. Patient may use Chloraseptic spray as needed. 8.  Hemorrhoids. Apply Anusol cream as needed. 9.  Eczema. Apply hydrocortisone cream as needed.

## 2023-11-28 NOTE — PLAN OF CARE
Problem: Ineffective Coping  Goal: Identifies ineffective coping skills  Outcome: Adequate for Discharge  Goal: Identifies healthy coping skills  Outcome: Adequate for Discharge  Goal: Demonstrates healthy coping skills  Outcome: Adequate for Discharge  Goal: Participates in unit activities  Description: Interventions:  - Provide therapeutic environment   - Provide required programming   - Redirect inappropriate behaviors   Outcome: Adequate for Discharge  Goal: Patient/Family participate in treatment and DC plans  Description: Interventions:  - Provide therapeutic environment  Outcome: Adequate for Discharge  Goal: Patient/Family verbalizes awareness of resources  Outcome: Adequate for Discharge  Goal: Understands least restrictive measures  Description: Interventions:  - Utilize least restrictive behavior  Outcome: Adequate for Discharge  Goal: Free from restraint events  Description: - Utilize least restrictive measures   - Provide behavioral interventions   - Redirect inappropriate behaviors   Outcome: Adequate for Discharge     Problem: Anxiety  Goal: Anxiety is at manageable level  Description: Interventions:  - Assess and monitor patient's anxiety level. - Monitor for signs and symptoms (heart palpitations, chest pain, shortness of breath, headaches, nausea, feeling jumpy, restlessness, irritable, apprehensive). - Collaborate with interdisciplinary team and initiate plan and interventions as ordered.   - [place] patient to unit/surroundings  - Explain treatment plan  - Encourage participation in care  - Encourage verbalization of concerns/fears  - Identify coping mechanisms  - Assist in developing anxiety-reducing skills  - Administer/offer alternative therapies  - Limit or eliminate stimulants  Outcome: Adequate for Discharge     Problem: Risk for Violence/Aggression Toward Others  Goal: Treatment Goal: Refrain from acts of violence/aggression during length of stay, and demonstrate improved impulse control at the time of discharge  Outcome: Adequate for Discharge  Goal: Verbalize thoughts and feelings  Description: Interventions:  - Assess and re-assess patient's level of risk, every waking shift  - Engage patient in 1:1 interactions, daily, for a minimum of 15 minutes   - Allow patient to express feelings and frustrations in a safe and non-threatening manner   - Establish rapport/trust with patient   Outcome: Adequate for Discharge  Goal: Refrain from harming others  Outcome: Adequate for Discharge  Goal: Refrain from destructive acts on the environment or property  Outcome: Adequate for Discharge  Goal: Control angry outbursts  Description: Interventions:  - Monitor patient closely, per order  - Ensure early verbal de-escalation  - Monitor prn medication needs  - Set reasonable/therapeutic limits, outline behavioral expectations, and consequences   - Provide a non-threatening milieu, utilizing the least restrictive interventions   Outcome: Adequate for Discharge  Goal: Attend and participate in unit activities, including therapeutic, recreational, and educational groups  Description: Interventions:  - Provide therapeutic and educational activities daily, encourage attendance and participation, and document same in the medical record   Outcome: Adequate for Discharge  Goal: Identify appropriate positive anger management techniques  Description: Interventions:  - Offer anger management and coping skills groups   - Staff will provide positive feedback for appropriate anger control  Outcome: Adequate for Discharge     Problem: DISCHARGE PLANNING - CARE MANAGEMENT  Goal: Discharge to post-acute care or home with appropriate resources  Description: INTERVENTIONS:  - Conduct assessment to determine patient/family and health care team treatment goals, and need for post-acute services based on payer coverage, community resources, and patient preferences, and barriers to discharge  - Address psychosocial, clinical, and financial barriers to discharge as identified in assessment in conjunction with the patient/family and health care team  - Arrange appropriate level of post-acute services according to patient’s   needs and preference and payer coverage in collaboration with the physician and health care team  - Communicate with and update the patient/family, physician, and health care team regarding progress on the discharge plan  - Arrange appropriate transportation to post-acute venues  Outcome: Adequate for Discharge     Problem: Ineffective Coping  Goal: Participates in unit activities  Description: Interventions:  - Provide therapeutic environment   - Provide required programming   - Redirect inappropriate behaviors   Outcome: Adequate for Discharge     Problem: Nutrition/Hydration-ADULT  Goal: Nutrient/Hydration intake appropriate for improving, restoring or maintaining nutritional needs  Description: Monitor and assess patient's nutrition/hydration status for malnutrition. Collaborate with interdisciplinary team and initiate plan and interventions as ordered. Monitor patient's weight and dietary intake as ordered or per policy. Utilize nutrition screening tool and intervene as necessary. Determine patient's food preferences and provide high-protein, high-caloric foods as appropriate.      INTERVENTIONS:  - Monitor oral intake, urinary output, labs, and treatment plans  - Assess nutrition and hydration status and recommend course of action  - Evaluate amount of meals eaten  - Assist patient with eating if necessary   - Allow adequate time for meals  - Recommend/ encourage appropriate diets, oral nutritional supplements, and vitamin/mineral supplements  - Order, calculate, and assess calorie counts as needed  - Recommend, monitor, and adjust tube feedings and TPN/PPN based on assessed needs  - Assess need for intravenous fluids  - Provide specific nutrition/hydration education as appropriate  - Include patient/family/caregiver in decisions related to nutrition  Outcome: Adequate for Discharge

## 2023-11-28 NOTE — SOCIAL WORK
Akilah Alonzo (Mother)  contacted regarding dc time being pushed to 5pm  in order to accommodate [facility] meds. Call ended mutually. Provider to update pt with dc time. Roundtrip requested today 5pm. The [facility] [place] you requested for Sharon [facility]. in unit/room [facility] on 11/28/2023 is scheduled to arrive at 5:00pm EST! [facility] WITH [facility] is handling this ride and you can contact them at [phone]. AVS faxed to ARIEL Giron at [facility]. Pt notified of change in dc time, agreeable and understanding of the importance of leaving unit with meds.

## 2023-11-28 NOTE — BH TRANSITION RECORD
Contact Information: If you have any questions, concerns, pended studies, tests and/or procedures, or emergencies regarding your inpatient behavioral health visit. Please contact [address] behavioral health [facility] [phone] and ask to speak to a , nurse or physician. A contact is available 24 hours/ 7 days a week at this number. Summary of Procedures Performed During your Stay:  Below is a list of major procedures performed during your hospital stay and a summary of results:  - No major procedures performed. If studies are pending at discharge, follow up with your PCP and/or referring provider.

## 2023-11-28 NOTE — NURSING NOTE
Patient discharge from unit with all belongings, including medications provided by facility as well as scripts for next month.  Discharge instructions medications and follow up appointments reviewed Patient walked to [facility] by staff and met by [facility] to be taken to private home

## 2023-11-28 NOTE — NURSING NOTE
Indigent meds provided by hospital and filled through [facility]. Confirmed upon receipt to match discharge medication list. 30 day supply of thorazine, trazodone, and zyprexa to be provided to patient at time of discharge.

## 2023-11-28 NOTE — PROGRESS NOTES
11/28/23   Team Meeting   Meeting Type Daily Rounds   Team Members Present   Team Members Present Physician;Nurse;   Physician Team Member Dr Jasbir Doan MD; Marium Ring; Dr. Ajay Ruiz MD   Nursing Team Member Dottie Hinojosa, [place]   Social Work Team Member [place]   Patient/Family Present                        Amrita Ogden (Rec Therapist)   Patient Present No   Patient's Family Present No

## 2024-01-17 ENCOUNTER — HOSPITAL ENCOUNTER (EMERGENCY)
Facility: HOSPITAL | Age: 24
End: 2024-01-20
Attending: EMERGENCY MEDICINE
Payer: COMMERCIAL

## 2024-01-17 DIAGNOSIS — F39 MOOD DISORDER (HCC): ICD-10-CM

## 2024-01-17 DIAGNOSIS — R45.1 AGITATION: Primary | ICD-10-CM

## 2024-01-17 PROCEDURE — 82075 ASSAY OF BREATH ETHANOL: CPT | Performed by: EMERGENCY MEDICINE

## 2024-01-17 PROCEDURE — 80307 DRUG TEST PRSMV CHEM ANLYZR: CPT | Performed by: EMERGENCY MEDICINE

## 2024-01-17 PROCEDURE — 99285 EMERGENCY DEPT VISIT HI MDM: CPT

## 2024-01-17 NOTE — ED PROVIDER NOTES
History  Chief Complaint   Patient presents with    Psychiatric Evaluation     Pt presents to ER with complaints of feeling aggressive and off medication. Denies SI/HI         Psychiatric Evaluation      Prior to Admission Medications   Prescriptions Last Dose Informant Patient Reported? Taking?   OLANZapine (ZyPREXA) 10 mg tablet   No No   Sig: Take 1 tablet (10 mg total) by mouth every morning   OLANZapine (ZyPREXA) 20 MG tablet   No No   Sig: Take 1 tablet (20 mg total) by mouth daily at bedtime   chlorproMAZINE (THORAZINE) 200 mg tablet   No No   Sig: Take 1 tablet (200 mg total) by mouth daily at bedtime   traZODone (DESYREL) 150 mg tablet   No No   Sig: Take 1 tablet (150 mg total) by mouth daily at bedtime      Facility-Administered Medications: None       Past Medical History:   Diagnosis Date    ADHD     Anxiety     Asperger's disorder     Depression     Oppositional defiant disorder     Schizoaffective disorder (HCC)        Past Surgical History:   Procedure Laterality Date    APPENDECTOMY         Family History   Problem Relation Age of Onset    Bipolar disorder Mother      I have reviewed and agree with the history as documented.    E-Cigarette/Vaping    E-Cigarette Use Never User      E-Cigarette/Vaping Substances     Social History     Tobacco Use    Smoking status: Never    Smokeless tobacco: Never   Vaping Use    Vaping status: Never Used   Substance Use Topics    Alcohol use: Never     Comment: pt reports being a social drinker    Drug use: Yes     Types: Marijuana     Comment: Pt reports daily use       Review of Systems    Physical Exam  Physical Exam  Vitals and nursing note reviewed.   Constitutional:       General: He is not in acute distress.     Appearance: He is well-developed.   HENT:      Head: Normocephalic and atraumatic.      Mouth/Throat:      Mouth: Mucous membranes are moist.   Eyes:      Conjunctiva/sclera: Conjunctivae normal.      Pupils: Pupils are equal, round, and reactive to  light.   Neck:      Trachea: No tracheal deviation.   Cardiovascular:      Rate and Rhythm: Normal rate and regular rhythm.      Heart sounds: Normal heart sounds.   Pulmonary:      Effort: Pulmonary effort is normal. No respiratory distress.      Breath sounds: Normal breath sounds.   Musculoskeletal:      Cervical back: Normal range of motion.   Skin:     General: Skin is warm and dry.   Neurological:      Mental Status: He is alert and oriented to person, place, and time.      GCS: GCS eye subscore is 4. GCS verbal subscore is 5. GCS motor subscore is 6.   Psychiatric:         Mood and Affect: Mood and affect normal.         Speech: Speech is not rapid and pressured or tangential.         Behavior: Behavior normal.         Thought Content: Thought content does not include homicidal or suicidal ideation.      Comments: Somewhat verbose at times. Not responding to external stimuli         Vital Signs  ED Triage Vitals [01/17/24 1658]   Temperature Pulse Respirations Blood Pressure SpO2   98.7 °F (37.1 °C) 98 18 135/89 98 %      Temp Source Heart Rate Source Patient Position - Orthostatic VS BP Location FiO2 (%)   Oral Monitor Sitting Right arm --      Pain Score       --           Vitals:    01/17/24 1658 01/17/24 2121 01/17/24 2246 01/18/24 0146   BP: 135/89 130/76 125/67 115/56   Pulse: 98 87 80 80   Patient Position - Orthostatic VS: Sitting            Visual Acuity      ED Medications  Medications - No data to display    Diagnostic Studies  Results Reviewed       Procedure Component Value Units Date/Time    Rapid drug screen, urine [[id number]]  (Abnormal) Collected: 01/17/24 1952    Lab Status: Final result Specimen: Urine, Clean Catch Updated: 01/17/24 2010     Amph/Meth UR Negative     Barbiturate Ur Negative     Benzodiazepine Urine Negative     Cocaine Urine Negative     Methadone Urine Negative     Opiate Urine Negative     PCP Ur Negative     THC Urine Positive     Oxycodone Urine Negative    Narrative:       Presumptive report. If requested, specimen will be sent to reference lab for confirmation.  FOR MEDICAL PURPOSES ONLY.   IF CONFIRMATION NEEDED PLEASE CONTACT THE LAB WITHIN 5 DAYS.    Drug Screen Cutoff Levels:  AMPHETAMINE/METHAMPHETAMINES  1000 ng/mL  BARBITURATES     200 ng/mL  BENZODIAZEPINES     200 ng/mL  COCAINE      300 ng/mL  METHADONE      300 ng/mL  OPIATES      300 ng/mL  PHENCYCLIDINE     25 ng/mL  THC       50 ng/mL  OXYCODONE      100 ng/mL    POCT alcohol breath test [[id number]]  (Normal) Resulted: 01/17/24 1807    Lab Status: Final result Updated: 01/17/24 1807     EXTBreath Alcohol 0.00                   No orders to display              Procedures  Procedures         ED Course                               SBIRT 22yo+      Flowsheet Row Most Recent Value   Initial Alcohol Screen: US AUDIT-C     1. How often do you have a drink containing alcohol? 0 Filed at: 01/17/2024 1659   2. How many drinks containing alcohol do you have on a typical day you are drinking?  0 Filed at: 01/17/2024 1659   3a. Male UNDER 65: How often do you have five or more drinks on one occasion? 0 Filed at: 01/17/2024 1659   3b. FEMALE Any Age, or MALE 65+: How often do you have 4 or more drinks on one occassion? 0 Filed at: 01/17/2024 1659   Audit-C Score 0 Filed at: 01/17/2024 1659   EMERSON: How many times in the past year have you...    Used an illegal drug or used a prescription medication for non-medical reasons? Never Filed at: 01/17/2024 1659                      Medical Decision Making  This is a 23-year-old male who presents here today for agitation making threatening statements at home.  He says his family is not very supportive of him and is not a fan of his current living situation.  He says he stopped taking his medications about a week ago because he was gaining weight, and this was making him more depressed.  He last saw his psychiatrist about a week and a half ago and did mention side effects to medications  "but not that he was thinking of stopping it.  He says he is also on medical marijuana and has not taken this in about 6 days because his mother will not give it to him.  He says there was an argument at home and he made threats against his family \"to scare them\" and to get them to back off with him.  He says he would not actually do anything to harm them.  He denies any homicidal ideation.  He denies any auditory or visual hallucinations.  He denies alcohol or drug use.  He feels like he would [facility] but does not feel like things are bad enough that he needs inpatient admission again.    ROS: Otherwise negative, unless stated as above.    He is well-appearing, in no acute distress.  He is occasionally verbose at times, but not tangential.  Exam is otherwise unremarkable.  He is asking if there are beds available at [facility].  No 302 was petitioned prior to arrival, and he currently does not meet criteria for 302 being petitioned.  He is a candidate for [facility], however if there are no beds available would likely benefit from voluntary commitment to help adjust medications.    He was accepted at [facility], however due to inability to get his medication tonight since they cannot get in touch with his mother to bring them to the ER, and feeling like it is an unsafe situation for him to return home as his mother is a trigger, he request that he stay here tonight and be discharged to their facility tomorrow.  He is not currently taking any medications at home, so we will not continue any for him at this time.  He expresses understanding with this plan.    Problems Addressed:  Agitation: acute illness or injury  Mood disorder (HCC): acute illness or injury    Amount and/or Complexity of Data Reviewed  External Data Reviewed: notes.  Labs: ordered. Decision-making details documented in ED Course.             Disposition  Final diagnoses:   Agitation   Mood disorder " (HCC)     Time reflects when diagnosis was documented in both MDM as applicable and the Disposition within this note       Time User Action Codes Description Comment    1/17/2024  8:49 PM Rosi Bass [R45.1] Agitation     1/17/2024  8:49 PM Rosi Bass [F39] Mood disorder (HCC)           ED Disposition       ED Disposition   Discharge    Condition   Stable    Date/Time   Wed Jan 17, 2024 2049    Comment   Rodríguez Farias discharge to [facility]             MD Documentation      Flowsheet Row Most Recent Value   Sending MD Dr. Bass          Follow-up Information    None         Patient's Medications   Discharge Prescriptions    No medications on file       No discharge procedures on file.    PDMP Review         Value Time User    PDMP Reviewed  Yes 11/28/2023 11:00 AM Franny Kate MD            ED Provider  Electronically Signed by             Rosi Bass MD  01/18/24 0236

## 2024-01-17 NOTE — ED NOTES
The patient was brought to the ED under a 302 petitioned by his mother. In the petition, the patient's mother states that the patient has been increasingly irritable, yelling and cursing. He has not been compliant with his medications. His mother reports that the patient has paranoid delusions - no paranoid delusions were evident at the time of the assessment. She feels as though he blames her for all of his problems. The patient does contribute many of his life stressors to his mother, saying that she never raised him independently. He feels as though he is unable to live on his own or obtain employment without his mother's help. The patient reported that he has concerns about his mother's mental health as well. He lives with his mother, which he feels is not a supportive environment. His father left him when the patient was 14. He found that his father had  a year prior at the age of 16. The patient misses his father, and stated that his father was supportive and loving despite him not seeing him to often. The patient has no history of suicide attempts but has been hospitalized in the past. Upon being transported to the ED, the patient inquired as to whether or not he could be accepted to [facility] crisis residential facility. Per Ant with [facility], there are beds available. A referral will be made. The patient denied suicidal and homcidal ideation. No psychosis is present.

## 2024-01-18 RX ORDER — CHLORPROMAZINE HYDROCHLORIDE 200 MG/1
200 TABLET, FILM COATED ORAL
Qty: 30 TABLET | Refills: 0 | Status: SHIPPED | OUTPATIENT
Start: 2024-01-18

## 2024-01-18 RX ORDER — OLANZAPINE 10 MG/1
20 TABLET ORAL
Status: DISCONTINUED | OUTPATIENT
Start: 2024-01-18 | End: 2024-01-20 | Stop reason: HOSPADM

## 2024-01-18 RX ORDER — CHLORPROMAZINE HYDROCHLORIDE 100 MG/1
200 TABLET, FILM COATED ORAL
Status: DISCONTINUED | OUTPATIENT
Start: 2024-01-18 | End: 2024-01-20 | Stop reason: HOSPADM

## 2024-01-18 RX ORDER — OLANZAPINE 10 MG/1
10 TABLET ORAL EVERY MORNING
Qty: 30 TABLET | Refills: 0 | Status: SHIPPED | OUTPATIENT
Start: 2024-01-18

## 2024-01-18 RX ORDER — TRAZODONE HYDROCHLORIDE 150 MG/1
150 TABLET ORAL
Qty: 30 TABLET | Refills: 0 | Status: SHIPPED | OUTPATIENT
Start: 2024-01-18

## 2024-01-18 RX ORDER — OLANZAPINE 2.5 MG/1
10 TABLET, FILM COATED ORAL EVERY MORNING
Status: DISCONTINUED | OUTPATIENT
Start: 2024-01-19 | End: 2024-01-20 | Stop reason: HOSPADM

## 2024-01-18 RX ORDER — OLANZAPINE 20 MG/1
20 TABLET ORAL
Qty: 30 TABLET | Refills: 0 | Status: SHIPPED | OUTPATIENT
Start: 2024-01-18

## 2024-01-18 RX ADMIN — CHLORPROMAZINE HYDROCHLORIDE 200 MG: 100 TABLET, FILM COATED ORAL at 22:35

## 2024-01-18 RX ADMIN — OLANZAPINE 20 MG: 10 TABLET, FILM COATED ORAL at 22:35

## 2024-01-18 RX ADMIN — TRAZODONE HYDROCHLORIDE 150 MG: 100 TABLET ORAL at 22:35

## 2024-01-18 NOTE — ED NOTES
HAYDEE received a call from Avis from [facility] who stated that carlos to him not having his medication he is not able to come into the residence at this time. She stated that if his mother is able to bring them that they can accept him.     HAYDEE attempted to call Eleuterio simpson mother at  with no response unable to leave a voice mail.       Updated the Dr.     HAYDEE, CD

## 2024-01-18 NOTE — ED NOTES
CIS contacted [facility] about patient's medication. Pharmacy stated that they would not be able to fill prescriptions until 1/20/2024.

## 2024-01-18 NOTE — ED NOTES
CW made a call to [facility] to confirm a  time. Lyubov from [facility] stated that he needs to be reviewed by the resident nurse prior to admit. They would call back when the nurse arrives.     CW, CD

## 2024-01-18 NOTE — CASE MANAGEMENT
Case Management Progress Note    Patient name Rodríguez Farias  Location ED 08/ED 08 MRN [id number]  : 2000 Date 2024       LOS (days): 0  Geometric Mean LOS (GMLOS) (days):   Days to GMLOS:        OBJECTIVE:        Current admission status: Emergency  Preferred Pharmacy:   [facility] [place] [facility] [place] - [address]  [address]  [place] [address]  Phone: [phone] Fax: [phone]    [facility] [place] - [address]  [address] [address]  Phone: [phone] Fax: [phone]    Primary Care Provider: Bethel Mccurdy MD    Primary Insurance: [employer]  Secondary Insurance:     PROGRESS NOTE:  CM received TT from Nursing about assistance with obtaining patient's meds from home.  These are required for admission to [facility].    CM contacted patient's mother -[phone], who states she is unaware of where the patient keeps his meds and has no transportation to hospital.  If the patient tells her the location of his meds and either a pickup or transportation is provided ,she will assist with the request.    CM conferred with Department Clinical Coordinator- Eula Alvarez regarding hospital providing a weeks worth of meds and have delivered them delivered to the patient's room or [facility].  It was determined that the patient and Mother will need to work out a plan to coordinate this request; either his home meds or contact [facility] for delivery and billed to patient.  [facility] would need that request by noon for a same day delivery.    CM updated Victoria Pena - Behavioral Health and Shanta Reeves - ED Nursing ,via TT
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
## 2024-01-18 NOTE — ED NOTES
Clinical was faxed to [facility].  Additional clinical was shared with [facility].  Per Jeanette, [facility] may be willing to accept the patient.  She will review clinical with her supervisor and reach out to this [facility].

## 2024-01-19 PROCEDURE — 99285 EMERGENCY DEPT VISIT HI MDM: CPT | Performed by: EMERGENCY MEDICINE

## 2024-01-19 RX ORDER — LORAZEPAM 1 MG/1
2 TABLET ORAL ONCE
Status: COMPLETED | OUTPATIENT
Start: 2024-01-19 | End: 2024-01-19

## 2024-01-19 RX ADMIN — LORAZEPAM 2 MG: 1 TABLET ORAL at 05:13

## 2024-01-19 RX ADMIN — OLANZAPINE 20 MG: 10 TABLET, FILM COATED ORAL at 22:56

## 2024-01-19 RX ADMIN — TRAZODONE HYDROCHLORIDE 150 MG: 100 TABLET ORAL at 22:57

## 2024-01-19 NOTE — ED NOTES
Crisis reached out to [facility] [phone] to follow up on the 302 that was petitioned on 1/17/2024. Awaiting a call back.  Per Jeanette HUBBARD, on Wednesday only 1 page of 302 commitment was provided for information purposes.

## 2024-01-19 NOTE — ED NOTES
Crisis reached out to pt's mother/Eleuterio to inquire about pt's medications. Mother stated she spoke with someone last night about this, and informed bárbara she has no knowledge of were pt's medication could be.  Mother also stated that she feared about her safety and potentially pt coming back home without treatment. Mother stated pt blames her for everything, making verbal as well as via text threats towards her. Mother states when pt takes his medications he performs better but then he stops them stating her feels better. Mother states that she does not feel comfortable/safe for the pt coming back home without mental health treatment.

## 2024-01-19 NOTE — ED RE-EVALUATION NOTE
Apparently [facility] will not accept the patient without his medications.  Apparently the mother does not have any medications.  Currently the pharmacy will not refill the patient's medications until tomorrow when the refill is due.  [facility] will not take the patient until tomorrow when the medications are available.  Will contact our case management to discuss why medications were not provided.     Louis Dickson MD  01/19/24 8360     no

## 2024-01-19 NOTE — ED NOTES
Crisis received a call from Art Ingram/Smita. 302 was petitioned by a mother but it was not presented to the delegate as pt wanted to go to [facility] and was accepted. Smita stated that she was in assumption that pt signed the 201.      Per Smita, when pharmacy contacted tomorrow and medications are refilled pt transport can be set. [facility] is holding a bed for the pt. Please contact [facility] and provide them with the ETA.

## 2024-01-19 NOTE — ED RE-EVALUATION NOTE
Sleeping  Stable vitals  Note from case management says patient will need to get his medications from the pharmacy or from his mother.  Discussed with nursing.     Louis Dickson MD  01/19/24 6285

## 2024-01-20 VITALS
HEART RATE: 98 BPM | SYSTOLIC BLOOD PRESSURE: 138 MMHG | RESPIRATION RATE: 18 BRPM | DIASTOLIC BLOOD PRESSURE: 95 MMHG | OXYGEN SATURATION: 97 % | TEMPERATURE: 98.2 F

## 2024-01-20 RX ORDER — ACETAMINOPHEN 325 MG/1
650 TABLET ORAL EVERY 4 HOURS PRN
Status: DISCONTINUED | OUTPATIENT
Start: 2024-01-20 | End: 2024-01-20 | Stop reason: HOSPADM

## 2024-01-20 RX ORDER — IBUPROFEN 400 MG/1
400 TABLET ORAL EVERY 6 HOURS PRN
Status: DISCONTINUED | OUTPATIENT
Start: 2024-01-20 | End: 2024-01-20 | Stop reason: HOSPADM

## 2024-01-20 RX ADMIN — IBUPROFEN 400 MG: 400 TABLET, FILM COATED ORAL at 14:46

## 2024-01-20 RX ADMIN — OLANZAPINE 10 MG: 2.5 TABLET, FILM COATED ORAL at 09:39

## 2024-01-20 NOTE — ED NOTES
CW made contact with [facility] who stated that they have a refill ready for the pt but it is only Zyprex 10MG and 20MG. Pharmacy stated that they are not able to fill the rest of the medications which are Trazodone 150MG and Thorazine 200MG until the 22nd of January.     CW attempted to make contact with pts mother Candace at  call went straight to voice mail    CW, MIRIAM

## 2024-01-20 NOTE — ED NOTES
[facility]risis reached out [facility] and spoke to Grace.  was informed that we have all of the pt's medication and transport will be scheduled.

## 2024-01-20 NOTE — CASE MANAGEMENT
Case Management Discharge Planning Note    Patient name Rodríguez Farias  Location ED 08/ED 08 MRN [id number]  : 2000 Date 2024       Current Admission Date: 2024  Current Admission Diagnosis:Encounter for psychological evaluation   Patient Active Problem List    Diagnosis Date Noted    Continuous cannabis use 2023    History of oppositional defiant disorder 2023    Autism spectrum disorder 2023    Mood disorder (HCC) r/o Bipolar D/o 2023      LOS (days): 0  Geometric Mean LOS (GMLOS) (days):   Days to GMLOS:     OBJECTIVE:            Current admission status: Emergency   Preferred Pharmacy:   [facility] [place] - [address]  [address]  [place] [address]  Phone: [phone] Fax: [phone]    [facility] [address] [place] [address] - [address]  [address] [address]  Phone: [phone] Fax: [phone]    [facility] [place] [place] - [address]  [address]  [facility] [address]  Phone: [phone] Fax: [phone]    Primary Care Provider: Bethel Mccurdy MD    Primary Insurance: [employer]  Secondary Insurance:     DISCHARGE DETAILS:                                          Other Referral/Resources/Interventions Provided:  Referral Comments: ARIEL addressing consult for needed meds prior to d/c to [facility].  CM contacted [facility], but they know nothing about this case and are unable to provide delivery today.  ARIEL contacted Smita from [facility] and she informs CM that she will accept pt to [facility] today (even into the evening), but pt needs meds in hand ifrst.  She states that Janie from [facility] is working on this.  ARIEL contacted the phone number [email or web address] and spoke to Victoria.  She informs ARIEL that she has been unable to reach pt's mother to provide the  medications, because [facility] is unable to refill meds early.  The only ones they can refill is the Zyprexa 10mg and 20mg.  CM tried phone number in the chart and also [phone], but have been unable to reach mother.  CM will continue to try and reach a family member

## 2024-01-20 NOTE — ED NOTES
Pts mother called HAYDEE stating that she attempted to find his medications that were prescribed to him. She was able to find Trazodone and the two Zyprexa. She reported that she is unsure where the Thorazine is she only has an empty bottle.     HAYDEE, CD

## 2024-01-20 NOTE — ED NOTES
Patient requesting food. Bag lunched provided at this time.      Franny Weiner RN  01/19/24 3571
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
## 2024-01-20 NOTE — CASE MANAGEMENT
Case Management Discharge Planning Note    Patient name Rodríguez Farias  Location ED 08/ED 08 MRN [id number]  : 2000 Date 2024       Current Admission Date: 2024  Current Admission Diagnosis:Encounter for psychological evaluation   Patient Active Problem List    Diagnosis Date Noted    Continuous cannabis use 2023    History of oppositional defiant disorder 2023    Autism spectrum disorder 2023    Mood disorder (HCC) r/o Bipolar D/o 2023      LOS (days): 0  Geometric Mean LOS (GMLOS) (days):   Days to GMLOS:     OBJECTIVE:            Current admission status: Emergency   Preferred Pharmacy:   [facility] [place] - [place] - [address]  [address]  [place] [address]  Phone: [phone] Fax: [phone]    [facility] [address] [place] [address] - [address]  [address] [address]  Phone: [phone] Fax: [phone]    [facility] - [place] - [address]  [address]  [facility] [address]  Phone: [phone] Fax: [phone]    Primary Care Provider: Bethel Mccurdy MD    Primary Insurance: [employer]  Secondary Insurance:     DISCHARGE DETAILS:                                          Other Referral/Resources/Interventions Provided:  Referral Comments: CM was able to speak to pt's mother and allowed her to speak to pt in ED 8 and he was able to tell her where his home meds were located.  She will see if she can find them and if she can, she will deliver them to the hospital so pt could be dced to [facility].  CM will continue to follow.  [facility] will not be able to deliver meds until Wednesday.

## 2024-01-20 NOTE — CASE MANAGEMENT
Case Management Discharge Planning Note    Patient name Rodríguez Farias  Location ED 08/ED 08 MRN [id number]  : 2000 Date 2024       Current Admission Date: 2024  Current Admission Diagnosis:Encounter for psychological evaluation   Patient Active Problem List    Diagnosis Date Noted    Continuous cannabis use 2023    History of oppositional defiant disorder 2023    Autism spectrum disorder 2023    Mood disorder (HCC) r/o Bipolar D/o 2023      LOS (days): 0  Geometric Mean LOS (GMLOS) (days):   Days to GMLOS:     OBJECTIVE:            Current admission status: Emergency   Preferred Pharmacy:   [facility] [place] - [place] - [address]  [address]  [place] [address]  Phone: [phone] Fax: [phone]    [facility] [place] - [address]  [address] [address]  Phone: [phone] Fax: [phone]    [facility] - [place] - [address]  [address]  [facility] [address]  Phone: [phone] Fax: [phone]    Primary Care Provider: Bethel Mccurdy MD    Primary Insurance: [address]  Secondary Insurance:     DISCHARGE DETAILS:                                          Other Referral/Resources/Interventions Provided:  Referral Comments: Pt's mother is unable to find all of the required home medications.  Crisis informed.

## 2024-01-20 NOTE — ED NOTES
Crisis received a message from pt mother/Aryatimmy who stated she found pt's medications and will bring them to the Emergency Department.

## 2024-01-21 NOTE — ED NOTES
[employer] claimed the ride for Rodríguez Ovallea in unit/room ED 08 bed ED 08, and will arrive on 01/20/2024 at 8:35pm EST.